# Patient Record
Sex: FEMALE | Race: WHITE | NOT HISPANIC OR LATINO
[De-identification: names, ages, dates, MRNs, and addresses within clinical notes are randomized per-mention and may not be internally consistent; named-entity substitution may affect disease eponyms.]

---

## 2020-04-02 ENCOUNTER — APPOINTMENT (OUTPATIENT)
Dept: GASTROENTEROLOGY | Facility: CLINIC | Age: 60
End: 2020-04-02

## 2020-08-14 ENCOUNTER — EMERGENCY (EMERGENCY)
Facility: HOSPITAL | Age: 60
LOS: 1 days | Discharge: HOME | End: 2020-08-14
Payer: COMMERCIAL

## 2020-08-14 VITALS
DIASTOLIC BLOOD PRESSURE: 80 MMHG | OXYGEN SATURATION: 98 % | TEMPERATURE: 98 F | HEART RATE: 66 BPM | SYSTOLIC BLOOD PRESSURE: 127 MMHG | RESPIRATION RATE: 18 BRPM

## 2020-08-14 DIAGNOSIS — B34.9 VIRAL INFECTION, UNSPECIFIED: ICD-10-CM

## 2020-08-14 DIAGNOSIS — M79.10 MYALGIA, UNSPECIFIED SITE: ICD-10-CM

## 2020-08-14 DIAGNOSIS — Z20.828 CONTACT WITH AND (SUSPECTED) EXPOSURE TO OTHER VIRAL COMMUNICABLE DISEASES: ICD-10-CM

## 2020-08-14 PROCEDURE — 99283 EMERGENCY DEPT VISIT LOW MDM: CPT

## 2020-08-14 PROCEDURE — U0003: CPT

## 2020-08-14 NOTE — ED STATDOCS - PATIENT PORTAL LINK FT
You can access the FollowMyHealth Patient Portal offered by [facility] by registering at the following website: [email or web address]. By joining [employer]’s FollowMyHealth portal, you will also be able to view your health information using other applications (apps) compatible with our system.

## 2020-08-14 NOTE — ED STATDOCS - OBJECTIVE STATEMENT
Pt with no PMH presents to ED with body aches x 2 days. Pt recently exposed to COVID-19. Pt here for testing.

## 2020-08-15 LAB — SARS-COV-2 RNA SPEC QL NAA+PROBE: SIGNIFICANT CHANGE UP

## 2021-04-13 PROBLEM — Z00.00 ENCOUNTER FOR PREVENTIVE HEALTH EXAMINATION: Status: ACTIVE | Noted: 2021-04-13

## 2021-04-22 ENCOUNTER — APPOINTMENT (OUTPATIENT)
Dept: MRI IMAGING | Facility: CLINIC | Age: 61
End: 2021-04-22
Payer: COMMERCIAL

## 2021-04-22 ENCOUNTER — OUTPATIENT (OUTPATIENT)
Dept: OUTPATIENT SERVICES | Facility: HOSPITAL | Age: 61
LOS: 1 days | End: 2021-04-22
Payer: COMMERCIAL

## 2021-04-22 DIAGNOSIS — D48.1 NEOPLASM OF UNCERTAIN BEHAVIOR OF CONNECTIVE AND OTHER SOFT TISSUE: ICD-10-CM

## 2021-04-22 PROCEDURE — 73718 MRI LOWER EXTREMITY W/O DYE: CPT

## 2021-04-22 PROCEDURE — 73718 MRI LOWER EXTREMITY W/O DYE: CPT | Mod: 26,LT

## 2021-06-10 ENCOUNTER — OUTPATIENT (OUTPATIENT)
Dept: OUTPATIENT SERVICES | Facility: HOSPITAL | Age: 61
LOS: 1 days | End: 2021-06-10
Payer: COMMERCIAL

## 2021-06-10 DIAGNOSIS — Z01.818 ENCOUNTER FOR OTHER PREPROCEDURAL EXAMINATION: ICD-10-CM

## 2021-06-10 DIAGNOSIS — D48.1 NEOPLASM OF UNCERTAIN BEHAVIOR OF CONNECTIVE AND OTHER SOFT TISSUE: ICD-10-CM

## 2021-06-10 DIAGNOSIS — Z96.641 PRESENCE OF RIGHT ARTIFICIAL HIP JOINT: Chronic | ICD-10-CM

## 2021-06-10 LAB
A1C WITH ESTIMATED AVERAGE GLUCOSE RESULT: 5.4 % — SIGNIFICANT CHANGE UP (ref 4–5.6)
ANION GAP SERPL CALC-SCNC: 4 MMOL/L — LOW (ref 5–17)
BASOPHILS # BLD AUTO: 0.05 K/UL — SIGNIFICANT CHANGE UP (ref 0–0.2)
BASOPHILS NFR BLD AUTO: 0.9 % — SIGNIFICANT CHANGE UP (ref 0–2)
BUN SERPL-MCNC: 11 MG/DL — SIGNIFICANT CHANGE UP (ref 7–23)
CALCIUM SERPL-MCNC: 8.9 MG/DL — SIGNIFICANT CHANGE UP (ref 8.5–10.1)
CHLORIDE SERPL-SCNC: 109 MMOL/L — HIGH (ref 96–108)
CO2 SERPL-SCNC: 28 MMOL/L — SIGNIFICANT CHANGE UP (ref 22–31)
CREAT SERPL-MCNC: 0.69 MG/DL — SIGNIFICANT CHANGE UP (ref 0.5–1.3)
EOSINOPHIL # BLD AUTO: 0.15 K/UL — SIGNIFICANT CHANGE UP (ref 0–0.5)
EOSINOPHIL NFR BLD AUTO: 2.8 % — SIGNIFICANT CHANGE UP (ref 0–6)
ESTIMATED AVERAGE GLUCOSE: 108 MG/DL — SIGNIFICANT CHANGE UP (ref 68–114)
GLUCOSE SERPL-MCNC: 83 MG/DL — SIGNIFICANT CHANGE UP (ref 70–99)
HCT VFR BLD CALC: 40.4 % — SIGNIFICANT CHANGE UP (ref 34.5–45)
HGB BLD-MCNC: 13.3 G/DL — SIGNIFICANT CHANGE UP (ref 11.5–15.5)
IMM GRANULOCYTES NFR BLD AUTO: 0.2 % — SIGNIFICANT CHANGE UP (ref 0–1.5)
LYMPHOCYTES # BLD AUTO: 1.82 K/UL — SIGNIFICANT CHANGE UP (ref 1–3.3)
LYMPHOCYTES # BLD AUTO: 33.8 % — SIGNIFICANT CHANGE UP (ref 13–44)
MCHC RBC-ENTMCNC: 28 PG — SIGNIFICANT CHANGE UP (ref 27–34)
MCHC RBC-ENTMCNC: 32.9 GM/DL — SIGNIFICANT CHANGE UP (ref 32–36)
MCV RBC AUTO: 85.1 FL — SIGNIFICANT CHANGE UP (ref 80–100)
MONOCYTES # BLD AUTO: 0.44 K/UL — SIGNIFICANT CHANGE UP (ref 0–0.9)
MONOCYTES NFR BLD AUTO: 8.2 % — SIGNIFICANT CHANGE UP (ref 2–14)
NEUTROPHILS # BLD AUTO: 2.91 K/UL — SIGNIFICANT CHANGE UP (ref 1.8–7.4)
NEUTROPHILS NFR BLD AUTO: 54.1 % — SIGNIFICANT CHANGE UP (ref 43–77)
PLATELET # BLD AUTO: 234 K/UL — SIGNIFICANT CHANGE UP (ref 150–400)
POTASSIUM SERPL-MCNC: 3.8 MMOL/L — SIGNIFICANT CHANGE UP (ref 3.5–5.3)
POTASSIUM SERPL-SCNC: 3.8 MMOL/L — SIGNIFICANT CHANGE UP (ref 3.5–5.3)
RBC # BLD: 4.75 M/UL — SIGNIFICANT CHANGE UP (ref 3.8–5.2)
RBC # FLD: 12.5 % — SIGNIFICANT CHANGE UP (ref 10.3–14.5)
SODIUM SERPL-SCNC: 141 MMOL/L — SIGNIFICANT CHANGE UP (ref 135–145)
WBC # BLD: 5.38 K/UL — SIGNIFICANT CHANGE UP (ref 3.8–10.5)
WBC # FLD AUTO: 5.38 K/UL — SIGNIFICANT CHANGE UP (ref 3.8–10.5)

## 2021-06-10 PROCEDURE — 83036 HEMOGLOBIN GLYCOSYLATED A1C: CPT

## 2021-06-10 PROCEDURE — 36415 COLL VENOUS BLD VENIPUNCTURE: CPT

## 2021-06-10 PROCEDURE — 80048 BASIC METABOLIC PNL TOTAL CA: CPT

## 2021-06-10 PROCEDURE — 93010 ELECTROCARDIOGRAM REPORT: CPT

## 2021-06-10 PROCEDURE — 93005 ELECTROCARDIOGRAM TRACING: CPT

## 2021-06-10 PROCEDURE — 85025 COMPLETE CBC W/AUTO DIFF WBC: CPT

## 2021-06-10 NOTE — ASU PATIENT PROFILE, ADULT - PMH
DM2 (diabetes mellitus, type 2)    Hypothyroidism    Osteoarthritis  s/p R THR 2011  White coat syndrome without diagnosis of hypertension

## 2021-06-10 NOTE — ASU PATIENT PROFILE, ADULT - PATIENT'S HEIGHT AND WEIGHT RECORDED IN THE VITAL SIGNS FLOWSHEET
Placed on diuresis and documented for negative fluid balance.  Troponin leak noted probably from demand ischemia.  Cardiology consulted.  Agreed for plan and for Dobutamine drip.  Breathing status improved.  Remained w/o chest pain and dyspnea resolved.  Remained hemodynamically stable.  Noted for supratherapeutic INR.  Coumadin held and CBC trended and remained at baseline w/o evidence of bleeding.  Cardiology reports she is returning to her baseline.  Deemed stable for discharge with outpatient follow up with home health aid.  Plan for her to follow with her cardiologist Dr Nieves. Patient speaking in full sentences, lucid and appropriate.  
yes

## 2021-06-10 NOTE — CHART NOTE - NSCHARTNOTEFT_GEN_A_CORE
61 year old female with PMH of Osteoarthritis( S/p right THR done in 2011),white coat syndrome with out diagnosis of HTN, hypothyroidism & DM2 reports having h/o of  probably a lipoma in left leg near lateral aspect of left knee for many years, noticed its getting enlarged. S/p surgical consult, scheduled for Excision of sofe tissue lesion on left lower extremity on 06/17/2021.   Vital signs : Tepp 36.4, /84, Pulse 68, RR 16, Saturation 98% on room air.  S/P Covid vaccine x 2 doses of  moderna.  Scheduled for JOHN PAUL-Covid 2 swab testing on 06/14/2021, Advised to quarantine after covid test.  Patient  denies any symptoms of covid infection , [place] travelled  [place] in [place] last 21 days , not visited any sick person/ anyone tested positive for covid test.   Denies fever, cough, shortness of breadth, diarrhea, throat pain, changes in taste/smell or any rash.     Plan:  1. PST instructions given ; NPO status instructions to be given by ASU , hydrate well & drink an extra quart of water day before surgery.  2. Pt instructed to take following meds with sip of water : Levothyroxine,   Advised last dose Metformin on 06/16 AM  3. Pt instructed to take routine evening medications unless indicated   4. Stop NSAIDS ( Aspirin Aleve Motrin Mobic Diclofenac), herbal supplements , MVI , Vitamin fish oil 7 days prior to surgery  unless  directed by surgeon or cardiologist;    5. EZ wash instructions given .  6. Labs, EKG as per surgeon request   7. Pt instructed to self quarantine after Covid test   8. Covid Testing in Prep, Pt notified and aware  10. Pt denies covid symptoms shortness of breath fever cough

## 2021-06-11 DIAGNOSIS — Z01.818 ENCOUNTER FOR OTHER PREPROCEDURAL EXAMINATION: ICD-10-CM

## 2021-06-11 DIAGNOSIS — D48.1 NEOPLASM OF UNCERTAIN BEHAVIOR OF CONNECTIVE AND OTHER SOFT TISSUE: ICD-10-CM

## 2021-06-13 DIAGNOSIS — Z01.818 ENCOUNTER FOR OTHER PREPROCEDURAL EXAMINATION: ICD-10-CM

## 2021-06-14 ENCOUNTER — APPOINTMENT (OUTPATIENT)
Dept: DISASTER EMERGENCY | Facility: CLINIC | Age: 61
End: 2021-06-14

## 2021-06-14 LAB — SARS-COV-2 N GENE NPH QL NAA+PROBE: NOT DETECTED

## 2021-06-17 ENCOUNTER — RESULT REVIEW (OUTPATIENT)
Age: 61
End: 2021-06-17

## 2021-06-17 ENCOUNTER — OUTPATIENT (OUTPATIENT)
Dept: INPATIENT UNIT | Facility: HOSPITAL | Age: 61
LOS: 1 days | Discharge: HOME | End: 2021-06-17
Payer: COMMERCIAL

## 2021-06-17 VITALS
DIASTOLIC BLOOD PRESSURE: 81 MMHG | HEART RATE: 76 BPM | WEIGHT: 134.92 LBS | RESPIRATION RATE: 16 BRPM | OXYGEN SATURATION: 99 % | TEMPERATURE: 97 F | HEIGHT: 55 IN | SYSTOLIC BLOOD PRESSURE: 128 MMHG

## 2021-06-17 VITALS
RESPIRATION RATE: 18 BRPM | SYSTOLIC BLOOD PRESSURE: 125 MMHG | HEART RATE: 60 BPM | OXYGEN SATURATION: 100 % | DIASTOLIC BLOOD PRESSURE: 83 MMHG | TEMPERATURE: 97 F

## 2021-06-17 DIAGNOSIS — D48.1 NEOPLASM OF UNCERTAIN BEHAVIOR OF CONNECTIVE AND OTHER SOFT TISSUE: ICD-10-CM

## 2021-06-17 DIAGNOSIS — Z96.641 PRESENCE OF RIGHT ARTIFICIAL HIP JOINT: Chronic | ICD-10-CM

## 2021-06-17 PROCEDURE — 88305 TISSUE EXAM BY PATHOLOGIST: CPT | Mod: 26

## 2021-06-17 PROCEDURE — 88305 TISSUE EXAM BY PATHOLOGIST: CPT

## 2021-06-17 NOTE — ASU DISCHARGE PLAN (ADULT/PEDIATRIC) - CARE PROVIDER_API CALL
Mando Stein)  Surgery; Surgical Critical Care  [address] [place] [address]  Phone: [phone]  Fax: [phone]  Follow Up Time: 1 week

## 2021-06-17 NOTE — ASU PREOP CHECKLIST - BLOOD AVAILABLE
Vaccine Information Statement(s) for was given today. This has been reviewed, questions answered, and verbal consent given by Patient for injection(s) and administration of Pneumococcal Conjugate (PCV13).     n/a

## 2021-06-17 NOTE — ASU PATIENT PROFILE, ADULT - PMH
DM2 (diabetes mellitus, type 2)    Hypothyroidism    Osteoarthritis  s/p R THR 2011  White coat syndrome without diagnosis of hypertension     unk

## 2021-06-17 NOTE — BRIEF OPERATIVE NOTE - NSICDXBRIEFPROCEDURE_GEN_ALL_CORE_FT
PROCEDURES:  Excision, soft tissue tumor, leg area, subfascial, less than 5 cm 17-Jun-2021 12:05:18  Rhea Weaver

## 2021-06-17 NOTE — ASU DISCHARGE PLAN (ADULT/PEDIATRIC) - ASU DC SPECIAL INSTRUCTIONSFT
PAIN: You may continue to take Acetaminophen (Tylenol) and Ibuprofen (Advil, Motrin) over the counter for pain. If the over counter medication is not strong enough then stronger medication will sent but please only take for uncontrolled pain  WOUND CARE:  You do not have any stitches that need to be removed. There are dressings in place. The top dressing can be removed in 2 days. Under the top dressing are strips of white tape that are to stay in place. These strips of white tape will either fall off on their own or can will be taken off in the office. You will be allowed to take a full shower in 2 days. You should allow warm soapy water to run down the wound in the shower. You do not need to scrub the area.   BATHING: Please do not soak or submerge the wound in water (bath, swimming) for 14 days after your surgery.  ACTIVITY: No heavy lifting, straining, or vigorous activity until your follow-up appointment in 1-2 weeks.   NOTIFY US IF: You have any bleeding that does not stop, any pus draining from your wound(s), any fever (over 100.4 F) or chills, persistent nausea/vomiting, persistent diarrhea, or if your pain is not controlled on your discharge pain medications.  FOLLOW-UP: Please call the office and make an appointment to follow up with Dr Stein in 1 weeks.

## 2021-06-23 DIAGNOSIS — Z87.891 PERSONAL HISTORY OF NICOTINE DEPENDENCE: ICD-10-CM

## 2021-06-23 DIAGNOSIS — E11.9 TYPE 2 DIABETES MELLITUS WITHOUT COMPLICATIONS: ICD-10-CM

## 2021-06-23 DIAGNOSIS — G89.29 OTHER CHRONIC PAIN: ICD-10-CM

## 2021-06-23 DIAGNOSIS — E03.9 HYPOTHYROIDISM, UNSPECIFIED: ICD-10-CM

## 2021-06-23 DIAGNOSIS — M79.9 SOFT TISSUE DISORDER, UNSPECIFIED: ICD-10-CM

## 2021-06-23 DIAGNOSIS — Z79.899 OTHER LONG TERM (CURRENT) DRUG THERAPY: ICD-10-CM

## 2021-06-23 DIAGNOSIS — I10 ESSENTIAL (PRIMARY) HYPERTENSION: ICD-10-CM

## 2021-06-23 DIAGNOSIS — Z79.84 LONG TERM (CURRENT) USE OF ORAL HYPOGLYCEMIC DRUGS: ICD-10-CM

## 2021-06-23 DIAGNOSIS — D17.24 BENIGN LIPOMATOUS NEOPLASM OF SKIN AND SUBCUTANEOUS TISSUE OF LEFT LEG: ICD-10-CM

## 2022-08-29 ENCOUNTER — EMERGENCY (EMERGENCY)
Facility: HOSPITAL | Age: 62
LOS: 1 days | Discharge: HOME | End: 2022-08-29
Attending: EMERGENCY MEDICINE
Payer: COMMERCIAL

## 2022-08-29 VITALS
RESPIRATION RATE: 16 BRPM | TEMPERATURE: 99 F | DIASTOLIC BLOOD PRESSURE: 83 MMHG | OXYGEN SATURATION: 97 % | HEIGHT: 66.5 IN | HEART RATE: 79 BPM | SYSTOLIC BLOOD PRESSURE: 179 MMHG

## 2022-08-29 DIAGNOSIS — Z96.641 PRESENCE OF RIGHT ARTIFICIAL HIP JOINT: Chronic | ICD-10-CM

## 2022-08-29 LAB
ALBUMIN SERPL ELPH-MCNC: 4.1 G/DL — SIGNIFICANT CHANGE UP (ref 3.3–5.2)
ALP SERPL-CCNC: 58 U/L — SIGNIFICANT CHANGE UP (ref 40–120)
ALT FLD-CCNC: 13 U/L — SIGNIFICANT CHANGE UP
ANION GAP SERPL CALC-SCNC: 13 MMOL/L — SIGNIFICANT CHANGE UP (ref 5–17)
APTT BLD: 30.8 SEC — SIGNIFICANT CHANGE UP (ref 27.5–35.5)
AST SERPL-CCNC: 21 U/L — SIGNIFICANT CHANGE UP
BASOPHILS # BLD AUTO: 0.05 K/UL — SIGNIFICANT CHANGE UP (ref 0–0.2)
BASOPHILS NFR BLD AUTO: 0.9 % — SIGNIFICANT CHANGE UP (ref 0–2)
BILIRUB SERPL-MCNC: 0.2 MG/DL — LOW (ref 0.4–2)
BLD GP AB SCN SERPL QL: SIGNIFICANT CHANGE UP
BUN SERPL-MCNC: 15.6 MG/DL — SIGNIFICANT CHANGE UP (ref 8–20)
CALCIUM SERPL-MCNC: 9 MG/DL — SIGNIFICANT CHANGE UP (ref 8.4–10.5)
CHLORIDE SERPL-SCNC: 88 MMOL/L — LOW (ref 98–107)
CO2 SERPL-SCNC: 25 MMOL/L — SIGNIFICANT CHANGE UP (ref 22–29)
CREAT SERPL-MCNC: 0.63 MG/DL — SIGNIFICANT CHANGE UP (ref 0.5–1.3)
EGFR: 100 ML/MIN/1.73M2 — SIGNIFICANT CHANGE UP
EOSINOPHIL # BLD AUTO: 0.08 K/UL — SIGNIFICANT CHANGE UP (ref 0–0.5)
EOSINOPHIL NFR BLD AUTO: 1.5 % — SIGNIFICANT CHANGE UP (ref 0–6)
GLUCOSE SERPL-MCNC: 93 MG/DL — SIGNIFICANT CHANGE UP (ref 70–99)
HCT VFR BLD CALC: 39.7 % — SIGNIFICANT CHANGE UP (ref 34.5–45)
HGB BLD-MCNC: 14 G/DL — SIGNIFICANT CHANGE UP (ref 11.5–15.5)
IMM GRANULOCYTES NFR BLD AUTO: 0.2 % — SIGNIFICANT CHANGE UP (ref 0–1.5)
INR BLD: 0.98 RATIO — SIGNIFICANT CHANGE UP (ref 0.88–1.16)
LYMPHOCYTES # BLD AUTO: 1.78 K/UL — SIGNIFICANT CHANGE UP (ref 1–3.3)
LYMPHOCYTES # BLD AUTO: 33.3 % — SIGNIFICANT CHANGE UP (ref 13–44)
MCHC RBC-ENTMCNC: 28.4 PG — SIGNIFICANT CHANGE UP (ref 27–34)
MCHC RBC-ENTMCNC: 35.3 GM/DL — SIGNIFICANT CHANGE UP (ref 32–36)
MCV RBC AUTO: 80.5 FL — SIGNIFICANT CHANGE UP (ref 80–100)
MONOCYTES # BLD AUTO: 0.41 K/UL — SIGNIFICANT CHANGE UP (ref 0–0.9)
MONOCYTES NFR BLD AUTO: 7.7 % — SIGNIFICANT CHANGE UP (ref 2–14)
NEUTROPHILS # BLD AUTO: 3.01 K/UL — SIGNIFICANT CHANGE UP (ref 1.8–7.4)
NEUTROPHILS NFR BLD AUTO: 56.4 % — SIGNIFICANT CHANGE UP (ref 43–77)
PLATELET # BLD AUTO: 265 K/UL — SIGNIFICANT CHANGE UP (ref 150–400)
POTASSIUM SERPL-MCNC: 3.9 MMOL/L — SIGNIFICANT CHANGE UP (ref 3.5–5.3)
POTASSIUM SERPL-SCNC: 3.9 MMOL/L — SIGNIFICANT CHANGE UP (ref 3.5–5.3)
PROT SERPL-MCNC: 6.4 G/DL — LOW (ref 6.6–8.7)
PROTHROM AB SERPL-ACNC: 11.4 SEC — SIGNIFICANT CHANGE UP (ref 10.5–13.4)
RBC # BLD: 4.93 M/UL — SIGNIFICANT CHANGE UP (ref 3.8–5.2)
RBC # FLD: 12 % — SIGNIFICANT CHANGE UP (ref 10.3–14.5)
SARS-COV-2 RNA SPEC QL NAA+PROBE: SIGNIFICANT CHANGE UP
SODIUM SERPL-SCNC: 126 MMOL/L — LOW (ref 135–145)
TROPONIN T SERPL-MCNC: <0.01 NG/ML — SIGNIFICANT CHANGE UP (ref 0–0.06)
WBC # BLD: 5.34 K/UL — SIGNIFICANT CHANGE UP (ref 3.8–10.5)
WBC # FLD AUTO: 5.34 K/UL — SIGNIFICANT CHANGE UP (ref 3.8–10.5)

## 2022-08-29 PROCEDURE — 99220: CPT

## 2022-08-29 PROCEDURE — 99204 OFFICE O/P NEW MOD 45 MIN: CPT

## 2022-08-29 PROCEDURE — 70551 MRI BRAIN STEM W/O DYE: CPT | Mod: 26,MA

## 2022-08-29 PROCEDURE — 93010 ELECTROCARDIOGRAM REPORT: CPT

## 2022-08-29 PROCEDURE — 70498 CT ANGIOGRAPHY NECK: CPT | Mod: 26,MA

## 2022-08-29 PROCEDURE — 70496 CT ANGIOGRAPHY HEAD: CPT | Mod: 26,MA

## 2022-08-29 PROCEDURE — 0042T: CPT | Mod: MA

## 2022-08-29 RX ORDER — LEVOTHYROXINE SODIUM 125 MCG
1 TABLET ORAL
Qty: 0 | Refills: 0 | DISCHARGE

## 2022-08-29 RX ORDER — LEVOTHYROXINE SODIUM 125 MCG
112 TABLET ORAL DAILY
Refills: 0 | Status: DISCONTINUED | OUTPATIENT
Start: 2022-08-29 | End: 2022-08-30

## 2022-08-29 RX ORDER — METFORMIN HYDROCHLORIDE 850 MG/1
500 TABLET ORAL DAILY
Refills: 0 | Status: DISCONTINUED | OUTPATIENT
Start: 2022-08-29 | End: 2022-09-03

## 2022-08-29 RX ORDER — METFORMIN HYDROCHLORIDE 850 MG/1
1 TABLET ORAL
Qty: 0 | Refills: 0 | DISCHARGE

## 2022-08-29 RX ORDER — ASPIRIN/CALCIUM CARB/MAGNESIUM 324 MG
81 TABLET ORAL DAILY
Refills: 0 | Status: DISCONTINUED | OUTPATIENT
Start: 2022-08-29 | End: 2022-09-03

## 2022-08-29 RX ORDER — SODIUM CHLORIDE 9 MG/ML
1000 INJECTION INTRAMUSCULAR; INTRAVENOUS; SUBCUTANEOUS ONCE
Refills: 0 | Status: COMPLETED | OUTPATIENT
Start: 2022-08-29 | End: 2022-08-29

## 2022-08-29 RX ORDER — ACETAMINOPHEN 500 MG
650 TABLET ORAL EVERY 6 HOURS
Refills: 0 | Status: DISCONTINUED | OUTPATIENT
Start: 2022-08-29 | End: 2022-09-03

## 2022-08-29 RX ADMIN — Medication 112 MICROGRAM(S): at 19:05

## 2022-08-29 RX ADMIN — Medication 1 MILLIGRAM(S): at 20:04

## 2022-08-29 RX ADMIN — SODIUM CHLORIDE 1000 MILLILITER(S): 9 INJECTION INTRAMUSCULAR; INTRAVENOUS; SUBCUTANEOUS at 18:33

## 2022-08-29 RX ADMIN — Medication 650 MILLIGRAM(S): at 18:56

## 2022-08-29 NOTE — ED PROVIDER NOTE - PHYSICAL EXAMINATION
Const: Awake, alert and oriented. In no acute distress. Well appearing.  HEENT: NC/AT. Moist mucous membranes.  Eyes: No scleral icterus. EOMI.  Neck:. Soft and supple. Full ROM without pain.  Cardiac: Regular rate and regular rhythm. +S1/S2. Peripheral pulses 2+ and symmetric. No LE edema.  Resp: Speaking in full sentences. No evidence of respiratory distress. No wheezes, rales or rhonchi.  Abd: Soft, non-tender, non-distended. Normal bowel sounds in all 4 quadrants. No guarding or rebound.  Back: Spine midline and non-tender. No CVAT.  Skin: No rashes, abrasions or lacerations.  Lymph: No cervical lymphadenopathy.  Neuro: A&Ox3, moving all extremities symmetrically, follows commands, motor ian upper and lower ext 5/5, sensory symm and intact CN 2-12 grossly intact, no ataxia, no nystagmus, no dysmetria, no ddk, symm ian, no pronator drift

## 2022-08-29 NOTE — ED PROVIDER NOTE - OBJECTIVE STATEMENT
61yo female with pmh of hypothyroidism and DM on metformin presents with HA and vision changes. Pt states that she was at work and then started to see floaters in her eyes and then lost vision temporarily which self resolved. Pt also reports HA during this occuring. PT on arrival here with vision at baseline but tremulous states she can't control the shaking and has generalized weakness. Pt denies fevers/chills, loc, cp/sob/palp, cough, abd pain/n/v/d, urinary symptoms, recent travel and sick contacts.

## 2022-08-29 NOTE — ED ADULT TRIAGE NOTE - DOMESTIC TRAVEL HIGH RISK QUESTION
Narcotic pain medicine is constipating , Buy over the counter stool softener and take as instructed on the bottle. No tylenol 650mg every 3 hours as needed

## 2022-08-29 NOTE — ED CDU PROVIDER INITIAL DAY NOTE - NSICDXFAMILYHX_GEN_ALL_CORE_FT
FAMILY HISTORY:  Father  Still living? Unknown  FH: pancreatic cancer, Age at diagnosis: Age Unknown    Mother  Still living? Unknown  FH: COPD (chronic obstructive pulmonary disease), Age at diagnosis: Age Unknown

## 2022-08-29 NOTE — ED CDU PROVIDER INITIAL DAY NOTE - NSICDXPASTSURGICALHX_GEN_ALL_CORE_FT
PAST SURGICAL HISTORY:  History of total hip replacement, right 2011, [facility]

## 2022-08-29 NOTE — ED CDU PROVIDER INITIAL DAY NOTE - NS ED ROS FT
Gen: denies fever, chills, fatigue, weight loss  Skin: denies rashes, laceration, bruising  HEENT: +Visual floaters (resolved). denies eye pain, ear pain, nasal congestion, throat pain  Respiratory: denies BRADLEY, SOB, cough, wheezing  Cardiovascular: denies chest pain, palpitations, diaphoresis, LE edema  GI: denies abdominal pain, n/v/d  : denies dysuria, frequency, urgency, bowel/bladder incontinence  MSK: denies joint swelling/pain, back pain, neck pain  Neuro: +HA. denies dizziness, weakness, numbness  Psych: denies anxiety, depression, SI/HI, visual/auditory hallucinations

## 2022-08-29 NOTE — ED CDU PROVIDER INITIAL DAY NOTE - NSICDXPASTMEDICALHX_GEN_ALL_CORE_FT
PAST MEDICAL HISTORY:  DM2 (diabetes mellitus, type 2)     Hypothyroidism     Osteoarthritis s/p R THR 2011    White coat syndrome without diagnosis of hypertension

## 2022-08-29 NOTE — ED PROVIDER NOTE - PROGRESS NOTE DETAILS
Henry: Pt's condition necessitates IV contrast prior to result of labs. Benefits outweigh the risks.

## 2022-08-29 NOTE — ED ADULT TRIAGE NOTE - CHIEF COMPLAINT QUOTE
sudden onset headache 2 hours ago with "floaters in both my eyes." pt reports she then lost her vision briefly before return. a and o x3. garcia. perrl. no facial droop noted. speech clear. code stroke called.

## 2022-08-29 NOTE — ED PROVIDER NOTE - CLINICAL SUMMARY MEDICAL DECISION MAKING FREE TEXT BOX
61yo female with pmh of hypothyroidism and DM on metformin presents with HA and vision changes. COde stroke called arrival, pt NIHSS 0 here not a TPA candidate, Dr. Gutierrez agrees, CTA with mild stenosis for right and left ICA, no other acute findings, pt with mild hypoNa, IVF given, pt at baseline in obs for MRI

## 2022-08-29 NOTE — ED CDU PROVIDER INITIAL DAY NOTE - PHYSICAL EXAMINATION
Gen: No acute distress, anxious appearing  HENT: NCAT, Mucous membranes moist, Oropharynx without exudates, uvula midline  Eyes: pink conjunctivae, EOMI, PERRL  CV: RRR, nl s1/s2.  Resp: CTAB, normal rate and effort  GI: Abdomen soft, NT, ND. No rebound, no guarding  : No CVAT  Neuro: A&O x 3, CN II-XII intact, sensorimotor intact without deficits, no pronator drift, finger to nose without dysmetria  MSK: No spine or joint tenderness to palpation, Full ROM ext x 4  Skin: No rashes. intact and perfused.

## 2022-08-29 NOTE — CONSULT NOTE ADULT - ASSESSMENT
IMPRESSION:  - Generalized Myoclonic jerks likely form a metabolic/ toxic etiology  - Hyponatremia.  - Much less likely to be an acute infarct.    ASSESSMENT/ PLAN:     - Admit to inpatient hospitalist service.  - Neuro checks and vital signs Q 2 hours.  - SBP goal permissive up to 160 for 24 hours and then normotensive.  - ASA 81 mg PO or 300 NE QD pending MRI Brain.    - Lipitor for LDL goal of < 70 .  - Telemetry monitoring.  - CT/CTA/CTP -images and reports were reviewed.   - MRI Brain stroke protocol.  - Check fasting Lipid panel and HbA1c  - TTE with bubble study.  - Work up and management of hyponatremia per primary medical team  - PT OT SLP evaluation.  - SCD/ SQ Lovenox for DVT prophylaxis.       IMPRESSION:  - Generalized Myoclonic jerks likely form a metabolic/ toxic etiology  - Hyponatremia.  - Much less likely to be an acute infarct.    ASSESSMENT/ PLAN:   - IV tPA was not recommended as NIHSS was 0 and clinical syndrome unlikely to be from an acute infarct.  - Admit to inpatient hospitalist service.  - Neuro checks and vital signs Q 2 hours.  - SBP goal permissive up to 160 for 24 hours and then normotensive.  - ASA 81 mg PO or 300 NV QD pending MRI Brain.    - Lipitor for LDL goal of < 70 .  - Telemetry monitoring.  - CT/CTA/CTP -images and reports were reviewed.   - MRI Brain stroke protocol.  - Check fasting Lipid panel and HbA1c  - TTE with bubble study.  - Work up and management of hyponatremia per primary medical team  - PT OT SLP evaluation.  - SCD/ SQ Lovenox for DVT prophylaxis.       IMPRESSION:  - Generalized Myoclonic jerks likely form a metabolic/ toxic etiology  - Hyponatremia.  - Much less likely to be an acute infarct.    ASSESSMENT/ PLAN:   - IV tPA was not recommended as NIHSS was 0 and clinical syndrome unlikely to be from an acute infarct.  - Admit to  ED observation unit or inpatient hospitalist service.  - Neuro checks and vital signs Q 4 hours.  - SBP goal permissive up to 160 for 24 hours and then normotensive.  - ASA 81 mg PO or 300 LA QD pending MRI Brain.    - Lipitor for LDL goal of < 70 .  - Telemetry monitoring.  - CT/CTA/CTP -images and reports were reviewed.   - MRI Brain stroke protocol.  - Check fasting Lipid panel and HbA1c  - TTE with bubble study.  - Work up and management of hyponatremia per primary medical team  - PT OT SLP evaluation.  - SCD/ SQ Lovenox for DVT prophylaxis.

## 2022-08-29 NOTE — CONSULT NOTE ADULT - SUBJECTIVE AND OBJECTIVE BOX
LSW - she is currently back to baseline   Neurology consult    STEFFANY JACINTO 62y Female       HPI:  63yo female with pmh of hypothyroidism and DM on metformin presents with HA and vision changes. Pt states that she was at work and then started to see floaters in her eyes and then lost vision temporarily which self resolved. Pt also reports HA during this occuring. PT on arrival here with vision at baseline but tremulous states she can't control the shaking and has generalized weakness. Pt denies fevers/chills, loc, cp/sob/palp, cough, abd pain/n/v/d, urinary symptoms, recent travel and sick contacts  PMH: Osteoarthritis    Hypothyroidism    DM2 (diabetes mellitus, type 2)    White coat syndrome without diagnosis of hypertension         PSH: History of total hip replacement, right          FAMILY HISTORY:  FH: pancreatic cancer (Father)    FH: COPD (chronic obstructive pulmonary disease) (Mother)        SOCIAL HISTORY:  No history of tobacco or alcohol use     Allergies    No Known Allergies    Intolerances        Height (cm): 168.9 (08-29 @ 16:07)    Vital Signs Last 24 Hrs  T(C): 37.1 (29 Aug 2022 16:07), Max: 37.1 (29 Aug 2022 16:07)  T(F): 98.7 (29 Aug 2022 16:07), Max: 98.7 (29 Aug 2022 16:07)  HR: 64 (29 Aug 2022 18:00) (62 - 79)  BP: 138/80 (29 Aug 2022 18:00) (138/80 - 179/83)  BP(mean): --  RR: 18 (29 Aug 2022 18:00) (16 - 18)  SpO2: 99% (29 Aug 2022 18:00) (97% - 100%)    Parameters below as of 29 Aug 2022 18:00  Patient On (Oxygen Delivery Method): room air      MEDICATIONS    acetaminophen     Tablet .. 650 milliGRAM(s) Oral every 6 hours PRN  aspirin  chewable 81 milliGRAM(s) Oral daily  levothyroxine 112 MICROGram(s) Oral daily  LORazepam   Injectable 1 milliGRAM(s) IV Push once  metFORMIN 500 milliGRAM(s) Oral daily        LABS:  CBC Full  -  ( 29 Aug 2022 16:25 )  WBC Count : 5.34 K/uL  RBC Count : 4.93 M/uL  Hemoglobin : 14.0 g/dL  Hematocrit : 39.7 %  Platelet Count - Automated : 265 K/uL  Mean Cell Volume : 80.5 fl  Mean Cell Hemoglobin : 28.4 pg  Mean Cell Hemoglobin Concentration : 35.3 gm/dL  Auto Neutrophil # : 3.01 K/uL  Auto Lymphocyte # : 1.78 K/uL  Auto Monocyte # : 0.41 K/uL  Auto Eosinophil # : 0.08 K/uL  Auto Basophil # : 0.05 K/uL  Auto Neutrophil % : 56.4 %  Auto Lymphocyte % : 33.3 %  Auto Monocyte % : 7.7 %  Auto Eosinophil % : 1.5 %  Auto Basophil % : 0.9 %      08-29    126<L>  |  88<L>  |  15.6  ----------------------------<  93  3.9   |  25.0  |  0.63    Ca    9.0      29 Aug 2022 16:25    TPro  6.4<L>  /  Alb  4.1  /  TBili  0.2<L>  /  DBili  x   /  AST  21  /  ALT  13  /  AlkPhos  58  08-29    LIVER FUNCTIONS - ( 29 Aug 2022 16:25 )  Alb: 4.1 g/dL / Pro: 6.4 g/dL / ALK PHOS: 58 U/L / ALT: 13 U/L / AST: 21 U/L / GGT: x           Hemoglobin A1C:       PT/INR - ( 29 Aug 2022 16:25 )   PT: 11.4 sec;   INR: 0.98 ratio         PTT - ( 29 Aug 2022 16:25 )  PTT:30.8 sec      On Neurological Examination:    Mental Status - Patient is  awake, alert and oriented X3.  Speech is fluent. Patient can name, repeat and follow commands correctly  There is no dysarthria.    Cranial Nerves - PERRL, EOMI,  Visual fields are full to finger counting, no gross facial asymmetry, tongue/uvula midline    Motor Exam -   Right upper ---5/5 No drift  Left upper ---5/5 No drift  Right lower ---5/5 No drift  Left lower  ---5/5 No drift     nml bulk/tone. She is tremulous and has myoclonic jerks involving both ypper and lower extremities.    Sensory    Intact to light touch and pinprick bilaterally    Coord: FTN intact bilaterally     Gait -  Not assessed.     [facility] SS:   08/29/22  Time: 1630  1a) Level of consciousness (0-3):   1b) Questions (0-2):   1c) Commands (0-2):   2  ) Gaze (0-2):   3  ) Visual field (0-3):   4  ) Facial palsy (0-3):   Motor  5a) Left arm (0-4):   5b) Right arm (0-4):   6a) Left leg (0-4):   6b) Right leg (0-4):   7  ) Ataxia (0-2):   Sensory  8  ) Sensory (0-2):   Speech  9  ) Language (0-3):   10) Dysarthria (0-2):   Extinction  11) Extinction/inattention (0-2):     Total score: =    Patient was last seen well at 1400  Prestroke Modified [place]:  0         RADIOLOGY ( All neurological imaging studies were independently reviewed and interpreted by me)  [facility]   CTA  CTP  CT Angio Neck Stroke Protocol w/ IV Cont (08.29.22 @ 16:44) >    IMPRESSION:    CT PERFUSION: No regional perfusion abnormality.    CTA BRAIN: Patent intracranial circulation. No flow-limiting stenosis or   occlusion.    CTA NECK: Mild left greater than right ICA origin stenosis due to   ulcerated noncalcified and calcified atheromatous plaque. Otherwise, no   flow limiting stenosis or occlusion.    Biapical centrilobular emphysema.    SUE STARKEY MD; Attending Radiologist    MRI:  TTE               Neurology consult    STEFFANY JACINTO 62y Female       HPI:  63yo female with pmh of hypothyroidism and DM on metformin presents with HA and vision changes. Pt states that she was at work and then started to see floaters in her eyes and then lost vision temporarily which self resolved. Pt also reports HA during this occuring. PT on arrival here with vision at baseline but tremulous states she can't control the shaking and has generalized weakness. Pt denies fevers/chills, loc, cp/sob/palp, cough, abd pain/n/v/d, urinary symptoms, recent travel and sick contacts  PMH: Osteoarthritis    Hypothyroidism    DM2 (diabetes mellitus, type 2)    White coat syndrome without diagnosis of hypertension         PSH: History of total hip replacement, right          FAMILY HISTORY:  FH: pancreatic cancer (Father)    FH: COPD (chronic obstructive pulmonary disease) (Mother)        SOCIAL HISTORY:  No history of tobacco or alcohol use     Allergies    No Known Allergies    Intolerances        Height (cm): 168.9 (08-29 @ 16:07)    Vital Signs Last 24 Hrs  T(C): 37.1 (29 Aug 2022 16:07), Max: 37.1 (29 Aug 2022 16:07)  T(F): 98.7 (29 Aug 2022 16:07), Max: 98.7 (29 Aug 2022 16:07)  HR: 64 (29 Aug 2022 18:00) (62 - 79)  BP: 138/80 (29 Aug 2022 18:00) (138/80 - 179/83)  BP(mean): --  RR: 18 (29 Aug 2022 18:00) (16 - 18)  SpO2: 99% (29 Aug 2022 18:00) (97% - 100%)    Parameters below as of 29 Aug 2022 18:00  Patient On (Oxygen Delivery Method): room air      MEDICATIONS    acetaminophen     Tablet .. 650 milliGRAM(s) Oral every 6 hours PRN  aspirin  chewable 81 milliGRAM(s) Oral daily  levothyroxine 112 MICROGram(s) Oral daily  LORazepam   Injectable 1 milliGRAM(s) IV Push once  metFORMIN 500 milliGRAM(s) Oral daily        LABS:  CBC Full  -  ( 29 Aug 2022 16:25 )  WBC Count : 5.34 K/uL  RBC Count : 4.93 M/uL  Hemoglobin : 14.0 g/dL  Hematocrit : 39.7 %  Platelet Count - Automated : 265 K/uL  Mean Cell Volume : 80.5 fl  Mean Cell Hemoglobin : 28.4 pg  Mean Cell Hemoglobin Concentration : 35.3 gm/dL  Auto Neutrophil # : 3.01 K/uL  Auto Lymphocyte # : 1.78 K/uL  Auto Monocyte # : 0.41 K/uL  Auto Eosinophil # : 0.08 K/uL  Auto Basophil # : 0.05 K/uL  Auto Neutrophil % : 56.4 %  Auto Lymphocyte % : 33.3 %  Auto Monocyte % : 7.7 %  Auto Eosinophil % : 1.5 %  Auto Basophil % : 0.9 %      08-29    126<L>  |  88<L>  |  15.6  ----------------------------<  93  3.9   |  25.0  |  0.63    Ca    9.0      29 Aug 2022 16:25    TPro  6.4<L>  /  Alb  4.1  /  TBili  0.2<L>  /  DBili  x   /  AST  21  /  ALT  13  /  AlkPhos  58  08-29    LIVER FUNCTIONS - ( 29 Aug 2022 16:25 )  Alb: 4.1 g/dL / Pro: 6.4 g/dL / ALK PHOS: 58 U/L / ALT: 13 U/L / AST: 21 U/L / GGT: x           Hemoglobin A1C:       PT/INR - ( 29 Aug 2022 16:25 )   PT: 11.4 sec;   INR: 0.98 ratio         PTT - ( 29 Aug 2022 16:25 )  PTT:30.8 sec      On Neurological Examination:    Mental Status - Patient is  awake, alert and oriented X3.  Speech is fluent. Patient can name, repeat and follow commands correctly  There is no dysarthria.    Cranial Nerves - PERRL, EOMI,  Visual fields are full to finger counting, no gross facial asymmetry, tongue/uvula midline    Motor Exam -   Right upper ---5/5 No drift  Left upper ---5/5 No drift  Right lower ---5/5 No drift  Left lower  ---5/5 No drift     nml bulk/tone. She is tremulous and has myoclonic jerks involving both ypper and lower extremities.    Sensory    Intact to light touch and pinprick bilaterally    Coord: FTN intact bilaterally     Gait -  Not assessed.     [facility] SS:   08/29/22  Time: 1630  1a) Level of consciousness (0-3):   1b) Questions (0-2):   1c) Commands (0-2):   2  ) Gaze (0-2):   3  ) Visual field (0-3):   4  ) Facial palsy (0-3):   Motor  5a) Left arm (0-4):   5b) Right arm (0-4):   6a) Left leg (0-4):   6b) Right leg (0-4):   7  ) Ataxia (0-2):   Sensory  8  ) Sensory (0-2):   Speech  9  ) Language (0-3):   10) Dysarthria (0-2):   Extinction  11) Extinction/inattention (0-2):     Total score: = 0    Patient was last seen well at 1400  Prestroke Modified [place]:  0         RADIOLOGY ( All neurological imaging studies were independently reviewed and interpreted by me)  [facility]   CTA  CTP  CT Angio Neck Stroke Protocol w/ IV Cont (08.29.22 @ 16:44) >    IMPRESSION:    CT PERFUSION: No regional perfusion abnormality.    CTA BRAIN: Patent intracranial circulation. No flow-limiting stenosis or   occlusion.    CTA NECK: Mild left greater than right ICA origin stenosis due to   ulcerated noncalcified and calcified atheromatous plaque. Otherwise, no   flow limiting stenosis or occlusion.    Biapical centrilobular emphysema.    SUE STARKEY MD; Attending Radiologist    MRI:  TTE

## 2022-08-29 NOTE — ED CDU PROVIDER INITIAL DAY NOTE - ATTENDING APP SHARED VISIT CONTRIBUTION OF CARE
I agree with the PA's note and was available for any issues/concerns. I was directly involved in patient care. My brief overall assessment is as follows:    62 year old female PMHx thyroid disorder, DM c/o visual changes; initial work up and neurology assessment noted; admit to obs for hydration and MRI

## 2022-08-29 NOTE — ED CDU PROVIDER INITIAL DAY NOTE - NS ED ATTENDING STATEMENT MOD
This was a shared visit with the MAR. I reviewed and verified the documentation and independently performed the documented:

## 2022-08-29 NOTE — ED ADULT NURSE NOTE - CAS ELECT INFOMATION PROVIDED
dc instructions provided and reviewed with patient. patient understanding on dc instructions. no further questions for RN./DC instructions

## 2022-08-29 NOTE — ED CDU PROVIDER INITIAL DAY NOTE - OBJECTIVE STATEMENT
63yo F pmhx hypothyroidism and DM on metformin presented to ED c/o frontal HA and visual floaters. Pt states that she was at work and then around 2pm began feeling frontal headache followed by visual floaters to b/l eyes. Symptoms lasted about 1 hour. Contrary to ER note patient states she never lost vision. Code stroke on arrival here with vision at baseline but tremulous states she couldn't control the shaking. Similar episode of visual floaters 3 weeks ago that also self resolved. Did not seek medical attention at that time. Does not typically gett headaches. CT head, CTA head/neck and perfusion negative for stroke, found to have  Mild left greater than right ICA origin stenosis on CTA. Agreeable to admission to obs for MRI. Also hyponatremic, given 1L NS in ED. Pt denies fevers/chills, loc, cp/sob/palp, cough, abd pain/n/v/d, urinary symptoms, recent travel and sick contacts.

## 2022-08-29 NOTE — ED CDU PROVIDER INITIAL DAY NOTE - MEDICAL DECISION MAKING DETAILS
61yo F presenting with visual floaters, HA x 1 hour.    #Visual changes  - CT imaging negative  - MRI  - neuro checks q4h  - ASA 81mg    #Hyponatremia  - NS bolus  - rpt BMP in AM

## 2022-08-30 VITALS
TEMPERATURE: 98 F | OXYGEN SATURATION: 100 % | HEART RATE: 60 BPM | RESPIRATION RATE: 20 BRPM | DIASTOLIC BLOOD PRESSURE: 86 MMHG | SYSTOLIC BLOOD PRESSURE: 114 MMHG

## 2022-08-30 PROBLEM — R03.0 ELEVATED BLOOD-PRESSURE READING, WITHOUT DIAGNOSIS OF HYPERTENSION: Chronic | Status: ACTIVE | Noted: 2021-06-10

## 2022-08-30 PROBLEM — E03.9 HYPOTHYROIDISM, UNSPECIFIED: Chronic | Status: ACTIVE | Noted: 2021-06-10

## 2022-08-30 PROBLEM — M19.90 UNSPECIFIED OSTEOARTHRITIS, UNSPECIFIED SITE: Chronic | Status: ACTIVE | Noted: 2021-06-10

## 2022-08-30 PROBLEM — E11.9 TYPE 2 DIABETES MELLITUS WITHOUT COMPLICATIONS: Chronic | Status: ACTIVE | Noted: 2021-06-10

## 2022-08-30 LAB
ANION GAP SERPL CALC-SCNC: 9 MMOL/L — SIGNIFICANT CHANGE UP (ref 5–17)
BUN SERPL-MCNC: 10.7 MG/DL — SIGNIFICANT CHANGE UP (ref 8–20)
CALCIUM SERPL-MCNC: 8.9 MG/DL — SIGNIFICANT CHANGE UP (ref 8.4–10.5)
CHLORIDE SERPL-SCNC: 101 MMOL/L — SIGNIFICANT CHANGE UP (ref 98–107)
CO2 SERPL-SCNC: 27 MMOL/L — SIGNIFICANT CHANGE UP (ref 22–29)
CREAT SERPL-MCNC: 0.68 MG/DL — SIGNIFICANT CHANGE UP (ref 0.5–1.3)
EGFR: 98 ML/MIN/1.73M2 — SIGNIFICANT CHANGE UP
GLUCOSE SERPL-MCNC: 89 MG/DL — SIGNIFICANT CHANGE UP (ref 70–99)
POTASSIUM SERPL-MCNC: 4.4 MMOL/L — SIGNIFICANT CHANGE UP (ref 3.5–5.3)
POTASSIUM SERPL-SCNC: 4.4 MMOL/L — SIGNIFICANT CHANGE UP (ref 3.5–5.3)
SODIUM SERPL-SCNC: 137 MMOL/L — SIGNIFICANT CHANGE UP (ref 135–145)

## 2022-08-30 PROCEDURE — 70551 MRI BRAIN STEM W/O DYE: CPT | Mod: MA

## 2022-08-30 PROCEDURE — 84484 ASSAY OF TROPONIN QUANT: CPT

## 2022-08-30 PROCEDURE — 96374 THER/PROPH/DIAG INJ IV PUSH: CPT | Mod: XU

## 2022-08-30 PROCEDURE — 86900 BLOOD TYPING SEROLOGIC ABO: CPT

## 2022-08-30 PROCEDURE — 36415 COLL VENOUS BLD VENIPUNCTURE: CPT

## 2022-08-30 PROCEDURE — 70498 CT ANGIOGRAPHY NECK: CPT | Mod: MA

## 2022-08-30 PROCEDURE — 99214 OFFICE O/P EST MOD 30 MIN: CPT

## 2022-08-30 PROCEDURE — 80053 COMPREHEN METABOLIC PANEL: CPT

## 2022-08-30 PROCEDURE — 70450 CT HEAD/BRAIN W/O DYE: CPT | Mod: MA

## 2022-08-30 PROCEDURE — 80048 BASIC METABOLIC PNL TOTAL CA: CPT

## 2022-08-30 PROCEDURE — U0005: CPT

## 2022-08-30 PROCEDURE — 85025 COMPLETE CBC W/AUTO DIFF WBC: CPT

## 2022-08-30 PROCEDURE — 85610 PROTHROMBIN TIME: CPT

## 2022-08-30 PROCEDURE — 86850 RBC ANTIBODY SCREEN: CPT

## 2022-08-30 PROCEDURE — 86901 BLOOD TYPING SEROLOGIC RH(D): CPT

## 2022-08-30 PROCEDURE — 99217: CPT

## 2022-08-30 PROCEDURE — 85730 THROMBOPLASTIN TIME PARTIAL: CPT

## 2022-08-30 PROCEDURE — 99285 EMERGENCY DEPT VISIT HI MDM: CPT | Mod: 25

## 2022-08-30 PROCEDURE — G0378: CPT

## 2022-08-30 PROCEDURE — 0042T: CPT | Mod: MA

## 2022-08-30 PROCEDURE — 70496 CT ANGIOGRAPHY HEAD: CPT | Mod: MA

## 2022-08-30 PROCEDURE — 82962 GLUCOSE BLOOD TEST: CPT

## 2022-08-30 PROCEDURE — 93005 ELECTROCARDIOGRAM TRACING: CPT

## 2022-08-30 PROCEDURE — U0003: CPT

## 2022-08-30 RX ORDER — LEVOTHYROXINE SODIUM 125 MCG
112 TABLET ORAL DAILY
Refills: 0 | Status: DISCONTINUED | OUTPATIENT
Start: 2022-08-30 | End: 2022-09-03

## 2022-08-30 RX ADMIN — Medication 650 MILLIGRAM(S): at 07:54

## 2022-08-30 NOTE — ED CDU PROVIDER SUBSEQUENT DAY NOTE - NSICDXPASTSURGICALHX_GEN_ALL_CORE_FT
PAST SURGICAL HISTORY:  History of total hip replacement, right 2011, [facility]

## 2022-08-30 NOTE — ED ADULT NURSE REASSESSMENT NOTE - NS ED NURSE REASSESS COMMENT FT1
Assumed care 1930 received report from Anup, pt a&ox4, respirations even and unlabored, states she is feeling okay no dizziness, SOB, or chest pain but still has headache. Patient pending MRI and staying for observation.
Gave report to Teena RUFF in obs, pt a&ox4, VSS, respirations even and unlabored, cardiac  monitor in place, pt awaiting MRI results, no distress noted.
Report received, care assumed.  Received patient from ED into A6L for observation status.  62 year old female presented to ED with complaint of H/A and (+) vision changes.  A&Ox4  Patient placed on cardiac monitor, SB on monitor.  NIHSS score 0  Patient in no acute distress at this time.  Awaiting MD/PA to discuss MRI results.  Offers no complaints at this time.
assumed care of patient from SPEEDY Bergman at 0730. patient aox4, no room air. no s/s of acute distress. patient connected to cardiac monitor. patient in nsr at a rate of 63 and oxygen saturation 98%. no neuro deficits noted by RN. NIH zero. patient c/o of headache, medicated as per orders. patient resting on stretcher. hourly rounding initiated. call bell in reach and encouraged to use when assistance is needed. patient able to voice concerns.

## 2022-08-30 NOTE — ED ADULT NURSE REASSESSMENT NOTE - GLASGOW COMA SCALE: BEST MOTOR RESPONSE
[facility] (Boys, 2-20 Years) weight-for-age data using vitals from 10/31/2019.  27 %ile (Z= -0.61) based on [facility] (Boys, 2-20 Years) Stature-for-age M6) obeys commands

## 2022-08-30 NOTE — ED CDU PROVIDER SUBSEQUENT DAY NOTE - ATTENDING APP SHARED VISIT CONTRIBUTION OF CARE
Seen on morning rounds.  Offers no complaints at present.  Denies floaters or headache at present.  Reports episode of floaters approximately 3 weeks ago which patient self treated with glucose tablets.  Yesterday patient reports sudden onset of floaters and double vision followed by headache, shaking and spasms.  Lasted several hours and resolved on own.   of note, patient reports that she had drinking 64 ounces of water prior to noon yesterday.   Imaging and labs reviewed.  Found to have 50% stenosis of left ICA on CT angio.  MRI largely unremarkable.  Initial serum sodium was 126, corrected to 137 today.    Plan to discharge with outpatient follow-up with ophthalmology, neurology, and vasc surgery

## 2022-08-30 NOTE — ED CDU PROVIDER DISPOSITION NOTE - NSFOLLOWUPINSTRUCTIONS_ED_ALL_ED_FT
Keep well-hydrated but avoid large volumes of free water.  Follow-up with ophthalmology, neurology and vascular surgery as recommended.  Return to the emergency department if worsening symptoms.  Otherwise follow-up with your doctor later this week for reevaluation

## 2022-08-30 NOTE — ED CDU PROVIDER DISPOSITION NOTE - CARE PROVIDERS DIRECT ADDRESSES
,DirectAddress_Unknown,[email or web address],DirectAddress_Unknown

## 2022-08-30 NOTE — ED CDU PROVIDER SUBSEQUENT DAY NOTE - HISTORY
MRI - No evidence of acute infarct, intracranial hemorrhage or vasogenic edema.   Chronic microvascular changes.

## 2022-08-30 NOTE — ED CDU PROVIDER DISPOSITION NOTE - PROVIDER TOKENS
PROVIDER:[TOKEN:[[id number]],FOLLOWUP:[7-10 Days]],PROVIDER:[TOKEN:[[id number]],FOLLOWUP:[7-10 Days]],PROVIDER:[TOKEN:[[id number]],FOLLOWUP:[7-10 Days]]

## 2022-08-30 NOTE — ED CDU PROVIDER DISPOSITION NOTE - CLINICAL COURSE
Placed on observation for extended evaluation of neurologic symptoms.   CT angio demonstrating 50% stenosis left ICA. MRI unremarkable.  Initial sodium resulted 126, corrected with normal saline bolus.  Repeat serum sodium this morning 137.  Patient advised to follow-up with neurology, ophthalmology and vascular surgery.  Patient to continue medications as previously prescribed.

## 2022-08-30 NOTE — PROGRESS NOTE ADULT - ASSESSMENT
IMPRESSION:  - Generalized Myoclonic jerks likely form a metabolic/ toxic etiology- resolved  - Hyponatremia -corrected.  - No acute infarct.    ASSESSMENT/ PLAN:     - Patient is neurologically cleared for discharge.  -- Out patient neurology follow up with Hannah Raymond [phone].

## 2022-08-30 NOTE — ED CDU PROVIDER DISPOSITION NOTE - CARE PROVIDER_API CALL
Irma Wells)  Surgery Vascular  [address] [place] [address]  Phone: [phone]  Fax: [phone]  Follow Up Time: 7-10 Days    Joselo Humphrey)  Neurology  [address] [facility], [address]  [place] [address]  Phone: [phone]  Fax: [phone]  Follow Up Time: 7-10 Days    Star Taylor; PhD)  Ophthalmology  [address] [facility]  [address] [place] [address]  Phone: [phone]  Fax: [phone]  Follow Up Time: 7-10 Days

## 2022-08-30 NOTE — ED CDU PROVIDER DISPOSITION NOTE - PATIENT PORTAL LINK FT
You can access the FollowMyHealth Patient Portal offered by [facility] by registering at the following website: [email or web address]. By joining [employer]’s FollowMyHealth portal, you will also be able to view your health information using other applications (apps) compatible with our system.

## 2022-08-30 NOTE — ED CDU PROVIDER SUBSEQUENT DAY NOTE - MEDICAL DECISION MAKING DETAILS
63yo F presenting with visual floaters, HA x 1 hour.    #Visual changes  - MRI  - neuro checks q4h  - ASA 81mg    #Hyponatremia  - NS bolus  - rpt BMP in AM

## 2022-09-01 ENCOUNTER — APPOINTMENT (OUTPATIENT)
Dept: VASCULAR SURGERY | Facility: CLINIC | Age: 62
End: 2022-09-01

## 2022-09-01 VITALS
BODY MASS INDEX: 22.22 KG/M2 | SYSTOLIC BLOOD PRESSURE: 148 MMHG | WEIGHT: 135 LBS | HEART RATE: 60 BPM | OXYGEN SATURATION: 97 % | DIASTOLIC BLOOD PRESSURE: 93 MMHG | TEMPERATURE: 97.3 F | HEIGHT: 65.5 IN | RESPIRATION RATE: 16 BRPM

## 2022-09-01 DIAGNOSIS — I65.21 OCCLUSION AND STENOSIS OF RIGHT CAROTID ARTERY: ICD-10-CM

## 2022-09-01 PROCEDURE — 93880 EXTRACRANIAL BILAT STUDY: CPT

## 2022-09-01 PROCEDURE — 99203 OFFICE O/P NEW LOW 30 MIN: CPT

## 2022-09-01 NOTE — ASSESSMENT
[FreeTextEntry1] : 63yo female incidentally seen small calcium plaque right ICA without hemodynamically significant stenosis\par -"floaters" and headache are unrelated to this finding and recommend follow up with ophthalmologist (patient states she has anappointment tomorrow)\par -no need for further surveillance or intervention\par -return to office PRN

## 2022-09-01 NOTE — HISTORY OF PRESENT ILLNESS
[FreeTextEntry1] : 63yo female w/ DM & hypothyroid who presents after an episode of headache and "floaters" in her eyes. States had a recent episode of b/l eye floates and started getting a headache went to the ED. There had work up for neurologic cause; she was incidentally found to have mild right carotid stenosis on a CTA of the neck. Patient denies any previous CVA, TIA, TMB. She follows regularly with PCP (Dr. Valdivia). No previous cardiac or PAD history. \par \par PMH: DM, hypothyroid\par PSH: hip replacement\par Meds: metformin, levothyroxine\par SocH: never smoker no EtOH\par

## 2022-09-01 NOTE — PHYSICAL EXAM
[JVD] : no jugular venous distention  [Normal Breath Sounds] : Normal breath sounds [Normal Heart Sounds] : normal heart sounds [Normal Rate and Rhythm] : normal rate and rhythm [2+] : left 2+ [Ankle Swelling (On Exam)] : not present [Varicose Veins Of Lower Extremities] : not present [] : not present [Abdomen Masses] : No abdominal masses [Abdomen Tenderness] : ~T ~M No abdominal tenderness [Abdominal Bruit] : no abdominal bruit  [Alert] : alert [Oriented to Person] : oriented to person [Oriented to Place] : oriented to place [Oriented to Time] : oriented to time [Calm] : calm [de-identified] : well appearing  [de-identified] : wnl [de-identified] : 5/5 strength in all muscle groups of upper & lower ext b/l\par intact sensation to light touch b/l

## 2022-09-01 NOTE — PROCEDURE
[FreeTextEntry1] : Carotid Artery Duplex\par Right: no significant stenosis\par Left: no significant stenosis

## 2022-10-25 ENCOUNTER — APPOINTMENT (OUTPATIENT)
Dept: NEUROLOGY | Facility: CLINIC | Age: 62
End: 2022-10-25

## 2022-12-14 ENCOUNTER — APPOINTMENT (OUTPATIENT)
Dept: ORTHOPEDIC SURGERY | Facility: CLINIC | Age: 62
End: 2022-12-14
Payer: COMMERCIAL

## 2022-12-14 VITALS — HEIGHT: 65 IN | BODY MASS INDEX: 22.49 KG/M2 | WEIGHT: 135 LBS

## 2022-12-14 DIAGNOSIS — E11.9 TYPE 2 DIABETES MELLITUS W/OUT COMPLICATIONS: ICD-10-CM

## 2022-12-14 PROCEDURE — 99204 OFFICE O/P NEW MOD 45 MIN: CPT

## 2022-12-14 PROCEDURE — 20610 DRAIN/INJ JOINT/BURSA W/O US: CPT

## 2022-12-14 NOTE — DISCUSSION/SUMMARY
[de-identified] : LUE: pain radiating into hands and fingers with abduction.\par \par 62-year-old female with left worse than right pain radiating down the shoulder into the elbow and forearm.\[facility] glenoh[facility] as well as significant cervical degenerative changes especially at C5-6.\par MRI of the cervical spine was recommended to determine and rule out cervical nerve root compression at the plan for possible procedure.\par \par Subacromial injection was given today for diagnostic and therapeutic purposes.\par Medrol Dosepak was prescribed.\par Patient will follow-up with Dr. Szymanski or Dr. Alatorre after MRI.\par \par (1) We discussed a comprehensive treatment plans that included a prescription management plan involving the\par use of prescription strength medications to include Ibuprofen 600-800 mg TID, versus 500-650 mg Tylenol. We\par also discussed prescribing topical diclofenac (Voltaren gel) as well as once daily Meloxicam 15 mg.\par (2) The patient has More Than One chronic injuries/illnesses as outlined, discussed, and documented by ICD 10\par codes listed, as well as the HPI and Plan section.\par There is a moderate risk of morbidity with further treatment, especially from use of prescription strength\par medications and possible side effects of these medications which include upset stomach and cardiac/renal issues\par with long term use were discussed.\par (3) I recommended that the patient follow-up with their medical physician to discuss any significant specific\par potential issues with long term use such as interactions with current medications or with exacerbation of\par underlying medical morbidities.

## 2022-12-14 NOTE — DATA REVIEWED
[FreeTextEntry1] : Rt Sh xray: ZWP. large inferior humeral osteophyte, no calcium, no fracture. \par Left Sh xray: ZWP: no evidence of GHOA. nO fracture. \par \par C-spine xray: moderate degeneration at C5-C6

## 2022-12-14 NOTE — PROCEDURE
[Large Joint Injection] : Large joint injection [Subacromial Space] : subacromial space [Pain] : pain [Inflammation] : inflammation [Alcohol] : alcohol [___ cc    40mg] : Triamcinolone (Kenalog) ~Vcc of 40 mg  [FreeTextEntry3] : Large Joint Injection was performed because of pain and inflammation. \par Anesthesia: ethyl chloride sprayed topically.. \par Depomedrol: An injection of Depomedrol 40 mg , 1 cc. \par Lidocaine: 7 cc. \par \par Medication was injected in the left subacromial space. Patient has tried OTC's including aspirin, Ibuprofen, Aleve etc or prescription NSAIDS, and/or exercises at home and/ or physical therapy without satisfactory response and Patient has decreased mobility in the joint. After verbal consent using sterile preparation and technique. The risks, benefits, and alternatives to cortisone injection were explained in full to the patient. Risks outlined include but are not limited to infection, sepsis, bleeding, scarring, skin discoloration, temporary increase in pain, syncopal episode, failure to resolve symptoms, allergic reaction, symptom recurrence, and elevation of blood sugar in diabetics. Patient understood the risks. All questions were answered. After discussion of options, patient requested an injection. Oral informed consent was obtained and sterile prep was done of the injection site. Sterile technique was utilized for the procedure including the preparation of the solutions used for the injection. Patient tolerated the procedure well. Advised to ice the injection site this evening. Prep with alcohol locally to site. Sterile technique used. Patient tolerated procedure well. Post Procedure Instructions: Patient was advised to call if redness, pain, or fever occur and apply ice for 15 min. out of every hour for the next 12-24 hours as tolerated. patient was advised to rest the joint(s) for 7 days. \par Ultrasound Guidance was used for the following reasons: prior failure or difficult injection. \par \par

## 2022-12-14 NOTE — PHYSICAL EXAM

## 2022-12-14 NOTE — HISTORY OF PRESENT ILLNESS
[de-identified] : 61yo female presenting to the office with b/l shoulder pain x 3 months. She reports no injury or trauma. Patient describes pain as burning and constant. She does report radiating pain down her left UE. Currently Left shoulder is greater than right. She presents today with xrays from [facility] for review. Also reports recent c-spine xrays. \par Patient is RHD, no PMHx, works as .

## 2022-12-15 ENCOUNTER — FORM ENCOUNTER (OUTPATIENT)
Age: 62
End: 2022-12-15

## 2022-12-16 ENCOUNTER — APPOINTMENT (OUTPATIENT)
Dept: MRI IMAGING | Facility: CLINIC | Age: 62
End: 2022-12-16

## 2022-12-16 PROCEDURE — 72141 MRI NECK SPINE W/O DYE: CPT

## 2022-12-28 ENCOUNTER — APPOINTMENT (OUTPATIENT)
Dept: ORTHOPEDIC SURGERY | Facility: CLINIC | Age: 62
End: 2022-12-28
Payer: COMMERCIAL

## 2022-12-28 VITALS — BODY MASS INDEX: 22.49 KG/M2 | WEIGHT: 135 LBS | HEIGHT: 65 IN

## 2022-12-28 PROCEDURE — 99214 OFFICE O/P EST MOD 30 MIN: CPT

## 2023-01-13 ENCOUNTER — APPOINTMENT (OUTPATIENT)
Dept: PAIN MANAGEMENT | Facility: CLINIC | Age: 63
End: 2023-01-13
Payer: COMMERCIAL

## 2023-01-13 VITALS — WEIGHT: 135 LBS | BODY MASS INDEX: 22.49 KG/M2 | HEIGHT: 65 IN

## 2023-01-13 PROCEDURE — 99204 OFFICE O/P NEW MOD 45 MIN: CPT

## 2023-01-13 RX ORDER — METHYLPREDNISOLONE 4 MG/1
4 TABLET ORAL
Qty: 1 | Refills: 0 | Status: DISCONTINUED | COMMUNITY
Start: 2022-12-14 | End: 2023-01-13

## 2023-01-13 RX ORDER — GABAPENTIN 300 MG/1
300 CAPSULE ORAL
Qty: 90 | Refills: 1 | Status: ACTIVE | COMMUNITY
Start: 2022-12-28

## 2023-01-13 NOTE — HISTORY OF PRESENT ILLNESS
[Neck] : neck [Left Arm] : left arm [10] : 10 [Burning] : burning [Constant] : constant [Household chores] : household chores [Nothing helps with pain getting better] : Nothing helps with pain getting better [Sitting] : sitting [Standing] : standing [Walking] : walking [Sleep] : sleep [FreeTextEntry1] : Initial HPI: \par \par 01/13/2023: This is STEFFANY JACINTO, a pleasant 62 year-old female referred to my office by Dr. Devnie for initial evaluation of neck/arm pain\par \par Location of maximal pain: left arm\par Onset: 12/2022 while reaching for box\par Radiation pattern: down left arm into hand \par Associated symptoms:  +numbness, tingling in digits 2-4, +left arm weakness; starting to have sxs on right as well\par Patient denies bowel/bladder incontinence, saddle anesthesia, fevers, chills, weight loss, or recent falls.  \par \par Current Pain Medications:\par None\par \par Past Pain Medications:\par Trialed gabapentin x1wk - unsure of benefit\par MDP - minimal relief\par \par Prior Procedures/Treatments: \par None\par \par History of back surgery: \par None\par \par Blood thinners:\par None\par \par Physical Therapy:\par Currently participating \par \par --------------------------------------------------------\par Imaging Review:\par \par 12/16/22: MRI of the Cervical Spine (OCOA)\par \par Findings: There is straightening of the cervical lordosis and multilevel disc desiccation with loss of disc height \par and degenerative endplate changes at C5-C6 and mild multilevel uncovertebral hypertrophy asymmetric \par towards the right in the mid-to-lower cervical spine without acute vertebral body fracture. The visualized \par posterior fossa structures appear unremarkable. The cervical spinal cord appears intact. There are no gross \par paravertebral soft tissue masses.\par C2-C3: There is left paracentral disc bulging, bony ridging, uncovertebral hypertrophy, and mild left foraminal \par narrowing.\par C3-C4: There is mild disc bulging, bony ridging, uncovertebral hypertrophy, and mild bilateral foraminal \par narrowing.\par C4-C5: There is disc bulging, bony ridging, uncovertebral hypertrophy, and moderate bilateral foraminal \par narrowing left greater than right.\par C5-C6: There is disc bulging, bony ridging, uncovertebral hypertrophy, and encroachment upon bilateral exiting \par C6 nerve roots right greater than left.\par C6-C7: There is disc bulging, bony ridging, uncovertebral hypertrophy, and mild-to-moderate bilateral \par foraminal narrowing.\par \par Impression:\par 1. Straightening of the cervical lordosis and multilevel degenerative disc disease most severe at C5-C6 where \par there is encroachment upon right greater than left exiting C6 nerve roots.\par 2. No acute fracture or cord compression.\par \par \par All pertinent imaging independently reviewed and interpreted by me.  \par \par  [] : no [FreeTextEntry7] : b/l arms and fingers  [de-identified] : c mri

## 2023-01-13 NOTE — ASSESSMENT
[FreeTextEntry1] : This is STEFFANY JACINTO,  a 62 year-old female here for left arm pain with impairment in ADLs and functionality.  The pain has not responded to conservative care including NSAID therapy and/or physical therapy.  There is no bleeding tendency, unstable medical condition, or systemic infection.\par \par Based on history and physical, I suspect there are likely multiple pain generators, including diffuse cervical stenosis. \par \par I discussed the above at length with the patient, including prognosis, complications, and red flag symptoms.  We discussed a graded and multidisciplinary treatment plan, including physical therapy/HEP, medications, and/or interventional procedures.  The risks and benefits of each of these were addressed and all questions answered to the patient's apparent satisfaction.  After this discussion, the following plan was developed with the patient: \par \par 1. PT: Emphasized importance of PT/HEP as a mainstay of treatment. Pt to continue\par 2.  Medication(s): encouraged gabapentin use; discussed dose titration and expected side effects. \par 3.  Imaging/Labs: reviewed as above\par 4.  Procedure(s): Recommend C7-T1 MIESHA with fluoroscopic guidance.\par 5.  Follow-Up: for procedure, then 2 weeks after. \par \par The risks, benefits and alternatives of the proposed procedure were explained in detail with the patient. The risks outlined include but are not limited to infection, bleeding, post- dural puncture headache (for CAMACHO), nerve injury, a temporary increase in pain, failure to resolve symptoms, allergic reaction, and possible elevation of blood sugar in diabetics if using corticosteroid.  All questions were answered to patient's apparent satisfaction and he/she verbalized an understanding.\par \par Medications have been discussed and reconciled, adverse reactions and side effects have been reviewed with patient.  When appropriate, iSTOP/ has been checked and any aberrations discussed with patient.  \par \par

## 2023-01-13 NOTE — PHYSICAL EXAM
[de-identified] : General:  Awake and alert in no acute distress, \par Psych: normal mood and affect. \par HEENT: NC/AT, normal visual tracking\par Pulmonary: no resp distress, chest expansion appears symmetrical\par CV: extremities are warm and perfused\par Abd: non-distended\par Ext: no c/c/e\par \par Gait: WNL\par \par \par CERVICAL SPINE REGION:\par Inspection, cervical: normal, no listing of the head, no gross asymmetries.\par \par Active ROM of the cervical spine:\par Flexion:  full, painless\par Extension:   full, painless                            \par Right- Side-bending: full, painless\par Left-Side-bending: .full, painless		\par Rotation - Right:  full, painless    \par Rotation - Left:full, painless 		\par Oblique extension: Right-  full, painless\par Oblique extension: Left-  full, painless\par -ve Lhermitte's sign\par 		\par Palpation:  Tender at Cervical paraspinals\par 		\par Reflexes: Upper limbs:		RIGHT	   LEFT	\par Biceps	C5-6		                2+	    2+\par Brachioradialis	C5-6		2+                2+\par Triceps	C(6)7-8(1)		2+	    2+\par 		\par Strength, upper limbs: 	\par 5/5 in SA, EF, EE, WE, ADM, APB bilaterally except left SA 4/5\par 		\par Sensation: Upper limbs:\par Intact to light touch over C3-T1 bilateral UE dermatomes except diminished on dorsum of left hand\par 		\par Tests for cervical radiculopathy/myelopathy: 	\par Spurling’s sign: negative b/l\par \par Long tract signs for myelopathy/UMN process:                          \par Mendiola sign:                                          negative bilaterally			\par \par \par \par

## 2023-02-01 ENCOUNTER — APPOINTMENT (OUTPATIENT)
Dept: ORTHOPEDIC SURGERY | Facility: CLINIC | Age: 63
End: 2023-02-01

## 2023-02-02 ENCOUNTER — APPOINTMENT (OUTPATIENT)
Dept: PAIN MANAGEMENT | Facility: CLINIC | Age: 63
End: 2023-02-02
Payer: COMMERCIAL

## 2023-02-02 PROCEDURE — 62321 NJX INTERLAMINAR CRV/THRC: CPT

## 2023-02-02 NOTE — PROCEDURE
[FreeTextEntry3] : Date of Service: 02/02/2023   \par Account: [id number]  \par Patient: STEFFANY JACINTO   \par YOB: 1960  \par Age: 62 year  \par Surgeon:      Cecilia Sylvester D.O.  \par Assistant:    None  \par Anesthesia:  Local \par Pre-Operative Diagnosis:         Cervical Radiculopathy (M54.12)  \par Post Operative Diagnosis:       Cervical Radiculopathy (M54.12) \par \par Procedure:   Cervical (C7-T1) interlaminar epidural steroid injection under fluoroscopic guidance \par \par This procedure was carried out using fluoroscopic guidance.  The risks and benefits of the procedure were discussed extensively with the patient.  The consent of the patient was obtained and the following procedure was performed. The patient was placed in the prone position on the fluoroscopy table and the area was prepped and draped in a sterile fashion.  A timeout was performed with all essential staff present and the site and side were verified.  The cervical area was prepped and draped in a sterile fashion.  The fluoroscope visualized the C7-T1 interspace using slight cephalad-caudad angulation and this area was marked.  Using sterile technique the superficial skin was anesthetized with 1% Lidocaine using a 25-gauge 1.5 inch needle.  A 20 gauge 3.5 inch Tuohy needle was then advanced under fluoroscopy until ligament was engaged.  Using a contralateral oblique view, a "loss of resistance" to air technique was utilized in order to gain access to the epidural space.  After negative aspiration for heme and CSF, 1cc of Omnipaque contrast was administered and the appropriate cervical epidurogram was obtained in the AKUA and A/P view as well as digital subtraction angiography.  Subsequently, 4mL of a mixture containing of 2mL of preservative free normal saline and 2ml of 40mg/mL of Depomedrol was injected into the epidural space while maintaining meaningful verbal contact with the patient.    The needle was subsequently removed.  Vital signs remained normal.  Pulse oximeter was used throughout the procedure and the patient's pulse and oxygen saturation remained within normal limits.  The patient tolerated the procedure well.  There were no complications.  The patient was instructed to apply ice over the injection sites for twenty minutes every two hours for the next 24 to 48 hours.  \par \par Disposition:       \par 1. The patient was advised to F/U in 1-2 weeks to assess the response to the injection.      \par 2. The patient was also instructed to contact me immediately if there were any concerns related to the procedure performed.\par

## 2023-02-13 NOTE — HISTORY OF PRESENT ILLNESS
[Gradual] : gradual [8] : 8 [Burning] : burning [Radiating] : radiating [Sharp] : sharp [Stabbing] : stabbing [Constant] : constant [Nothing helps with pain getting better] : Nothing helps with pain getting better [Full time] : Work status: full time [de-identified] : Patient presents to the office for evaluation of continuous/intermittent neck pain with radiation down BUE which started about 1 month  which is not similar to past problems (50% neck 50% arms L>R).  Patient reports that this issue  does not interfere with daily activities and can be described as burning in nature.  There are no associated symptoms.  Patient reports putting on her clothes exacerbate symptoms and rest have improved symptoms.  She states that her symptoms have not improved since onset of pain.  Patient is taking OTC NSAIDs/naproxen/MDP with little to no relief and is not started pt for cervical spine. Patient denies fevers, acute weakness, unexpected weight loss, urinary incontinence, and bowel incontinence.\par  [] : no [FreeTextEntry5] : pain started about 1 month ago with no cause of injury [FreeTextEntry7] : b/l shoulders/arms/hands(lt side is worse) [de-identified] : movement of the lt shoulder/arm [de-identified] : pcp, orthopedic [de-identified] : mri done at [facility], xr c-spine done at  [de-identified] :

## 2023-02-13 NOTE — ASSESSMENT
[FreeTextEntry1] : Patient is a 62 year old in subacute cervical radiculopathy with BUE radicular pain.  Patient has trialed MDP, multiple types of nsaids that is not helped with her symptoms.  I recommend a visit with the pain management division to assess the need for a possible MIESHA.  She has a recent MRI showing multilevel disc disease with foraminal stenosis worst at C5-C6 on the right side.  I will also initiate cervical spine PT.  \par \par \par \par Prior to appointment and during encounter with patient extensive medical records were reviewed including but not limited to, hospital records, outpatient records, imaging results, and lab data. During this appointment the patient was examined, diagnoses were discussed and explained in a face to face manner. In addition extensive time was spent reviewing aforementioned diagnostic studies. Counseling including abnormal image results, differential diagnoses, treatment options, risk and benefits, lifestyle changes, current condition, and current medications was performed. Patient's comments, questions, and concerns were addressed and patient verbalized understanding. Based on this patient's presentation at our office, which is an orthopedic spine surgeon's office, this patient inherently / intrinsically has a risk, however minute, of developing issues such as Cauda equina syndrome, bowel and bladder changes, or progression of motor or neurological deficits such as paralysis which may be permanent.\par

## 2023-02-13 NOTE — PHYSICAL EXAM
[de-identified] : Constitutional:  \par - No acute distress  \par - Well developed; well nourished  \par   \par Neurological:  \par - normal mood and affect  \par - alert and oriented x 3   \par   \par Cardiovascular:  \par - grossly normal\par Cervical Spine Exam: \par   \par Inspection: \par   \par erythema (-)  \par ecchymosis (-)  \par rashes (-)  \par   \par Palpation:    \par                                                 \par Cervical paraspinal tenderness:     	R (-); L(-) \par Upper trapezius tenderness:          	R (-); L (-) \par Rhomboids tenderness:                  	R (-); L (-) \par Occipital Ridge:                                	R (-); L (-) \par Supraspinatus tenderness:             	R (-); L (-) \par   \par ROM:   \par ROM - full range of motion with mild stiffness \par Pain with extremes of extension \par   \par Strength Testing:        	Right	Left \par Deltoid                             (5/5)    (5/5) \par Biceps:                            (5/5)    (5/5) \par Triceps:                           (5/5)    (5/5) \par Finger Abductors:           (5/5)    (5/5) \par Grasp:                             (5/5)    (5/5) \par   \par Special Testing: \par Spurling Test:              	R (-); L (+) \par Facet load test:           	R (-); L (-) \par Hoffmans:                               R (-); L(-)\par   \par Neuro: \par SILT throughout right upper extremity \par SILT throughout left upper extremity \par   \par Reflexes: \par Biceps   -       	R (2+); L (2+) \par Triceps  -       	R (2+); L (2+) \par Brachioradialis- R (2+); L (2+) \par   \par No ankle clonus\par

## 2023-02-17 ENCOUNTER — APPOINTMENT (OUTPATIENT)
Dept: PAIN MANAGEMENT | Facility: CLINIC | Age: 63
End: 2023-02-17
Payer: COMMERCIAL

## 2023-02-17 VITALS — HEIGHT: 65 IN | BODY MASS INDEX: 22.49 KG/M2 | WEIGHT: 135 LBS

## 2023-02-17 DIAGNOSIS — M54.2 CERVICALGIA: ICD-10-CM

## 2023-02-17 PROCEDURE — 99213 OFFICE O/P EST LOW 20 MIN: CPT

## 2023-02-17 NOTE — HISTORY OF PRESENT ILLNESS
[Neck] : neck [Left Arm] : left arm [Burning] : burning [Household chores] : household chores [Sleep] : sleep [Nothing helps with pain getting better] : Nothing helps with pain getting better [Sitting] : sitting [Standing] : standing [Walking] : walking [7] : 7 [5] : 5 [Intermittent] : intermittent [FreeTextEntry1] : 02/17/2023: S/P C7-T1 MIESHA on 2/2/23 with 50% relief of pain and ADLs overall, but 100% relief of paresthesias in both arms.  She continues to have left shoulder pain with ROM which radiates to mid-bicep.  Symptoms in b/l hands and forearms have resolved. Feels shoulders "clicking" more than prior.   Graduated from neck PT; continues with HEP.  Continues with gabapentin 300mg TID and motrin 600mg qhs.  \par \par Initial HPI: \par 01/13/2023: This is STEFFANY JACINTO, a pleasant 62 year-old female referred to my office by Dr. Devine for initial evaluation of neck/arm pain\par \par Location of maximal pain: left arm\par Onset: 12/2022 while reaching for box\par Radiation pattern: down left arm into hand \par Associated symptoms:  +numbness, tingling in digits 2-4, +left arm weakness; starting to have sxs on right as well\par Patient denies bowel/bladder incontinence, saddle anesthesia, fevers, chills, weight loss, or recent falls.  \par \par Current Pain Medications:\par None\par \par Past Pain Medications:\par Trialed gabapentin x1wk - unsure of benefit\par MDP - minimal relief\par \par Prior Procedures/Treatments: \par None\par \par History of back surgery: \par None\par \par Blood thinners:\par None\par \par Physical Therapy:\par Currently participating \par \par --------------------------------------------------------\par Imaging Review:\par \par 12/16/22: MRI of the Cervical Spine ([facility])\par \par Findings: There is straightening of the cervical lordosis and multilevel disc desiccation with loss of disc height \par and degenerative endplate changes at C5-C6 and mild multilevel uncovertebral hypertrophy asymmetric \par towards the right in the mid-to-lower cervical spine without acute vertebral body fracture. The visualized \par posterior fossa structures appear unremarkable. The cervical spinal cord appears intact. There are no gross \par paravertebral soft tissue masses.\par C2-C3: There is left paracentral disc bulging, bony ridging, uncovertebral hypertrophy, and mild left foraminal \par narrowing.\par C3-C4: There is mild disc bulging, bony ridging, uncovertebral hypertrophy, and mild bilateral foraminal \par narrowing.\par C4-C5: There is disc bulging, bony ridging, uncovertebral hypertrophy, and moderate bilateral foraminal \par narrowing left greater than right.\par C5-C6: There is disc bulging, bony ridging, uncovertebral hypertrophy, and encroachment upon bilateral exiting \par C6 nerve roots right greater than left.\par C6-C7: There is disc bulging, bony ridging, uncovertebral hypertrophy, and mild-to-moderate bilateral \par foraminal narrowing.\par \par Impression:\par 1. Straightening of the cervical lordosis and multilevel degenerative disc disease most severe at C5-C6 where \par there is encroachment upon right greater than left exiting C6 nerve roots.\par 2. No acute fracture or cord compression.\par \par \par All pertinent imaging independently reviewed and interpreted by me.  \par \par  [] : no [FreeTextEntry7] : b/l shoulders  [de-identified] : c mri  [TWNoteComboBox1] : 50%

## 2023-02-17 NOTE — PHYSICAL EXAM
[de-identified] : General:  Awake and alert in no acute distress, \par Psych: normal mood and affect. \par HEENT: NC/AT, normal visual tracking\par Pulmonary: no resp distress, chest expansion appears symmetrical\par CV: extremities are warm and perfused\par Abd: non-distended\par Ext: no c/c/e\par \par Gait: WNL\par \par \par CERVICAL SPINE REGION:\par Inspection, cervical: normal, no listing of the head, no gross asymmetries.\par \par Active ROM of the cervical spine:\par Flexion:  full, painless\par Extension:   full, painless                            \par Right- Side-bending: full, painless\par Left-Side-bending: .full, painless		\par Rotation - Right:  full, painless    \par Rotation - Left:full, painless 		\par Oblique extension: Right-  full, painless\par Oblique extension: Left-  full, painless\par -ve Lhermitte's sign\par 		\par Palpation:  Tender at Cervical paraspinals\par 		\par Reflexes: Upper limbs:		RIGHT	   LEFT	\par Biceps	C5-6		                2+	    2+\par Brachioradialis	C5-6		2+                2+\par Triceps	C(6)7-8(1)		2+	    2+\par 		\par Strength, upper limbs: 	\par 5/5 in SA, EF, EE, WE, ADM, APB bilaterally except left SA 4/5\par 		\par Sensation: Upper limbs:\par Intact to light touch over C3-T1 bilateral UE dermatomes except diminished on dorsum of left hand\par 		\par Tests for cervical radiculopathy/myelopathy: 	\par Spurling’s sign: negative b/l\par \par Long tract signs for myelopathy/UMN process:                          \par Mendiola sign:     negative bilaterally			\par \par RIGHT SHOULDER: \par Flexion and abdcution limited by pain (approx 110deg)\par +barksdale, +neer's, +empty can\par

## 2023-02-17 NOTE — ASSESSMENT
[FreeTextEntry1] : This is STEFFANY JACINTO, a 62 year-old female here for left arm pain with impairment in ADLs and functionality. The pain has not responded to conservative care including NSAID therapy and/or physical therapy. There is no bleeding tendency, unstable medical condition, or systemic infection.\par \par Based on history and physical, I suspect there are likely multiple pain generators, including diffuse cervical stenosis and left RTC pathology. \par \par Interventional History: \par 2/2/23: C7-T1 MIESHA, 50% relief of shoulder pain, 100% of b/l UE paresthesias \par \par I discussed the above at length with the patient, including prognosis, complications, and red flag symptoms. We discussed a graded and multidisciplinary treatment plan, including physical therapy/HEP, medications, and/or interventional procedures. The risks and benefits of each of these were addressed and all questions answered to the patient's apparent satisfaction. After this discussion, the following plan was developed with the patient: \par \par 1. PT: Emphasized importance of PT/HEP as a mainstay of treatment. Pt to continue HEP.\par 2. Medication(s): Continue gabapentin. Advised limiting motrin and alternating with tylenol.  \par 3. Imaging/Labs: reviewed as above\par 4. Procedure(s): We discussed repeat C7-T1 MIESHA with fluoroscopic guidance, though I suspect RTC pathology is contributing more to her current pain than cervical pathology is.  Pt to defer MIESHA for now.  She will start acupuncture next week. \par 5. Follow-Up: 6-8 weeks.  Follow up with ortho re: shoulder (has seen in the past)\par \par The risks, benefits and alternatives of the proposed procedure were explained in detail with the patient. The risks outlined include but are not limited to infection, bleeding, post- dural puncture headache (for CAMACHO), nerve injury, a temporary increase in pain, failure to resolve symptoms, allergic reaction, and possible elevation of blood sugar in diabetics if using corticosteroid. All questions were answered to patient's apparent satisfaction and he/she verbalized an understanding.\par \par Medications have been discussed and reconciled, adverse reactions and side effects have been reviewed with patient. When appropriate, iSTOP/ has been checked and any aberrations discussed with patient. \par

## 2023-03-01 ENCOUNTER — APPOINTMENT (OUTPATIENT)
Dept: ORTHOPEDIC SURGERY | Facility: CLINIC | Age: 63
End: 2023-03-01

## 2023-03-07 ENCOUNTER — APPOINTMENT (OUTPATIENT)
Dept: PAIN MANAGEMENT | Facility: CLINIC | Age: 63
End: 2023-03-07
Payer: COMMERCIAL

## 2023-03-07 VITALS — BODY MASS INDEX: 22.49 KG/M2 | HEIGHT: 65 IN | WEIGHT: 135 LBS

## 2023-03-07 DIAGNOSIS — M48.02 SPINAL STENOSIS, CERVICAL REGION: ICD-10-CM

## 2023-03-07 DIAGNOSIS — M54.12 RADICULOPATHY, CERVICAL REGION: ICD-10-CM

## 2023-03-07 PROCEDURE — 99213 OFFICE O/P EST LOW 20 MIN: CPT

## 2023-03-09 NOTE — HISTORY OF PRESENT ILLNESS
[Neck] : neck [Sudden] : sudden [5] : 5 [Burning] : burning [Intermittent] : intermittent [Leisure] : leisure [Meds] : meds [FreeTextEntry1] : 3/7/2023 - Patient presents for reevaluation regarding her neck/left upper extremity pain.  Patient was previously a Dr. Land.  Patient with significant relief of left upper extremity pain particularly distal to the elbow.  She still complains of pain in the left shoulder and upper arm particular with overhead activity. She completed 6 weeks of PT with no reported benefit. Patient reports having received a left shoulder "cortisone shot" with Dr. Jean with significant relief from left shoulder pain.  This lasted about 2 weeks.  Patient has failed gabapentin 900 mg TID, she takes 600 mg ibuprofen for pain management.\par \par Injections:\par 1) Left SA shoulder CSI - 12/14/22 (T[facility])\par 2) C7-T1 MIESHA (2/2/2023)- Dr. Land\par \par Pertinent Surgical History: N/A \par \par Imaging: \par \par MRI Cervical Spine (12/16/2022) - [facility]\par \par Findings: There is straightening of the cervical lordosis and multilevel disc desiccation with loss of disc height \par and degenerative endplate changes at C5-C6 and mild multilevel uncovertebral hypertrophy asymmetric \par towards the right in the mid-to-lower cervical spine without acute vertebral body fracture. The visualized \par posterior fossa structures appear unremarkable. The cervical spinal cord appears intact. There are no gross \par paravertebral soft tissue masses.\par C2-C3: There is left paracentral disc bulging, bony ridging, uncovertebral hypertrophy, and mild left foraminal \par narrowing.\par C3-C4: There is mild disc bulging, bony ridging, uncovertebral hypertrophy, and mild bilateral foraminal \par narrowing.\par C4-C5: There is disc bulging, bony ridging, uncovertebral hypertrophy, and moderate bilateral foraminal \par narrowing left greater than right.\par C5-C6: There is disc bulging, bony ridging, uncovertebral hypertrophy, and encroachment upon bilateral exiting \par C6 nerve roots right greater than left.\par C6-C7: There is disc bulging, bony ridging, uncovertebral hypertrophy, and mild-to-moderate bilateral \par foraminal narrowing.\par \par Physician Disclaimer: I have personally reviewed and confirmed all HPI data with the patient.\par \par -----------------------------------------------------------------------------------------------------------------------------------------\par                                                            *** PREVIOUS DR. LAND NOTES***\par 02/17/2023: S/P C7-T1 MIESHA on 2/2/23 with 50% relief of pain and ADLs overall, but 100% relief of paresthesias in both arms.  She continues to have left shoulder pain with ROM which radiates to mid-bicep.  Symptoms in b/l hands and forearms have resolved. Feels shoulders "clicking" more than prior.   Graduated from neck PT; continues with HEP.  Continues with gabapentin 300mg TID and motrin 600mg qhs.  \par \par Initial HPI: \par 01/13/2023: This is STEFFANY JACINTO, a pleasant 62 year-old female referred to my office by Dr. Devine for initial evaluation of neck/arm pain\par \par Location of maximal pain: left arm\par Onset: 12/2022 while reaching for box\par Radiation pattern: down left arm into hand \par Associated symptoms:  +numbness, tingling in digits 2-4, +left arm weakness; starting to have sxs on right as well\par Patient denies bowel/bladder incontinence, saddle anesthesia, fevers, chills, weight loss, or recent falls.   [] : no [FreeTextEntry7] : shoulder and arms [de-identified] : [facility]

## 2023-03-09 NOTE — ASSESSMENT
[FreeTextEntry1] : A discussion regarding available pain management treatment options occurred with the patient.  These included interventional, rehabilitative, pharmacological, and alternative modalities. We will proceed with the following:\par \par Patient presenting with left upper extremity pain which is seemingly multifactorial in origin.  Appears to be some radicular component which was largely resolved with MIESHA.  Current predominant pain is focused in the shoulder with associated reduced range of motion and weakness.  Concern for RTC tear.\par \par Interventional treatment options:\par - Can consider repeat left PM C7-T1 MIESHA with return of severe left radicular pain\par - Defer interventional treatment for the left shoulder to orthopedics  \par - see additional instructions below  \par \par Rehabilitative options:\par - Failed physical therapy trial x 6 weeks\par - participation in active HEP was discussed as tolerated\par \par Medication based treatment options:\par - continue gabapentin 300 mg TID; further titration based upon clinical response\par - Patient failed MDP; continue OTC NSAIDs as needed\par - see additional instructions below  \par \par Complementary treatment options:\par - Weight management and lifestyle modifications discussed\par \par Additional treatment recommendations as follows:\par - Obtain MRI left shoulder; evaluate for RTC tear\par - Follow-up for MRI review\par - May require orthopedic left shoulder reevaluation with Dr. Jean\par \par Medications have been discussed and reconciled, adverse reactions and side effects have been reviewed with patient. When appropriate, iSTOP/ has been checked and any aberrations discussed with patient. \par \par The documentation recorded by the scribe, in my presence, accurately reflects the service I personally performed and the decisions made by me with my edits as appropriate. \par \par I, Amilcar Chambers acting as scribe, attest that this documentation has been prepared under the direction and in the presence of Provider Miguel Brooke DO.

## 2023-03-09 NOTE — PHYSICAL EXAM
[Left] : left shoulder [de-identified] : Constitutional:  \par - No acute distress  \par - Well developed; well nourished  \par \par Neurological:  \par - normal mood and affect  \par - alert and oriented x 3   \par \par Cardiovascular:  \par - grossly normal \par \par Cervical Spine Exam: \par \par Inspection:  \par erythema (-)  \par ecchymosis (-)  \par rashes (-)  \par \par Palpation:                                                   \par Cervical paraspinal tenderness:         R (-); L(-) \par Upper trapezius tenderness:              R (-); L (-) \par Rhomboids tenderness:                      R (-); L (-) \par Occipital Ridge:                                    R (-); L (-) \par Supraspinatus tenderness:                 R (-); L (-) \par \par ROM: Full ROM no pain\par \par Strength Testing:             \par Deltoid                           R (5/5); L (5/5) \par Biceps:                          R (5/5); L (5/5) \par Triceps:                         R (5/5); L (5/5) \par Finger Abductors:         R (5/5); L (5/5) \par Grasp:                           R (5/5); L (5/5) \par \par Special Testing: \par Spurling Test:                  R (-); L (-) \par Facet load test:               R (-); L (-) \par \par Neuro: \par SILT throughout right upper extremity \par SILT throughout left upper extremity \par \par Reflexes: \par Biceps   -           R (2+); L (2+) \par Triceps  -           R (2+); L (2+) \par Brachioradialis- R (2+); L (2+)   \par \par No ankle clonus  [Sitting] : sitting [Moderate] : moderate [5 ___] : forward flexion 5[unfilled]/5 [5___] : abduction 5[unfilled]/5 [4___] : internal rotation 4[unfilled]/5 [] : positive impingement testing

## 2023-03-14 ENCOUNTER — RESULT REVIEW (OUTPATIENT)
Age: 63
End: 2023-03-14

## 2023-03-22 ENCOUNTER — APPOINTMENT (OUTPATIENT)
Dept: ORTHOPEDIC SURGERY | Facility: CLINIC | Age: 63
End: 2023-03-22
Payer: COMMERCIAL

## 2023-03-22 PROCEDURE — 20611 DRAIN/INJ JOINT/BURSA W/US: CPT | Mod: LT

## 2023-03-22 PROCEDURE — 99214 OFFICE O/P EST MOD 30 MIN: CPT | Mod: 25,57

## 2023-03-22 NOTE — DISCUSSION/SUMMARY
[de-identified] : LUE: pain radiating into hands and fingers with abduction.\par \par 62-year-old female with left worse than right pain radiating down the shoulder into the elbow and forearm.\par MRI demonstrates mild glenohumeral OA as well as low grade partial thickness RC tear. \par \par GH injection was given today for diagnostic and therapeutic purposes.\par Briefly discussed arthroscopic surgical intervention if she continues to have pain despite conservative treatment. \par She will continue with HEP to facilitate ROM and strengthening. \par \par (1) We discussed a comprehensive treatment plans that included a prescription management plan involving the\par use of prescription strength medications to include Ibuprofen 600-800 mg TID, versus 500-650 mg Tylenol. We\par also discussed prescribing topical diclofenac (Voltaren gel) as well as once daily Meloxicam 15 mg.\par (2) The patient has More Than One chronic injuries/illnesses as outlined, discussed, and documented by ICD 10\par codes listed, as well as the HPI and Plan section.\par There is a moderate risk of morbidity with further treatment, especially from use of prescription strength\par medications and possible side effects of these medications which include upset stomach and cardiac/renal issues\par with long term use were discussed.\par (3) I recommended that the patient follow-up with their medical physician to discuss any significant specific\par potential issues with long term use such as interactions with current medications or with exacerbation of\par underlying medical morbidities. \par \par Attestation:\par \par I, Juli Gaviria, attest that this documentation has been prepared under the direction and in the presence of Provider Calixto Jean MD.\par \par The documentation recorded by the scribe, in my presence, accurately reflects the service I personally performed, and the decisions made by me with my edits as appropriate.\par \par Calixto Jean MD

## 2023-03-22 NOTE — HISTORY OF PRESENT ILLNESS
[de-identified] : 61yo female presenting to the office with b/l shoulder pain x 3 months. She reports no injury or trauma. Patient describes pain as burning and constant. She does report radiating pain down her left UE. Currently Left shoulder is greater than right. She presents today with xrays from [facility] for review. Also reports recent c-spine xrays. \par Patient is RHD, no PMHx, works as .\par \par 3/22/23: Patient states she received significant benefit from previous injection. She continues to have persistent pain and issues with ROM. She also states she has pain when she rotates her shoulder around to her back.

## 2023-03-22 NOTE — PROCEDURE
[FreeTextEntry3] : LEFT SHOULDER: \par Large Joint Injection was performed because of pain and inflammation.\par \par Anesthesia: ethyl chloride sprayed topically..\par \par Depomedrol: An injection of Depomedrol 40 mg , 1 cc.\par \par Lidocaine: 3 cc.\par \par Medication was injected in the right glenohumeral. Patient has tried OTC's including aspirin, Ibuprofen, Aleve etc or prescription NSAIDS, and/or exercises at home and/ or physical therapy without satisfactory response and Patient has decreased mobility in the joint. After verbal consent using sterile preparation and technique. The risks, benefits, and alternatives to cortisone injection were explained in full to the patient. Risks outlined include but are not limited to infection, sepsis, bleeding, scarring, skin discoloration, temporary increase in pain, syncopal episode, failure to resolve symptoms, allergic reaction, symptom recurrence, and elevation of blood sugar in diabetics. Patient understood the risks. All questions were answered. After discussion of options, patient requested an injection. Oral informed consent was obtained and sterile prep was done of the injection site. Sterile technique was utilized for the procedure including the preparation of the solutions used for the injection. Patient tolerated the procedure well. Advised to ice the injection site this evening. Prep with alcohol locally to site. Sterile technique used. Patient tolerated procedure well. Post Procedure Instructions: Patient was advised to call if redness, pain, or fever occur and apply ice for 15 min. out of every hour for the next 12-24 hours as tolerated. patient was advised to rest the joint(s) for 7 days.\par \par Ultrasound Guidance was used for the following reasons: prior failure or difficult injection.\par \par Ultrasound guided injection was performed of the shoulder, visualization of the needle and placement of injection was performed without complication.

## 2023-03-22 NOTE — PHYSICAL EXAM
[de-identified] : Constitutional: The general appearance of the patient is well developed, well nourished, no deformities and well groomed. Normal \par \par Gait: Gait and function is as follows: normal gait. \par \par Skin: Head and neck visualized skin is normal. Left upper extremity visualized skin is normal. Right upper extremity visualized skin is normal. Thoracic Skin of the thoracic spine shows visualized skin is normal. \par \par Cardiovascular: palpable radial pulse bilaterally, good capillary refill in digits of the bilateral upper extremities and no temperature or color changes in the bilateral upper extremities. \par \par Lymphatic: Normal Palpation of lymph nodes in the cervical. \par \par Neurologic: fine motor control in the bilateral upper extremities is intact. Deep Tendon Reflexes in Upper and Lower Extremities Negative Mendiola's in the bilateral upper extremities. The patient is oriented to time, place and person. Sensation to light touch intact in the bilateral upper extremities. Mood and Affect is normal. \par \par Right Shoulder: Inspection of the shoulder/upper arm is as follows: Stable shoulder. Palpation of the shoulder/upper arm is as follows: There is tenderness at the AC joint, proximal biceps tendon and supraspinatus tendon. Range of motion of the shoulder is as follows: Pain with internal rotation, external rotation, abduction and forward flexion. Strength of the shoulder is as follows: Supraspinatus 4/5. External Rotation 4/5. Internal Rotation 4/5. Infraspinatus 5/5 4/5. Deltoid 5/5 Ligament Stability and Special Tests of the shoulder is as follows: Neer test is positive. Newman' test is positive. Speed's test is positive. \par \par Left Shoulder: Inspection of the shoulder/upper arm is as follows: Stable shoulder. Palpation of the shoulder/upper arm is as follows: There is tenderness at the AC joint, proximal biceps tendon and supraspinatus tendon. Range of motion of the shoulder is as follows: Pain with internal rotation, external rotation, abduction and forward flexion. Strength of the shoulder is as follows: Supraspinatus 4/5. External Rotation 4/5. Internal Rotation 4/5. Infraspinatus 5/5 4/5. Deltoid 5/5 Ligament Stability and Special Tests of the shoulder is as follows: Neer test is positive. Newman' test is positive. Speed's test is positive.\par \par Neck: Inspection / Palpation of the cervical spine is as follows: There is a full, pain free, stable range of motion of the cervical spine with normal tone and no tenderness to palpation. \par \par Back, including spine: Inspection / Palpation of the thoracic/lumbar spine is as follows: There is a full, pain free, stable range of motion of the thoracic spine with a normal tone and not tenderness to palpation.. \par \par

## 2023-03-22 NOTE — DATA REVIEWED
[MRI] : MRI [Left] : left [Shoulder] : shoulder [Report was reviewed and noted in the chart] : The report was reviewed and noted in the chart [I independently reviewed and interpreted images and report] : I independently reviewed and interpreted images and report [I reviewed the films/CD and additionally noted] : I reviewed the films/CD and additionally noted [FreeTextEntry1] : mild AC OA, GH OA and low grade partial thickness tearing

## 2023-04-24 ENCOUNTER — APPOINTMENT (OUTPATIENT)
Dept: PAIN MANAGEMENT | Facility: CLINIC | Age: 63
End: 2023-04-24

## 2023-08-15 ENCOUNTER — OFFICE (OUTPATIENT)
Dept: URBAN - METROPOLITAN AREA CLINIC 112 | Facility: CLINIC | Age: 63
Setting detail: OPHTHALMOLOGY
End: 2023-08-15
Payer: COMMERCIAL

## 2023-08-15 DIAGNOSIS — H43.393: ICD-10-CM

## 2023-08-15 DIAGNOSIS — H25.13: ICD-10-CM

## 2023-08-15 DIAGNOSIS — H52.4: ICD-10-CM

## 2023-08-15 DIAGNOSIS — H17.9: ICD-10-CM

## 2023-08-15 PROCEDURE — 99204 OFFICE O/P NEW MOD 45 MIN: CPT | Performed by: OPHTHALMOLOGY

## 2023-08-15 PROCEDURE — 92025 CPTRIZED CORNEAL TOPOGRAPHY: CPT | Performed by: OPHTHALMOLOGY

## 2023-08-15 PROCEDURE — 92250 FUNDUS PHOTOGRAPHY W/I&R: CPT | Performed by: OPHTHALMOLOGY

## 2023-08-15 PROCEDURE — 92015 DETERMINE REFRACTIVE STATE: CPT | Performed by: OPHTHALMOLOGY

## 2023-08-15 ASSESSMENT — CONFRONTATIONAL VISUAL FIELD TEST (CVF)
OS_FINDINGS: FULL
OD_FINDINGS: FULL

## 2023-08-15 ASSESSMENT — REFRACTION_MANIFEST
OD_CYLINDER: -0.25
OD_VA2: 20/20
OD_VA1: 20/20
OS_AXIS: 005
OS_VA2: 20/20
OS_SPHERE: +0.75
OD_SPHERE: +0.75
OD_ADD: +2.25
OS_VA1: 20/20
OS_CYLINDER: -0.25
OD_AXIS: 115
OS_ADD: +2.25

## 2023-08-15 ASSESSMENT — REFRACTION_AUTOREFRACTION
OS_AXIS: 003
OD_CYLINDER: -0.25
OD_SPHERE: +0.75
OD_AXIS: 115
OS_CYLINDER: -0.25
OS_SPHERE: +0.75

## 2023-08-15 ASSESSMENT — REFRACTION_CURRENTRX
OD_OVR_VA: 20/
OS_OVR_VA: 20/
OS_SPHERE: +2.50
OD_SPHERE: +2.50

## 2023-08-15 ASSESSMENT — KERATOMETRY
OD_K2POWER_DIOPTERS: 44.25
OD_AXISANGLE_DEGREES: 060
OS_K2POWER_DIOPTERS: 44.00
OD_K1POWER_DIOPTERS: 43.50
OS_AXISANGLE_DEGREES: 090
OS_K1POWER_DIOPTERS: 44.00

## 2023-08-15 ASSESSMENT — AXIALLENGTH_DERIVED
OD_AL: 23.2167
OS_AL: 23.1723
OD_AL: 23.2167
OS_AL: 23.1723

## 2023-08-15 ASSESSMENT — SPHEQUIV_DERIVED
OD_SPHEQUIV: 0.625
OS_SPHEQUIV: 0.625
OD_SPHEQUIV: 0.625
OS_SPHEQUIV: 0.625

## 2023-08-15 ASSESSMENT — TONOMETRY
OD_IOP_MMHG: 15
OS_IOP_MMHG: 17

## 2023-08-15 ASSESSMENT — VISUAL ACUITY
OD_BCVA: 20/30-
OS_BCVA: 20/40

## 2023-08-24 NOTE — ED ADULT TRIAGE NOTE - GLASGOW COMA SCALE: EYE OPENING, MLM
An attempt to irrigate the patient's catheter was performed, but the catheter was clogged near the tip of the cath.      Celine Julio RN  08/24/23 1534 (E4) spontaneous

## 2023-12-06 NOTE — ASU PATIENT PROFILE, ADULT - PATIENT REPRESENTATIVE NAME
Pt needs Trazodone 50 mg to say 2 tabs at bedtime and quantity changed.     [facility] in [place] [address]

## 2024-04-27 ENCOUNTER — OFFICE (OUTPATIENT)
Dept: URBAN - METROPOLITAN AREA CLINIC 12 | Facility: CLINIC | Age: 64
Setting detail: OPHTHALMOLOGY
End: 2024-04-27
Payer: COMMERCIAL

## 2024-04-27 DIAGNOSIS — H35.373: ICD-10-CM

## 2024-04-27 DIAGNOSIS — H25.13: ICD-10-CM

## 2024-04-27 PROBLEM — H43.393 VITREOUS FLOATERS; BOTH EYES: Status: ACTIVE | Noted: 2024-04-27

## 2024-04-27 PROBLEM — E11.9 DIABETES TYPE 2 NO RETINOPATHY: Status: ACTIVE | Noted: 2024-04-27

## 2024-04-27 PROBLEM — H52.13 S/P LASIK OU: Status: ACTIVE | Noted: 2024-04-27

## 2024-04-27 PROBLEM — H52.7 REFRACTIVE ERROR: Status: ACTIVE | Noted: 2024-04-27

## 2024-04-27 PROCEDURE — 99204 OFFICE O/P NEW MOD 45 MIN: CPT | Performed by: OPHTHALMOLOGY

## 2024-04-27 PROCEDURE — 92134 CPTRZ OPH DX IMG PST SGM RTA: CPT | Performed by: OPHTHALMOLOGY

## 2024-05-01 ENCOUNTER — APPOINTMENT (OUTPATIENT)
Dept: ORTHOPEDIC SURGERY | Facility: CLINIC | Age: 64
End: 2024-05-01
Payer: COMMERCIAL

## 2024-05-01 DIAGNOSIS — M75.52 BURSITIS OF LEFT SHOULDER: ICD-10-CM

## 2024-05-01 PROCEDURE — 99214 OFFICE O/P EST MOD 30 MIN: CPT | Mod: 25,57

## 2024-05-01 PROCEDURE — 20611 DRAIN/INJ JOINT/BURSA W/US: CPT | Mod: RT

## 2024-05-01 RX ORDER — METHYLPREDNISOLONE 4 MG/1
4 TABLET ORAL
Qty: 1 | Refills: 0 | Status: ACTIVE | COMMUNITY
Start: 2024-05-01 | End: 1900-01-01

## 2024-05-01 NOTE — PHYSICAL EXAM
[de-identified] : Constitutional: The general appearance of the patient is well developed, well nourished, no deformities and well groomed. Normal  Gait: Gait and function is as follows: normal gait.  Skin: Head and neck visualized skin is normal. Left upper extremity visualized skin is normal. Right upper extremity visualized skin is normal. Thoracic Skin of the thoracic spine shows visualized skin is normal.  Cardiovascular: palpable radial pulse bilaterally, good capillary refill in digits of the bilateral upper extremities and no temperature or color changes in the bilateral upper extremities.  Lymphatic: Normal Palpation of lymph nodes in the cervical.  Neurologic: fine motor control in the bilateral upper extremities is intact. Deep Tendon Reflexes in Upper and Lower Extremities Negative Mendiola's in the bilateral upper extremities. The patient is oriented to time, place and person. Sensation to light touch intact in the bilateral upper extremities. Mood and Affect is normal.  Right Shoulder: Inspection of the shoulder/upper arm is as follows: no scapula winging, no biceps deformity and no AC joint deformity. Palpation of the shoulder/upper arm is as follows: There is tenderness at the proximal biceps tendon. Range of motion of the shoulder is as follows: Pain with internal rotation, external rotation, abduction and forward flexion. Strength of the shoulder is as follows: Supraspinatus 4/5. External Rotation 4/5. Internal Rotation 4/5. Infraspinatus 5/5 4/5. Deltoid 5/5 Ligament Stability and Special Tests of the shoulder is as follows: Neer test is positive. Newman' test is positive.  Left Shoulder: Inspection of the shoulder/upper arm is as follows: no scapula winging, no biceps deformity and no AC joint deformity. Palpation of the shoulder/upper arm is as follows: There is tenderness at the proximal biceps tendon. Range of motion of the shoulder is as follows: Pain with internal rotation, external rotation, abduction and forward flexion. Strength of the shoulder is as follows: Supraspinatus 4/5. External Rotation 4/5. Internal Rotation 4/5. Infraspinatus 5/5 4/5. Deltoid 5/5 Ligament Stability and Special Tests of the shoulder is as follows: Neer test is positive. Newman' test is positive.  Neck:  Inspection / Palpation of the cervical spine is as follows: mild paracervical tenderness. Range of motion of the cervical spine is as follows: moderately decreased range of motion of the cervical spine. Stability testing for the cervical spine is as follows Stable range of motion.  Back, including spine: Inspection / Palpation of the thoracic/lumbar spine is as follows: There is a full, pain free, stable range of motion of the thoracic spine with a normal tone and not tenderness to palpation..

## 2024-05-01 NOTE — DISCUSSION/SUMMARY
[de-identified] : LUE: pain radiating into hands and fingers with abduction.  64-year-old female with right worse than left pain radiating down the shoulder into the elbow and forearm. LT shoulder MRI demonstrates mild glenohumeral OA as well as low grade partial thickness RC tear.  Pt was treated with an US guided RT shoulder GHJ for diagnostic and therapeutic purposes MDP prescribed  Discussed if she experiences significant relief we may consider left glenohumeral injection. Also consider referral to spine specialist to rule out radicular pain stemming from cervical spine.  Follow up 2 weeks  (1) We discussed a comprehensive treatment plans that included a prescription management plan involving the use of prescription strength medications to include Ibuprofen 600-800 mg TID, versus 500-650 mg Tylenol. We also discussed prescribing topical diclofenac (Voltaren gel) as well as once daily Meloxicam 15 mg. (2) The patient has More Than One chronic injuries/illnesses as outlined, discussed, and documented by ICD 10 codes listed, as well as the HPI and Plan section. There is a moderate risk of morbidity with further treatment, especially from use of prescription strength medications and possible side effects of these medications which include upset stomach and cardiac/renal issues with long term use were discussed. (3) I recommended that the patient follow-up with their medical physician to discuss any significant specific potential issues with long term use such as interactions with current medications or with exacerbation of underlying medical morbidities.   Attestation: I, Apryl Hoffman , attest that this documentation has been prepared under the direction and in the presence of Provider Calixto Jean MD. The documentation recorded by the scribe, in my presence, accurately reflects the service I personally performed, and the decisions made by me with my edits as appropriate. Calixto Jean MD

## 2024-05-01 NOTE — HISTORY OF PRESENT ILLNESS
[de-identified] : 61yo female presenting to the office with b/l shoulder pain x 3 months. She reports no injury or trauma. Patient describes pain as burning and constant. She does report radiating pain down her left UE. Currently Left shoulder is greater than right. She presents today with xrays from [facility] for review. Also reports recent c-spine xrays.  Patient is RHD, no PMHx, works as .  3/22/23: Patient states she received significant benefit from previous injection. She continues to have persistent pain and issues with ROM. She also states she has pain when she rotates her shoulder around to her back.   5/1/24: Patient here for bilateral shoulder pain. Right shoulder is worse than left. Patient complains of a burning sensation in both shoulders that radiates down to her elbow.

## 2024-05-15 ENCOUNTER — APPOINTMENT (OUTPATIENT)
Dept: ORTHOPEDIC SURGERY | Facility: CLINIC | Age: 64
End: 2024-05-15
Payer: COMMERCIAL

## 2024-05-15 DIAGNOSIS — M25.811 OTHER SPECIFIED JOINT DISORDERS, RIGHT SHOULDER: ICD-10-CM

## 2024-05-15 DIAGNOSIS — M25.511 PAIN IN RIGHT SHOULDER: ICD-10-CM

## 2024-05-15 DIAGNOSIS — M25.512 PAIN IN LEFT SHOULDER: ICD-10-CM

## 2024-05-15 DIAGNOSIS — M19.011 PRIMARY OSTEOARTHRITIS, RIGHT SHOULDER: ICD-10-CM

## 2024-05-15 PROCEDURE — 99214 OFFICE O/P EST MOD 30 MIN: CPT | Mod: 25,57

## 2024-05-15 PROCEDURE — 20611 DRAIN/INJ JOINT/BURSA W/US: CPT | Mod: LT

## 2024-05-15 NOTE — PROCEDURE
[FreeTextEntry3] : Large Joint Injection was performed because of pain and inflammation.  Anesthesia: ethyl chloride sprayed topically..  Depomedrol: An injection of Depomedrol 40 mg , 1 cc.  Lidocaine: 3 cc.  Medication was injected in the left GH joint. Patient has tried OTC's including aspirin, Ibuprofen, Aleve etc or prescription NSAIDS, and/or exercises at home and/ or physical therapy without satisfactory response and Patient has decreased mobility in the joint. After verbal consent using sterile preparation and technique. The risks, benefits, and alternatives to cortisone injection were explained in full to the patient. Risks outlined include but are not limited to infection, sepsis, bleeding, scarring, skin discoloration, temporary increase in pain, syncopal episode, failure to resolve symptoms, allergic reaction, symptom recurrence, and elevation of blood sugar in diabetics. Patient understood the risks. All questions were answered. After discussion of options, patient requested an injection. Oral informed consent was obtained and sterile prep was done of the injection site. Sterile technique was utilized for the procedure including the preparation of the solutions used for the injection. Patient tolerated the procedure well. Advised to ice the injection site this evening. Prep with alcohol locally to site. Sterile technique used. Patient tolerated procedure well. Post Procedure Instructions: Patient was advised to call if redness, pain, or fever occur and apply ice for 15 min. out of every hour for the next 12-24 hours as tolerated. patient was advised to rest the joint(s) for 7 days.  Ultrasound Guidance was used for the following reasons: prior failure or difficult injection.  Ultrasound guided injection was performed of the shoulder, visualization of the needle and placement of injection was performed without complication.

## 2024-05-15 NOTE — DISCUSSION/SUMMARY
[de-identified] : LUE: pain radiating into hands and fingers with abduction.  64-year-old female with right worse than left pain radiating down the shoulder into the elbow and forearm. LT shoulder MRI demonstrates mild glenohumeral OA as well as low grade partial thickness RC tear.  Pt was treated with an US guided LT shoulder GHJ for diagnostic and therapeutic purposes  Also consider referral to spine specialist to rule out radicular pain stemming from cervical spine. Pt being followed by outside provider for cervical Follow up PRN  (1) We discussed a comprehensive treatment plans that included a prescription management plan involving the use of prescription strength medications to include Ibuprofen 600-800 mg TID, versus 500-650 mg Tylenol. We also discussed prescribing topical diclofenac (Voltaren gel) as well as once daily Meloxicam 15 mg. (2) The patient has More Than One chronic injuries/illnesses as outlined, discussed, and documented by ICD 10 codes listed, as well as the HPI and Plan section. There is a moderate risk of morbidity with further treatment, especially from use of prescription strength medications and possible side effects of these medications which include upset stomach and cardiac/renal issues with long term use were discussed. (3) I recommended that the patient follow-up with their medical physician to discuss any significant specific potential issues with long term use such as interactions with current medications or with exacerbation of underlying medical morbidities.   Attestation: I, Kassandra Pennington, attest that this documentation has been prepared under the direction and in the presence of Provider Calixto Jean MD. The documentation recorded by the scribe, in my presence, accurately reflects the service I personally performed, and the decisions made by me with my edits as appropriate. Calixto Jean MD

## 2024-05-15 NOTE — PHYSICAL EXAM

## 2024-05-15 NOTE — HISTORY OF PRESENT ILLNESS
[de-identified] : 63yo female presenting to the office with b/l shoulder pain x 3 months. She reports no injury or trauma. Patient describes pain as burning and constant. She does report radiating pain down her left UE. Currently Left shoulder is greater than right. She presents today with xrays from [facility] for review. Also reports recent c-spine xrays.  Patient is RHD, no PMHx, works as .  3/22/23: Patient states she received significant benefit from previous injection. She continues to have persistent pain and issues with ROM. She also states she has pain when she rotates her shoulder around to her back.   5/1/24: Patient here for bilateral shoulder pain. Right shoulder is worse than left. Patient complains of a burning sensation in both shoulders that radiates down to her elbow.   05/15/2024: Patient here for follow up for LAWANDA shoulder pain. She reports that the previous GHJ injection to the right offered significant relief, and would like it done in the left as well.

## 2024-07-15 ENCOUNTER — APPOINTMENT (OUTPATIENT)
Dept: ORTHOPEDIC SURGERY | Facility: CLINIC | Age: 64
End: 2024-07-15

## 2024-07-15 VITALS — WEIGHT: 133 LBS | BODY MASS INDEX: 21.89 KG/M2 | HEIGHT: 65.5 IN

## 2024-07-15 DIAGNOSIS — Z00.00 ENCOUNTER FOR GENERAL ADULT MEDICAL EXAMINATION W/OUT ABNORMAL FINDINGS: ICD-10-CM

## 2024-07-15 DIAGNOSIS — M17.12 UNILATERAL PRIMARY OSTEOARTHRITIS, LEFT KNEE: ICD-10-CM

## 2024-07-15 DIAGNOSIS — Z82.61 FAMILY HISTORY OF ARTHRITIS: ICD-10-CM

## 2024-07-15 DIAGNOSIS — Z86.39 PERSONAL HISTORY OF OTHER ENDOCRINE, NUTRITIONAL AND METABOLIC DISEASE: ICD-10-CM

## 2024-07-15 DIAGNOSIS — Z78.9 OTHER SPECIFIED HEALTH STATUS: ICD-10-CM

## 2024-07-15 PROCEDURE — 99204 OFFICE O/P NEW MOD 45 MIN: CPT

## 2024-07-15 PROCEDURE — 73564 X-RAY EXAM KNEE 4 OR MORE: CPT | Mod: LT

## 2024-07-15 RX ORDER — METFORMIN HYDROCHLORIDE 625 MG/1
TABLET ORAL
Refills: 0 | Status: ACTIVE | COMMUNITY

## 2024-07-15 RX ORDER — HYALURONATE SODIUM 20 MG/2 ML
20 SYRINGE (ML) INTRAARTICULAR
Qty: 3 | Refills: 0 | Status: ACTIVE | COMMUNITY
Start: 2024-07-15 | End: 1900-01-01

## 2024-07-15 RX ORDER — LEVOTHYROXINE SODIUM 137 UG/1
TABLET ORAL
Refills: 0 | Status: ACTIVE | COMMUNITY

## 2024-08-05 ENCOUNTER — APPOINTMENT (OUTPATIENT)
Dept: ORTHOPEDIC SURGERY | Facility: CLINIC | Age: 64
End: 2024-08-05

## 2024-08-05 PROCEDURE — 20610 DRAIN/INJ JOINT/BURSA W/O US: CPT | Mod: LT

## 2024-08-05 NOTE — PROCEDURE
[FreeTextEntry3] : A Large joint injection was performed of the left knee. An injection of Euflexxa, series #1 was used.  The indication for this procedure was pain and x-ray evidence of Osteoarthritis on this or prior visit, and patient had decreased mobility in the joint. Risks, benefits and alternatives to procedure were discussed; Risks outlined include but are not limited to infection, sepsis, bleeding, scarring, skin discoloration, temporary increase in pain, syncopal episode, failure to resolve symptoms, allergic reaction, and symptom recurrence. All questions were answered to the patient's apparent satisfaction and informed consent obtained. The area of injection was prepared in a sterile fashion. Prior to injection a 'Time Out' was conducted in accordance with [place] policy and the site and nature of procedure verified with the patient.    Procedure:  The injection and aspiration was carried out utilizing sterile technique. The site was prepped with alcohol, betadine, ethyl chloride sprayed topically and sterile technique used.   ( X ) 2cc of Euflexxa    Patient tolerated the procedure well and direct pressure was applied for hemostasis. The patient was reminded of potential post-injection risks including, but not limited to, delayed hypersensitivity reactions and/or infection. Ice tonight to the injection site.

## 2024-08-05 NOTE — ASSESSMENT
[FreeTextEntry1] : Underlying pathology reviewed and treatment options discussed. 07/15/2024: LT knee x-rays, 4 views, revealed mild OA. We discussed the findings of arthritis and the potential treatment options including CSI, visco injections, and PT. Pt is interested in gel inj.  We discussed risk and benefits. Will obtain auth for gel inj.  Follow up with auth.  Activity modification as tolerated. Questions addressed.  08/05/2024: L knee Euflexxa #1 - tolerated well.  Ice if sore. RTO in 1 week to continue.   Progress note completed by KAHLIL Patino under the direct supervision of Arya Roberson M.D.

## 2024-08-05 NOTE — HISTORY OF PRESENT ILLNESS
[Result of repetitive motion] : result of repetitive motion [8] : 8 [Localized] : localized [Intermittent] : intermittent [Leisure] : leisure [Social interactions] : social interactions [Rest] : rest [Sitting] : sitting [Stairs] : stairs [Full time] : Work status: full time [3] : 3 [Exercising] : exercising [de-identified] : 08/05/2024: Here for L knee Euflexxa #1.  Symptoms continue.  7/15/24: 63yo F with left knee pain for the past 2+ months with no injury. She states pain is mild, and most bothersome with stairs. Pain is intermittent. No formal tx to date. She notes her knee has given out "3 times" over the past year.  [] : no [FreeTextEntry1] : l knee [FreeTextEntry5] : pain for 2 months, walking stairs and now pain is getting worse [de-identified] : getting up from sitting  [de-identified] :

## 2024-08-12 ENCOUNTER — APPOINTMENT (OUTPATIENT)
Dept: ORTHOPEDIC SURGERY | Facility: CLINIC | Age: 64
End: 2024-08-12
Payer: COMMERCIAL

## 2024-08-12 ENCOUNTER — APPOINTMENT (OUTPATIENT)
Dept: ORTHOPEDIC SURGERY | Facility: CLINIC | Age: 64
End: 2024-08-12

## 2024-08-12 VITALS — HEIGHT: 65.5 IN | BODY MASS INDEX: 21.89 KG/M2 | WEIGHT: 133 LBS

## 2024-08-12 PROCEDURE — 20610 DRAIN/INJ JOINT/BURSA W/O US: CPT | Mod: LT

## 2024-08-12 NOTE — PROCEDURE
[FreeTextEntry3] : Viscosupplementation Injection: X-ray evidence of osteoarthritis on this or prior visit and patient has tried OTC's including aspirin, Ibuprofen, Aleve etc or prescription NSAIDS, and/or exercises at home and/ or physical therapy without satisfactory response.   An injection of Euflexxa 2.5ml #2 was injected into the left knee(s) after verbal consent obtained.   Patient has tried OTC's including aspirin, Ibuprofen, Aleve etc or prescription NSAIDS, and/or exercises at home and/ or physical therapy without satisfactory response and Patient has decreased mobility in the joint. The risks, benefits, and alternatives to cortisone injection were explained in full to the patient. Risks outlined include but are not limited to infection, sepsis, bleeding, scarring, skin discoloration, temporary increase in pain, syncopal episode, failure to resolve symptoms, allergic reaction, and symptom recurrence. Patient understands the risks. All questions were answered. After discussion of options, patient requested an injection. Oral informed consent was obtained. Sterile technique was utilized for the procedure including the preparation of the solutions used for the injection. Injection site was prepped with betadine and alcohol. Ethyl chloride sprayed topically. Sterile technique used. Patient tolerated procedure well.   Post Procedure Instructions: Patient was advised to call if redness, pain, or fever occur. Apply ice for 15 min. every 2 hours for the next 12-24 hours as tolerated. Patient was advised to rest the joint(s) for 1-2 days.

## 2024-08-12 NOTE — ASSESSMENT
[FreeTextEntry1] : Underlying pathology reviewed and treatment options discussed. 07/15/2024: LT knee x-rays, 4 views, revealed mild OA. We discussed the findings of arthritis and the potential treatment options including CSI, visco injections, and PT. Pt is interested in gel inj.  We discussed risk and benefits. Will obtain auth for gel inj.  Follow up with auth.  Activity modification as tolerated. Questions addressed.  08/05/2024: L knee Euflexxa #1 - tolerated well.  Ice if sore. RTO in 1 week to continue.   08/12/2024: L knee Euflexxa #2 tolerated well.  Ice if sore. RTO in 1 week to continue.

## 2024-08-12 NOTE — HISTORY OF PRESENT ILLNESS
[Result of repetitive motion] : result of repetitive motion [3] : 3 [Localized] : localized [Intermittent] : intermittent [Leisure] : leisure [Social interactions] : social interactions [Rest] : rest [Sitting] : sitting [Exercising] : exercising [Stairs] : stairs [Full time] : Work status: full time [2] : 2 [Euflexxa] : Euflexxa [4] : 4 [Burning] : burning [Injection therapy] : injection therapy [de-identified] : 08/12/2024: Left knee Euflexxa #2. Symptoms continue. Denies complication with prior injection.  08/05/2024: Here for L knee Euflexxa #1.  Symptoms continue.  7/15/24: 65yo F with left knee pain for the past 2+ months with no injury. She states pain is mild, and most bothersome with stairs. Pain is intermittent. No formal tx to date. She notes her knee has given out "3 times" over the past year.  [] : no [FreeTextEntry5] : pain for 2 months, walking stairs and now pain is getting worse [FreeTextEntry1] : l knee [de-identified] : squats [de-identified] :  [de-identified] : 8-5-24 [de-identified] : l knee

## 2024-08-13 ENCOUNTER — OFFICE (OUTPATIENT)
Dept: URBAN - METROPOLITAN AREA CLINIC 12 | Facility: CLINIC | Age: 64
Setting detail: OPHTHALMOLOGY
End: 2024-08-13
Payer: COMMERCIAL

## 2024-08-13 DIAGNOSIS — H25.13: ICD-10-CM

## 2024-08-13 DIAGNOSIS — H25.12: ICD-10-CM

## 2024-08-13 PROCEDURE — 92136 OPHTHALMIC BIOMETRY: CPT | Performed by: OPHTHALMOLOGY

## 2024-08-13 PROCEDURE — 99213 OFFICE O/P EST LOW 20 MIN: CPT | Performed by: OPHTHALMOLOGY

## 2024-08-13 ASSESSMENT — CONFRONTATIONAL VISUAL FIELD TEST (CVF)
OS_FINDINGS: FULL
OD_FINDINGS: FULL

## 2024-08-19 ENCOUNTER — APPOINTMENT (OUTPATIENT)
Dept: ORTHOPEDIC SURGERY | Facility: CLINIC | Age: 64
End: 2024-08-19

## 2024-08-19 ENCOUNTER — APPOINTMENT (OUTPATIENT)
Dept: ORTHOPEDIC SURGERY | Facility: CLINIC | Age: 64
End: 2024-08-19
Payer: COMMERCIAL

## 2024-08-19 VITALS — WEIGHT: 133 LBS | HEIGHT: 65.5 IN | BODY MASS INDEX: 21.89 KG/M2

## 2024-08-19 DIAGNOSIS — M17.12 UNILATERAL PRIMARY OSTEOARTHRITIS, LEFT KNEE: ICD-10-CM

## 2024-08-19 PROCEDURE — 20610 DRAIN/INJ JOINT/BURSA W/O US: CPT | Mod: LT

## 2024-08-19 NOTE — ASSESSMENT
[FreeTextEntry1] : Underlying pathology reviewed and treatment options discussed. 07/15/2024: LT knee x-rays, 4 views, revealed mild OA. We discussed the findings of arthritis and the potential treatment options including CSI, visco injections, and PT. Pt is interested in gel inj.  We discussed risk and benefits. Will obtain auth for gel inj.  Follow up with auth.  Activity modification as tolerated. Questions addressed.  08/05/2024: L knee Euflexxa #1 - tolerated well.  Ice if sore. RTO in 1 week to continue.   08/12/2024: L knee Euflexxa #2 tolerated well.  Ice if sore. RTO in 1 week to continue.   08/19/2024:L knee Euflexxa #3 tolerated well.  Ice if sore. RTO in 6 weeks if symptoms persist.

## 2024-08-19 NOTE — HISTORY OF PRESENT ILLNESS
[Result of repetitive motion] : result of repetitive motion [Localized] : localized [Intermittent] : intermittent [Leisure] : leisure [Social interactions] : social interactions [Rest] : rest [Sitting] : sitting [Injection therapy] : injection therapy [Exercising] : exercising [Stairs] : stairs [Full time] : Work status: full time [Euflexxa] : Euflexxa [3] : 3 [de-identified] : 08/19/2024: Left knee Euflexxa #3. Symptoms continue. Denies complication with prior injection.  08/12/2024: Left knee Euflexxa #2. Symptoms continue. Denies complication with prior injection.  08/05/2024: Here for L knee Euflexxa #1.  Symptoms continue.  7/15/24: 65yo F with left knee pain for the past 2+ months with no injury. She states pain is mild, and most bothersome with stairs. Pain is intermittent. No formal tx to date. She notes her knee has given out "3 times" over the past year.  [] : no [FreeTextEntry1] : l knee [FreeTextEntry5] : pain for 2 months, walking stairs and now pain is getting worse [de-identified] : squats [de-identified] :  [de-identified] : 8-12-24 [de-identified] : l knee

## 2024-08-26 ENCOUNTER — ASC (OUTPATIENT)
Dept: URBAN - METROPOLITAN AREA SURGERY 8 | Facility: SURGERY | Age: 64
Setting detail: OPHTHALMOLOGY
End: 2024-08-26
Payer: COMMERCIAL

## 2024-08-26 DIAGNOSIS — H25.12: ICD-10-CM

## 2024-08-26 PROCEDURE — 66984 XCAPSL CTRC RMVL W/O ECP: CPT | Mod: LT | Performed by: OPHTHALMOLOGY

## 2024-08-27 ENCOUNTER — RX ONLY (RX ONLY)
Age: 64
End: 2024-08-27

## 2024-08-27 ENCOUNTER — OFFICE (OUTPATIENT)
Dept: URBAN - METROPOLITAN AREA CLINIC 12 | Facility: CLINIC | Age: 64
Setting detail: OPHTHALMOLOGY
End: 2024-08-27
Payer: COMMERCIAL

## 2024-08-27 DIAGNOSIS — H25.11: ICD-10-CM

## 2024-08-27 PROBLEM — Z96.1: Status: ACTIVE | Noted: 2024-08-27

## 2024-08-27 PROBLEM — H04.123 DES- DRY EYES; BOTH EYES: Status: ACTIVE | Noted: 2024-08-13

## 2024-08-27 PROCEDURE — 92136 OPHTHALMIC BIOMETRY: CPT | Performed by: OPHTHALMOLOGY

## 2024-08-27 ASSESSMENT — CONFRONTATIONAL VISUAL FIELD TEST (CVF)
OS_FINDINGS: FULL
OD_FINDINGS: FULL

## 2024-09-04 ENCOUNTER — APPOINTMENT (OUTPATIENT)
Dept: INTERNAL MEDICINE | Facility: CLINIC | Age: 64
End: 2024-09-04
Payer: COMMERCIAL

## 2024-09-04 ENCOUNTER — APPOINTMENT (OUTPATIENT)
Dept: PULMONOLOGY | Facility: CLINIC | Age: 64
End: 2024-09-04
Payer: COMMERCIAL

## 2024-09-04 VITALS
HEIGHT: 65.5 IN | DIASTOLIC BLOOD PRESSURE: 84 MMHG | SYSTOLIC BLOOD PRESSURE: 128 MMHG | HEART RATE: 60 BPM | RESPIRATION RATE: 18 BRPM | TEMPERATURE: 97.4 F | WEIGHT: 132 LBS | BODY MASS INDEX: 21.73 KG/M2 | OXYGEN SATURATION: 98 %

## 2024-09-04 DIAGNOSIS — J44.9 CHRONIC OBSTRUCTIVE PULMONARY DISEASE, UNSPECIFIED: ICD-10-CM

## 2024-09-04 DIAGNOSIS — R91.1 SOLITARY PULMONARY NODULE: ICD-10-CM

## 2024-09-04 DIAGNOSIS — Z72.0 TOBACCO USE: ICD-10-CM

## 2024-09-04 DIAGNOSIS — J44.89 OTHER SPECIFIED CHRONIC OBSTRUCTIVE PULMONARY DISEASE: ICD-10-CM

## 2024-09-04 PROCEDURE — ZZZZZ: CPT

## 2024-09-04 PROCEDURE — 94010 BREATHING CAPACITY TEST: CPT

## 2024-09-04 PROCEDURE — G0296 VISIT TO DETERM LDCT ELIG: CPT

## 2024-09-04 PROCEDURE — 94729 DIFFUSING CAPACITY: CPT

## 2024-09-04 PROCEDURE — 99204 OFFICE O/P NEW MOD 45 MIN: CPT | Mod: 25

## 2024-09-04 PROCEDURE — 94727 GAS DIL/WSHOT DETER LNG VOL: CPT

## 2024-09-04 RX ORDER — UMECLIDINIUM BROMIDE AND VILANTEROL TRIFENATATE 62.5; 25 UG/1; UG/1
62.5-25 POWDER RESPIRATORY (INHALATION) DAILY
Qty: 2 | Refills: 0 | Status: ACTIVE | COMMUNITY
Start: 2024-09-04 | End: 1900-01-01

## 2024-09-04 RX ORDER — LOSARTAN POTASSIUM 25 MG/1
25 TABLET, FILM COATED ORAL
Refills: 0 | Status: ACTIVE | COMMUNITY

## 2024-09-05 PROBLEM — J44.89 COPD WITH CHRONIC BRONCHITIS: Status: ACTIVE | Noted: 2024-09-05

## 2024-09-05 PROBLEM — R91.1 PULMONARY LESION: Status: ACTIVE | Noted: 2024-09-05

## 2024-09-05 PROBLEM — J44.9 COPD (CHRONIC OBSTRUCTIVE PULMONARY DISEASE): Status: ACTIVE | Noted: 2024-09-04

## 2024-09-05 NOTE — PROCEDURE
[FreeTextEntry1] : Pulmonary function test show airway obstruction no significant bronchodilator response Mild obstructive defect moderate reduction in DLCO. CT scan images from [facility]

## 2024-09-05 NOTE — COUNSELING
[Sleep ___ hours/day] : Sleep [unfilled] hours/day [ - Annual Lung Cancer Screening/Share Decision Making Discussion] : Annual Lung Cancer Screening/Share Decision Making Discussion. (I have advised this patient to have a Low Dose CT (LDCT) scan of the lungs and have discussed the following with the patient in a shared decision making discussion:   Benefits of Detection and Early Treatment: There is adequate evidence that annual screening for lung cancer with LDCT in a population of high-risk persons can prevent a substantial number of lung cancer-related deaths. The magnitude of benefit depends on the individual patient's risk for lung cancer, as those who are at highest risk are most likely to benefit. Screening cannot prevent most lung cancer-related deaths, and does not replace smoking cessation. Harms of Detection and Early Intervention and Treatment: The harms associated with LDCT screening include false-negative and false-positive results, incidental findings, over diagnosis, and radiation exposure. False-positive LDCT results occur in a substantial proportion of screened persons; 95% of all positive results do not lead to a diagnosis of cancer. In a high-quality screening program, further imaging can resolve most false-positive results; however, some patients may require invasive procedures. Radiation harms, including cancer resulting from cumulative exposure to radiation, vary depending on the age at the start of screening; the number of scans received; and the person's exposure to other sources of radiation, particularly other medical imaging.)

## 2024-09-05 NOTE — ASSESSMENT
[FreeTextEntry1] : 64 years old female former smoker quit 13 years back more than 20 pack years came to establish care for incidental bilateral small subcentimeter pulmonary nodule seen on a coronary CT scan. CT scan also shows emphysematous changes. Pulmonary function test is suggestive of mild obstruction without bronchodilator response and moderate reduction in DLCO. She is not on inhaled medications  Problems. New diagnosis of COPD Bilateral small subcentimeter lung nodules Former smoker  Recommendations. Steps of treatment of COPD discussed with patient Role of long-acting maintenance medications for COPD discussed. Will start Anoro Ellipta. Symptoms of COPD exacerbation discussed Congratulated on smoking cessation Continue to stay fit Send alpha-1 antitrypsin level  To follow-up lung nodules. I discussed about enrollment in lung cancer screening program versus routine CT scan for follow-up Patient agreed for lung cancer screening program, risks and benefits of the program discussed with patient She agreed to follow-up Next CT scan in 6 months

## 2024-09-05 NOTE — HISTORY OF PRESENT ILLNESS
[Former] : former [>= 20 pack years] : >= 20 pack years [Never] : never [TextBox_4] : 64 years old female with former smoker with incidental lung nodules on the coronary CT came to establish care Patient has quit smoking 13 years back. No prior diagnosis of COPD asthma interstitial lung disease. Her CT scan does show emphysematous changes. She is not on inhaled medications she is asymptomatic denies chronic productive cough hemoptysis fever or chills She has got chronic arthritis and takes steroids injection in the knees No prior history of lung cancer No unusual weight loss no chest pain no hemoptysis No recent use of oxygenation No history of sleep apnea Denies history of DVT and PE No occupational exposure to respiratory inhalants

## 2024-09-05 NOTE — PHYSICAL EXAM
[No Acute Distress] : no acute distress [Well Nourished] : well nourished [Well Developed] : well developed [Normal Oropharynx] : normal oropharynx [Normal Appearance] : normal appearance [No Neck Mass] : no neck mass [Normal Rate/Rhythm] : normal rate/rhythm [Normal S1, S2] : normal s1, s2 [No Murmurs] : no murmurs [No Resp Distress] : no resp distress [Clear to Auscultation Bilaterally] : clear to auscultation bilaterally [No Abnormalities] : no abnormalities [Benign] : benign [Normal Gait] : normal gait [No Clubbing] : no clubbing [No Cyanosis] : no cyanosis [No Edema] : no edema [FROM] : FROM [Normal Color/ Pigmentation] : normal color/ pigmentation [No Focal Deficits] : no focal deficits [Oriented x3] : oriented x3 [Normal Affect] : normal affect

## 2024-09-05 NOTE — CONSULT LETTER
[Dear  ___] : Dear  [unfilled], [Consult Letter:] : I had the pleasure of evaluating your patient, [unfilled]. [Please see my note below.] : Please see my note below. [Consult Closing:] : Thank you very much for allowing me to participate in the care of this patient.  If you have any questions, please do not hesitate to contact me. [Sincerely,] : Sincerely, [FreeTextEntry1] : Patient has some emphysematous changes on CT scan. Given her some airflow limitation on pulmonary function test I will start her on Anoro Ellipta [FreeTextEntry3] : rogelio

## 2024-09-09 ENCOUNTER — ASC (OUTPATIENT)
Dept: URBAN - METROPOLITAN AREA SURGERY 8 | Facility: SURGERY | Age: 64
Setting detail: OPHTHALMOLOGY
End: 2024-09-09
Payer: COMMERCIAL

## 2024-09-09 DIAGNOSIS — H25.11: ICD-10-CM

## 2024-09-09 PROCEDURE — 66984 XCAPSL CTRC RMVL W/O ECP: CPT | Mod: 79,RT | Performed by: OPHTHALMOLOGY

## 2024-09-10 ENCOUNTER — OFFICE (OUTPATIENT)
Dept: URBAN - METROPOLITAN AREA CLINIC 12 | Facility: CLINIC | Age: 64
Setting detail: OPHTHALMOLOGY
End: 2024-09-10
Payer: COMMERCIAL

## 2024-09-10 DIAGNOSIS — Z96.1: ICD-10-CM

## 2024-09-10 PROCEDURE — 99024 POSTOP FOLLOW-UP VISIT: CPT | Performed by: OPTOMETRIST

## 2024-09-10 ASSESSMENT — CONFRONTATIONAL VISUAL FIELD TEST (CVF)
OS_FINDINGS: FULL
OD_FINDINGS: FULL

## 2024-09-16 ENCOUNTER — OFFICE (OUTPATIENT)
Dept: URBAN - METROPOLITAN AREA CLINIC 12 | Facility: CLINIC | Age: 64
Setting detail: OPHTHALMOLOGY
End: 2024-09-16
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
## 2024-09-16 PROCEDURE — NO SHOW FE NO SHOW FEE: Performed by: OPTOMETRIST

## 2024-09-17 ENCOUNTER — OFFICE (OUTPATIENT)
Dept: URBAN - METROPOLITAN AREA CLINIC 12 | Facility: CLINIC | Age: 64
Setting detail: OPHTHALMOLOGY
End: 2024-09-17
Payer: COMMERCIAL

## 2024-09-17 DIAGNOSIS — Z96.1: ICD-10-CM

## 2024-09-17 PROCEDURE — 99024 POSTOP FOLLOW-UP VISIT: CPT | Performed by: OPTOMETRIST

## 2024-09-17 ASSESSMENT — CONFRONTATIONAL VISUAL FIELD TEST (CVF)
OS_FINDINGS: FULL
OD_FINDINGS: FULL

## 2024-10-15 ENCOUNTER — OFFICE (OUTPATIENT)
Dept: URBAN - METROPOLITAN AREA CLINIC 12 | Facility: CLINIC | Age: 64
Setting detail: OPHTHALMOLOGY
End: 2024-10-15
Payer: COMMERCIAL

## 2024-10-15 DIAGNOSIS — Z96.1: ICD-10-CM

## 2024-10-15 PROCEDURE — 99024 POSTOP FOLLOW-UP VISIT: CPT | Performed by: OPHTHALMOLOGY

## 2024-10-15 ASSESSMENT — REFRACTION_CURRENTRX
OS_OVR_VA: 20/
OS_SPHERE: +2.75
OD_OVR_VA: 20/
OD_SPHERE: +2.75

## 2024-10-15 ASSESSMENT — REFRACTION_AUTOREFRACTION
OS_CYLINDER: -0.50
OD_SPHERE: +1.00
OD_CYLINDER: -1.00
OS_AXIS: 070
OS_SPHERE: +1.00
OD_AXIS: 121

## 2024-10-15 ASSESSMENT — REFRACTION_MANIFEST
OD_AXIS: 062
OD_SPHERE: +1.25
OS_VA1: 20/20-3
OS_CYLINDER: -0.25
OD_CYLINDER: -0.25
OD_AXIS: 114
OD_SPHERE: +1.25
OD_CYLINDER: -1.00
OS_SPHERE: +1.25
OS_AXIS: 014
OD_VA1: 20/20-
OD_VA1: 20/20-3

## 2024-10-15 ASSESSMENT — KERATOMETRY
OS_K2POWER_DIOPTERS: 43.75
OD_K2POWER_DIOPTERS: 43.75
OD_K1POWER_DIOPTERS: 43.00
METHOD_AUTO_MANUAL: AUTO
OS_K1POWER_DIOPTERS: 43.75
OD_AXISANGLE_DEGREES: 048
OS_AXISANGLE_DEGREES: 090

## 2024-10-15 ASSESSMENT — VISUAL ACUITY
OS_BCVA: 20/30+2
OD_BCVA: 20/25-2

## 2024-10-15 ASSESSMENT — CONFRONTATIONAL VISUAL FIELD TEST (CVF)
OD_FINDINGS: FULL
OS_FINDINGS: FULL

## 2024-10-15 ASSESSMENT — TONOMETRY
OS_IOP_MMHG: 10
OD_IOP_MMHG: 10

## 2024-10-15 ASSESSMENT — SUPERFICIAL PUNCTATE KERATITIS (SPK)
OD_SPK: 1+
OS_SPK: 1+

## 2024-10-22 NOTE — ED CDU PROVIDER SUBSEQUENT DAY NOTE - TOBACCO USE
[facility]: Office Visit Progress Note    Chief Complaint:    Metastatic gastric carcinoma    History of Present Illness:  Referral Diagnosis: Gastric mass     Referring Provider: Joaquina Duarte APRN - NP     Primary Care Provider: N/A     Family History of Cancer/ Hematology Disorders: No known family history     Presenting Symptoms: Abnormal CT scan of Abdomen/Pelvis     Chronological History of Pertinent Events:     58 y.o.  male     **PATIENT NEEDS A **     10/14/23 - Patient went to ED - (EPIC)  c/o right lower abdominal pain x 1 week  Patient was informed by the [facility] to come to ED related to abnormal lab results.   Pain is 5/10 and is constant.   Losing appetite and feels like food isn't going down. Dizzy. Constipation.  CBC shows slight slight leukocytosis with a WBC of 11.5.    CMP shows an elevated alcohol [place] at 149 but is otherwise unremarkable.    Urinalysis shows no signs of infection and lipase is within normal limits.    Patient is stable as he states that he is still able to eat and drink despite some discomfort and he has not been vomiting everything back up.    Discussed the importance of returning immediately to the emergency department should his symptoms change or worsen.   Will treat the patient with pain medication for here and at home for his newfound possible malignancy.    Stressed the importance of following up with both gastroenterology and oncology.  Management of this patient was discussed with an external consultant.  Referrals placed with Oncology, GI and Urology.  Rx: Alvarado provided.     10/14/23 - Abnormal labs (EPIC)  Albumin - 3.2  Globulin - 5.0  Alk Phos - 149  WBC - 11.5  RBC - 4.10  Hgb - 11.5  Hct - 35.5  Neutrophils Abs - 8.3  Specific Gravity, Urine - 1.025     10/14/23 -  CT ABDOMEN PELVIS W IV CONTRAST revealing: (EPIC)  IMPRESSION:  No suggestion of acute intra-abdominal abnormality.  Appearance of a solid 
Former smoker

## 2024-11-05 ENCOUNTER — APPOINTMENT (OUTPATIENT)
Dept: INTERNAL MEDICINE | Facility: CLINIC | Age: 64
End: 2024-11-05
Payer: COMMERCIAL

## 2024-11-05 VITALS
HEIGHT: 66 IN | OXYGEN SATURATION: 97 % | SYSTOLIC BLOOD PRESSURE: 139 MMHG | DIASTOLIC BLOOD PRESSURE: 88 MMHG | WEIGHT: 133 LBS | RESPIRATION RATE: 16 BRPM | TEMPERATURE: 97.9 F | BODY MASS INDEX: 21.38 KG/M2 | HEART RATE: 67 BPM

## 2024-11-05 DIAGNOSIS — E03.9 HYPOTHYROIDISM, UNSPECIFIED: ICD-10-CM

## 2024-11-05 DIAGNOSIS — Z87.891 PERSONAL HISTORY OF NICOTINE DEPENDENCE: ICD-10-CM

## 2024-11-05 DIAGNOSIS — I10 ESSENTIAL (PRIMARY) HYPERTENSION: ICD-10-CM

## 2024-11-05 DIAGNOSIS — R91.8 OTHER NONSPECIFIC ABNORMAL FINDING OF LUNG FIELD: ICD-10-CM

## 2024-11-05 DIAGNOSIS — E11.9 TYPE 2 DIABETES MELLITUS W/OUT COMPLICATIONS: ICD-10-CM

## 2024-11-05 PROCEDURE — 94060 EVALUATION OF WHEEZING: CPT

## 2024-11-05 PROCEDURE — 94618 PULMONARY STRESS TESTING: CPT

## 2024-11-05 PROCEDURE — 99204 OFFICE O/P NEW MOD 45 MIN: CPT | Mod: 25

## 2024-11-05 RX ORDER — ALBUTEROL SULFATE 90 UG/1
108 (90 BASE) INHALANT RESPIRATORY (INHALATION)
Qty: 1 | Refills: 2 | Status: ACTIVE | COMMUNITY
Start: 2024-11-05 | End: 1900-01-01

## 2024-11-05 RX ORDER — FLUTICASONE FUROATE, UMECLIDINIUM BROMIDE AND VILANTEROL TRIFENATATE 200; 62.5; 25 UG/1; UG/1; UG/1
200-62.5-25 POWDER RESPIRATORY (INHALATION)
Qty: 3 | Refills: 3 | Status: ACTIVE | COMMUNITY
Start: 2024-11-05

## 2024-11-11 ENCOUNTER — APPOINTMENT (OUTPATIENT)
Dept: INTERNAL MEDICINE | Facility: CLINIC | Age: 64
End: 2024-11-11
Payer: COMMERCIAL

## 2024-11-11 ENCOUNTER — NON-APPOINTMENT (OUTPATIENT)
Age: 64
End: 2024-11-11

## 2024-11-11 DIAGNOSIS — J44.9 CHRONIC OBSTRUCTIVE PULMONARY DISEASE, UNSPECIFIED: ICD-10-CM

## 2024-11-11 DIAGNOSIS — R06.09 OTHER FORMS OF DYSPNEA: ICD-10-CM

## 2024-11-11 PROCEDURE — 94618 PULMONARY STRESS TESTING: CPT

## 2024-11-21 ENCOUNTER — APPOINTMENT (OUTPATIENT)
Dept: INTERNAL MEDICINE | Facility: CLINIC | Age: 64
End: 2024-11-21

## 2024-12-16 ENCOUNTER — APPOINTMENT (OUTPATIENT)
Dept: ORTHOPEDIC SURGERY | Facility: CLINIC | Age: 64
End: 2024-12-16

## 2024-12-30 ENCOUNTER — APPOINTMENT (OUTPATIENT)
Dept: ORTHOPEDIC SURGERY | Facility: CLINIC | Age: 64
End: 2024-12-30
Payer: COMMERCIAL

## 2024-12-30 VITALS — HEIGHT: 66 IN | BODY MASS INDEX: 21.38 KG/M2 | WEIGHT: 133 LBS

## 2024-12-30 DIAGNOSIS — M25.811 OTHER SPECIFIED JOINT DISORDERS, RIGHT SHOULDER: ICD-10-CM

## 2024-12-30 DIAGNOSIS — M75.52 BURSITIS OF LEFT SHOULDER: ICD-10-CM

## 2024-12-30 DIAGNOSIS — M19.011 PRIMARY OSTEOARTHRITIS, RIGHT SHOULDER: ICD-10-CM

## 2024-12-30 DIAGNOSIS — M25.511 PAIN IN RIGHT SHOULDER: ICD-10-CM

## 2024-12-30 DIAGNOSIS — M25.512 PAIN IN LEFT SHOULDER: ICD-10-CM

## 2024-12-30 PROCEDURE — 20611 DRAIN/INJ JOINT/BURSA W/US: CPT | Mod: 50

## 2024-12-30 PROCEDURE — 99214 OFFICE O/P EST MOD 30 MIN: CPT | Mod: 25,57

## 2025-02-14 ENCOUNTER — APPOINTMENT (OUTPATIENT)
Dept: INTERNAL MEDICINE | Facility: CLINIC | Age: 65
End: 2025-02-14

## 2025-02-25 ENCOUNTER — APPOINTMENT (OUTPATIENT)
Dept: INTERNAL MEDICINE | Facility: CLINIC | Age: 65
End: 2025-02-25
Payer: COMMERCIAL

## 2025-02-25 VITALS
WEIGHT: 129 LBS | BODY MASS INDEX: 20.73 KG/M2 | TEMPERATURE: 98.3 F | SYSTOLIC BLOOD PRESSURE: 122 MMHG | DIASTOLIC BLOOD PRESSURE: 72 MMHG | OXYGEN SATURATION: 97 % | HEART RATE: 72 BPM | HEIGHT: 66 IN | RESPIRATION RATE: 16 BRPM

## 2025-02-25 DIAGNOSIS — J44.9 CHRONIC OBSTRUCTIVE PULMONARY DISEASE, UNSPECIFIED: ICD-10-CM

## 2025-02-25 DIAGNOSIS — J44.89 OTHER SPECIFIED CHRONIC OBSTRUCTIVE PULMONARY DISEASE: ICD-10-CM

## 2025-02-25 PROCEDURE — 99215 OFFICE O/P EST HI 40 MIN: CPT | Mod: 25

## 2025-02-25 PROCEDURE — 94060 EVALUATION OF WHEEZING: CPT

## 2025-02-25 RX ORDER — INHALER, ASSIST DEVICES
SPACER (EA) MISCELLANEOUS
Qty: 1 | Refills: 3 | Status: ACTIVE | COMMUNITY
Start: 2025-02-25 | End: 1900-01-01

## 2025-02-25 RX ORDER — BUDESONIDE, GLYCOPYRROLATE, AND FORMOTEROL FUMARATE 160; 9; 4.8 UG/1; UG/1; UG/1
160-9-4.8 AEROSOL, METERED RESPIRATORY (INHALATION)
Qty: 10.7 | Refills: 11 | Status: ACTIVE | COMMUNITY
Start: 2025-02-25 | End: 1900-01-01

## 2025-03-11 ENCOUNTER — APPOINTMENT (OUTPATIENT)
Dept: PULMONOLOGY | Facility: CLINIC | Age: 65
End: 2025-03-11

## 2025-03-24 ENCOUNTER — APPOINTMENT (OUTPATIENT)
Dept: ORTHOPEDIC SURGERY | Facility: CLINIC | Age: 65
End: 2025-03-24
Payer: COMMERCIAL

## 2025-03-24 DIAGNOSIS — M54.12 RADICULOPATHY, CERVICAL REGION: ICD-10-CM

## 2025-03-24 DIAGNOSIS — C20 MALIGNANT NEOPLASM OF RECTUM: ICD-10-CM

## 2025-03-24 DIAGNOSIS — I10 ESSENTIAL (PRIMARY) HYPERTENSION: ICD-10-CM

## 2025-03-24 DIAGNOSIS — C80.1 MALIGNANT (PRIMARY) NEOPLASM, UNSPECIFIED: ICD-10-CM

## 2025-03-24 DIAGNOSIS — M48.02 SPINAL STENOSIS, CERVICAL REGION: ICD-10-CM

## 2025-03-24 DIAGNOSIS — M25.811 OTHER SPECIFIED JOINT DISORDERS, RIGHT SHOULDER: ICD-10-CM

## 2025-03-24 PROCEDURE — 99213 OFFICE O/P EST LOW 20 MIN: CPT

## 2025-03-24 RX ORDER — METHYLPREDNISOLONE 4 MG/1
4 TABLET ORAL
Qty: 1 | Refills: 0 | Status: ACTIVE | COMMUNITY
Start: 2025-03-24 | End: 1900-01-01

## 2025-03-30 ENCOUNTER — INPATIENT (INPATIENT)
Facility: HOSPITAL | Age: 65
LOS: 4 days | Discharge: HOME | DRG: 871 | End: 2025-04-04
Attending: STUDENT IN AN ORGANIZED HEALTH CARE EDUCATION/TRAINING PROGRAM | Admitting: INTERNAL MEDICINE
Payer: COMMERCIAL

## 2025-03-30 VITALS
TEMPERATURE: 98 F | RESPIRATION RATE: 18 BRPM | SYSTOLIC BLOOD PRESSURE: 114 MMHG | OXYGEN SATURATION: 96 % | DIASTOLIC BLOOD PRESSURE: 75 MMHG | WEIGHT: 129.19 LBS | HEART RATE: 86 BPM

## 2025-03-30 DIAGNOSIS — R50.9 FEVER, UNSPECIFIED: ICD-10-CM

## 2025-03-30 DIAGNOSIS — Z96.641 PRESENCE OF RIGHT ARTIFICIAL HIP JOINT: Chronic | ICD-10-CM

## 2025-03-30 LAB
ALBUMIN SERPL ELPH-MCNC: 3 G/DL — LOW (ref 3.3–5)
ALP SERPL-CCNC: 56 U/L — SIGNIFICANT CHANGE UP (ref 40–120)
ALT FLD-CCNC: 42 U/L — SIGNIFICANT CHANGE UP (ref 12–78)
ANION GAP SERPL CALC-SCNC: 5 MMOL/L — SIGNIFICANT CHANGE UP (ref 5–17)
APPEARANCE UR: CLEAR — SIGNIFICANT CHANGE UP
APTT BLD: 31.7 SEC — SIGNIFICANT CHANGE UP (ref 24.5–35.6)
AST SERPL-CCNC: 31 U/L — SIGNIFICANT CHANGE UP (ref 15–37)
BACTERIA # UR AUTO: NEGATIVE /HPF — SIGNIFICANT CHANGE UP
BASOPHILS # BLD AUTO: 0.01 K/UL — SIGNIFICANT CHANGE UP (ref 0–0.2)
BASOPHILS NFR BLD AUTO: 0.4 % — SIGNIFICANT CHANGE UP (ref 0–2)
BILIRUB SERPL-MCNC: 0.4 MG/DL — SIGNIFICANT CHANGE UP (ref 0.2–1.2)
BILIRUB UR-MCNC: NEGATIVE — SIGNIFICANT CHANGE UP
BUN SERPL-MCNC: 8 MG/DL — SIGNIFICANT CHANGE UP (ref 7–23)
CALCIUM SERPL-MCNC: 8.9 MG/DL — SIGNIFICANT CHANGE UP (ref 8.5–10.1)
CAST: 0 /LPF — SIGNIFICANT CHANGE UP (ref 0–4)
CHLORIDE SERPL-SCNC: 98 MMOL/L — SIGNIFICANT CHANGE UP (ref 96–108)
CO2 SERPL-SCNC: 29 MMOL/L — SIGNIFICANT CHANGE UP (ref 22–31)
COLOR SPEC: YELLOW — SIGNIFICANT CHANGE UP
CREAT SERPL-MCNC: 0.62 MG/DL — SIGNIFICANT CHANGE UP (ref 0.5–1.3)
DIFF PNL FLD: ABNORMAL
EGFR: 99 ML/MIN/1.73M2 — SIGNIFICANT CHANGE UP
EGFR: 99 ML/MIN/1.73M2 — SIGNIFICANT CHANGE UP
EOSINOPHIL # BLD AUTO: 0.08 K/UL — SIGNIFICANT CHANGE UP (ref 0–0.5)
EOSINOPHIL NFR BLD AUTO: 3.3 % — SIGNIFICANT CHANGE UP (ref 0–6)
FLUAV AG NPH QL: SIGNIFICANT CHANGE UP
FLUBV AG NPH QL: SIGNIFICANT CHANGE UP
GLUCOSE SERPL-MCNC: 97 MG/DL — SIGNIFICANT CHANGE UP (ref 70–99)
GLUCOSE UR QL: NEGATIVE MG/DL — SIGNIFICANT CHANGE UP
HCT VFR BLD CALC: 34.6 % — SIGNIFICANT CHANGE UP (ref 34.5–45)
HGB BLD-MCNC: 11.2 G/DL — LOW (ref 11.5–15.5)
IMM GRANULOCYTES # BLD AUTO: 0.03 K/UL — SIGNIFICANT CHANGE UP (ref 0–0.07)
IMM GRANULOCYTES NFR BLD AUTO: 1.2 % — HIGH (ref 0–0.9)
INR BLD: 1.12 RATIO — SIGNIFICANT CHANGE UP (ref 0.85–1.16)
KETONES UR-MCNC: 15 MG/DL
LACTATE SERPL-SCNC: 0.9 MMOL/L — SIGNIFICANT CHANGE UP (ref 0.7–2)
LEUKOCYTE ESTERASE UR-ACNC: ABNORMAL
LYMPHOCYTES # BLD AUTO: 0.38 K/UL — LOW (ref 1–3.3)
LYMPHOCYTES NFR BLD AUTO: 15.7 % — SIGNIFICANT CHANGE UP (ref 13–44)
MANUAL SMEAR VERIFICATION: SIGNIFICANT CHANGE UP
MCHC RBC-ENTMCNC: 27.3 PG — SIGNIFICANT CHANGE UP (ref 27–34)
MCHC RBC-ENTMCNC: 32.4 G/DL — SIGNIFICANT CHANGE UP (ref 32–36)
MCV RBC AUTO: 84.4 FL — SIGNIFICANT CHANGE UP (ref 80–100)
MONOCYTES # BLD AUTO: 0.32 K/UL — SIGNIFICANT CHANGE UP (ref 0–0.9)
MONOCYTES NFR BLD AUTO: 13.2 % — SIGNIFICANT CHANGE UP (ref 2–14)
NEUTROPHILS # BLD AUTO: 1.6 K/UL — LOW (ref 1.8–7.4)
NEUTROPHILS NFR BLD AUTO: 66.2 % — SIGNIFICANT CHANGE UP (ref 43–77)
NITRITE UR-MCNC: NEGATIVE — SIGNIFICANT CHANGE UP
NRBC # BLD AUTO: 0 K/UL — SIGNIFICANT CHANGE UP (ref 0–0)
NRBC # FLD: 0 K/UL — SIGNIFICANT CHANGE UP (ref 0–0)
NRBC BLD AUTO-RTO: 0 /100 WBCS — SIGNIFICANT CHANGE UP (ref 0–0)
PH UR: 7 — SIGNIFICANT CHANGE UP (ref 5–8)
PLAT MORPH BLD: NORMAL — SIGNIFICANT CHANGE UP
PLATELET # BLD AUTO: 119 K/UL — LOW (ref 150–400)
PMV BLD: 9.1 FL — SIGNIFICANT CHANGE UP (ref 7–13)
POTASSIUM SERPL-MCNC: 3.8 MMOL/L — SIGNIFICANT CHANGE UP (ref 3.5–5.3)
POTASSIUM SERPL-SCNC: 3.8 MMOL/L — SIGNIFICANT CHANGE UP (ref 3.5–5.3)
PROT SERPL-MCNC: 6.3 GM/DL — SIGNIFICANT CHANGE UP (ref 6–8.3)
PROT UR-MCNC: NEGATIVE MG/DL — SIGNIFICANT CHANGE UP
PROTHROM AB SERPL-ACNC: 13.2 SEC — SIGNIFICANT CHANGE UP (ref 9.9–13.4)
RBC # BLD: 4.1 M/UL — SIGNIFICANT CHANGE UP (ref 3.8–5.2)
RBC # FLD: 14.4 % — SIGNIFICANT CHANGE UP (ref 10.3–14.5)
RBC BLD AUTO: NORMAL — SIGNIFICANT CHANGE UP
RBC CASTS # UR COMP ASSIST: 21 /HPF — HIGH (ref 0–4)
RSV RNA NPH QL NAA+NON-PROBE: SIGNIFICANT CHANGE UP
SARS-COV-2 RNA SPEC QL NAA+PROBE: SIGNIFICANT CHANGE UP
SODIUM SERPL-SCNC: 132 MMOL/L — LOW (ref 135–145)
SOURCE RESPIRATORY: SIGNIFICANT CHANGE UP
SP GR SPEC: 1.02 — SIGNIFICANT CHANGE UP (ref 1–1.03)
SQUAMOUS # UR AUTO: 0 /HPF — SIGNIFICANT CHANGE UP (ref 0–5)
UROBILINOGEN FLD QL: 0.2 MG/DL — SIGNIFICANT CHANGE UP (ref 0.2–1)
WBC # BLD: 2.42 K/UL — LOW (ref 3.8–10.5)
WBC # FLD AUTO: 2.42 K/UL — LOW (ref 3.8–10.5)
WBC MORPHOLOGY: NORMAL — SIGNIFICANT CHANGE UP
WBC UR QL: 1 /HPF — SIGNIFICANT CHANGE UP (ref 0–5)

## 2025-03-30 PROCEDURE — 80053 COMPREHEN METABOLIC PANEL: CPT

## 2025-03-30 PROCEDURE — 99223 1ST HOSP IP/OBS HIGH 75: CPT

## 2025-03-30 PROCEDURE — 87449 NOS EACH ORGANISM AG IA: CPT

## 2025-03-30 PROCEDURE — 71045 X-RAY EXAM CHEST 1 VIEW: CPT | Mod: 26

## 2025-03-30 PROCEDURE — 85730 THROMBOPLASTIN TIME PARTIAL: CPT

## 2025-03-30 PROCEDURE — 85027 COMPLETE CBC AUTOMATED: CPT

## 2025-03-30 PROCEDURE — 83036 HEMOGLOBIN GLYCOSYLATED A1C: CPT

## 2025-03-30 PROCEDURE — 99285 EMERGENCY DEPT VISIT HI MDM: CPT

## 2025-03-30 PROCEDURE — 82962 GLUCOSE BLOOD TEST: CPT

## 2025-03-30 PROCEDURE — 73030 X-RAY EXAM OF SHOULDER: CPT | Mod: RT

## 2025-03-30 PROCEDURE — 83735 ASSAY OF MAGNESIUM: CPT

## 2025-03-30 PROCEDURE — 74177 CT ABD & PELVIS W/CONTRAST: CPT | Mod: 26

## 2025-03-30 PROCEDURE — 36415 COLL VENOUS BLD VENIPUNCTURE: CPT

## 2025-03-30 PROCEDURE — 87324 CLOSTRIDIUM AG IA: CPT

## 2025-03-30 PROCEDURE — 93010 ELECTROCARDIOGRAM REPORT: CPT

## 2025-03-30 PROCEDURE — 87507 IADNA-DNA/RNA PROBE TQ 12-25: CPT

## 2025-03-30 PROCEDURE — 85025 COMPLETE CBC W/AUTO DIFF WBC: CPT

## 2025-03-30 PROCEDURE — 85610 PROTHROMBIN TIME: CPT

## 2025-03-30 PROCEDURE — 94640 AIRWAY INHALATION TREATMENT: CPT

## 2025-03-30 RX ORDER — PIPERACILLIN-TAZO-DEXTROSE,ISO 3.375G/5
3.38 IV SOLUTION, PIGGYBACK PREMIX FROZEN(ML) INTRAVENOUS EVERY 8 HOURS
Refills: 0 | Status: DISCONTINUED | OUTPATIENT
Start: 2025-03-30 | End: 2025-04-04

## 2025-03-30 RX ORDER — CEFEPIME 2 G/20ML
1000 INJECTION, POWDER, FOR SOLUTION INTRAVENOUS ONCE
Refills: 0 | Status: COMPLETED | OUTPATIENT
Start: 2025-03-30 | End: 2025-03-30

## 2025-03-30 RX ORDER — SODIUM CHLORIDE 9 G/1000ML
1000 INJECTION, SOLUTION INTRAVENOUS
Refills: 0 | Status: DISCONTINUED | OUTPATIENT
Start: 2025-03-30 | End: 2025-03-31

## 2025-03-30 RX ORDER — CEFEPIME 2 G/20ML
1000 INJECTION, POWDER, FOR SOLUTION INTRAVENOUS ONCE
Refills: 0 | Status: DISCONTINUED | OUTPATIENT
Start: 2025-03-30 | End: 2025-03-30

## 2025-03-30 RX ORDER — LOSARTAN POTASSIUM 100 MG/1
25 TABLET, FILM COATED ORAL DAILY
Refills: 0 | Status: DISCONTINUED | OUTPATIENT
Start: 2025-03-30 | End: 2025-04-04

## 2025-03-30 RX ORDER — DEXTROSE 50 % IN WATER 50 %
25 SYRINGE (ML) INTRAVENOUS ONCE
Refills: 0 | Status: DISCONTINUED | OUTPATIENT
Start: 2025-03-30 | End: 2025-03-31

## 2025-03-30 RX ORDER — DEXTROSE 50 % IN WATER 50 %
12.5 SYRINGE (ML) INTRAVENOUS ONCE
Refills: 0 | Status: DISCONTINUED | OUTPATIENT
Start: 2025-03-30 | End: 2025-03-31

## 2025-03-30 RX ORDER — ENOXAPARIN SODIUM 100 MG/ML
40 INJECTION SUBCUTANEOUS EVERY 24 HOURS
Refills: 0 | Status: DISCONTINUED | OUTPATIENT
Start: 2025-03-30 | End: 2025-03-30

## 2025-03-30 RX ORDER — LOSARTAN POTASSIUM 100 MG/1
1 TABLET, FILM COATED ORAL
Refills: 0 | DISCHARGE

## 2025-03-30 RX ORDER — DEXTROSE 50 % IN WATER 50 %
15 SYRINGE (ML) INTRAVENOUS ONCE
Refills: 0 | Status: DISCONTINUED | OUTPATIENT
Start: 2025-03-30 | End: 2025-03-31

## 2025-03-30 RX ORDER — INSULIN LISPRO 100 U/ML
INJECTION, SOLUTION INTRAVENOUS; SUBCUTANEOUS EVERY 6 HOURS
Refills: 0 | Status: DISCONTINUED | OUTPATIENT
Start: 2025-03-30 | End: 2025-03-31

## 2025-03-30 RX ORDER — LEVOTHYROXINE SODIUM 300 MCG
112 TABLET ORAL DAILY
Refills: 0 | Status: DISCONTINUED | OUTPATIENT
Start: 2025-03-30 | End: 2025-04-04

## 2025-03-30 RX ORDER — METFORMIN HYDROCHLORIDE 850 MG/1
0 TABLET ORAL
Refills: 0 | DISCHARGE

## 2025-03-30 RX ORDER — GLUCAGON 3 MG/1
1 POWDER NASAL ONCE
Refills: 0 | Status: DISCONTINUED | OUTPATIENT
Start: 2025-03-30 | End: 2025-03-31

## 2025-03-30 RX ORDER — VANCOMYCIN HCL IN 5 % DEXTROSE 1.5G/250ML
1000 PLASTIC BAG, INJECTION (ML) INTRAVENOUS ONCE
Refills: 0 | Status: COMPLETED | OUTPATIENT
Start: 2025-03-30 | End: 2025-03-30

## 2025-03-30 RX ORDER — FLUTICASONE FUROATE, UMECLIDINIUM BROMIDE AND VILANTEROL TRIFENATATE 100; 62.5; 25 UG/1; UG/1; UG/1
1 POWDER RESPIRATORY (INHALATION)
Refills: 0 | DISCHARGE

## 2025-03-30 RX ADMIN — Medication 100 MILLILITER(S): at 23:33

## 2025-03-30 RX ADMIN — CEFEPIME 1000 MILLIGRAM(S): 2 INJECTION, POWDER, FOR SOLUTION INTRAVENOUS at 20:14

## 2025-03-30 RX ADMIN — Medication 2000 MILLILITER(S): at 20:14

## 2025-03-30 RX ADMIN — Medication 250 MILLIGRAM(S): at 20:14

## 2025-03-30 NOTE — ED ADULT NURSE NOTE - NSFALLRISKINTERV_ED_ALL_ED

## 2025-03-30 NOTE — ED PROVIDER NOTE - CLINICAL SUMMARY MEDICAL DECISION MAKING FREE TEXT BOX
63 y/o F with hx of anal cancer on radiation and oral chemo with tmax of 102 with urinary complaints and abd pain. Plan for screening, EKG, CXR, CT abd, labs, urine and re-eval 65 y/o F with hx of anal cancer on radiation and oral chemo with tmax of 102 with urinary complaints and abd pain. Plan for screening, EKG, CXR, CT abd, labs, urine and re-eval    Kayley DO; endorsed to Dr. Self for admission.

## 2025-03-30 NOTE — ED ADULT NURSE NOTE - OBJECTIVE STATEMENT
Pt is a 64yr old female, A&OX4 and ambulatory, c/o fever, decreased urinary frequency, generalized weakness, and cough/congestion since Friday. HX of anal CA currently on chemo and radiation. Last chemo on Friday. Instructed to come to ED by oncology office. Pt states she feels very dehydrated with dry mouth. Denies chest pain, SOB, HA, dizziness, abd. pain, and N/V. Labs drawn and sent as per MD order. Pt in no acute distress.

## 2025-03-30 NOTE — ED ADULT NURSE REASSESSMENT NOTE - NS ED NURSE REASSESS COMMENT FT1
Received report from SPEEDY [place]. Pt AOX4 and ambulatory. Pt denies any complaints at this time. POC explained, pending admission bed.

## 2025-03-30 NOTE — ED ADULT NURSE NOTE - NS_SISCREENINGSR_GEN_ALL_ED
Negative Xolair Counseling:  Patient informed of potential adverse effects including but not limited to fever, muscle aches, rash and allergic reactions.  The patient verbalized understanding of the proper use and possible adverse effects of Xolair.  All of the patient's questions and concerns were addressed.

## 2025-03-30 NOTE — ED ADULT TRIAGE NOTE - CHIEF COMPLAINT QUOTE
Pt presents to er with complaints of anal cancer, last chemo and radiation was last Friday, states she had a fever today, sent in by [facility].

## 2025-03-30 NOTE — H&P ADULT - HISTORY OF PRESENT ILLNESS
63 y/o F w/ PMH of DM2, hypothyroidism, OA, anal cancer (s/p chemo / RT), COPD, p/w generalized weakness. Patient states she got radiation on Friday, and did not feel well after that. On Saturday she barely got out of bed. States she normally has stool leaking due to her anal cancer, and it has not increased in quantity or consistency. States that she also has some mid-abdominal discomfort which started in ED. C/o nausea, but denies vomiting. States she had a Tmax or 102 at home.       PSH: R hip surgery    Social Hx: Former smoker, denies etoh / drugs     Family Hx: Mother - colon cancer, father - pancreatic cancer

## 2025-03-30 NOTE — ED PROVIDER NOTE - OBJECTIVE STATEMENT
65 y/o F with PMHx of DM2, hypothyroidism, osteoarthritis [place] [facility] [place]. Last chemo (2500mg 2x day only taken on weekdays) and radiation (14 days done out of 6 week radiation regimen) was last Friday. Pt states Friday night her mouth is unusually dry, has normal PO intake but no urinary output. Pt states she has not ate for 3 days. Pt denies dysuria, recent UTI's. 65 y/o F with PMHx of DM2, hypothyroidism, osteoarthritis presents to the ED from [facility]; hx of anal cancer; last chemo (oral) and last radiation session 2 days ago; patient with generalized weakness; urinary frequency; diffuse abdominal pain x yesterday; tmax: 102 today; prompting visit to ED; no chest pain; no sob.

## 2025-03-30 NOTE — H&P ADULT - ASSESSMENT
63 y/o F w/ PMH of DM2, hypothyroidism, OA, anal cancer (s/p chemo / RT), COPD, p/w generalized weakness    *Sepsis 2/2 Colitis   *Anal Cancer  -CT: Mild non-specific rectosigmoid colitis.  There are appendicoliths measuring 3 mm in the proximal appendix and 1.2 cm at the cecal base.   -IVF  -NPO  -Lactate = 0.9  -F/u cultures   -Zosyn   -GI consult  -Heme/onc consult   -Surgery consult as patient has large appendicoliths on CT and may be high risk for appendicitis     *Hyponatremia  -Recheck s/p IVF   *Renal cyst   -F/u outpatient     *Hypothyroidism  -C/w home meds     *DM2  -Humalog ISS Q6H while NPO     *H/o COPD / OA / hypothyroidism  -C/w home meds and f/u outpatient for further management if conditions remain stable during hospitalization     *DVT ppx  -Lovenox     >75 mins required for admission      63 y/o F w/ PMH of DM2, hypothyroidism, OA, anal cancer (s/p chemo / RT), COPD, p/w generalized weakness    *Sepsis 2/2 Colitis   *Anal Cancer  *Thrombocytopenia   -CT: Mild non-specific rectosigmoid colitis.  There are appendicoliths measuring 3 mm in the proximal appendix and 1.2 cm at the cecal base.   -IVF  -NPO  -Lactate = 0.9  -F/u cultures   -Zosyn   -ID consult   -Heme/onc consult  -Surgery consult as patient has large appendicoliths on CT and may be high risk for appendicitis     *Hyponatremia  -Recheck s/p IVF     *Renal cyst   -F/u outpatient     *Hypothyroidism  -C/w home meds     *DM2  -Humalog ISS Q6H while NPO     *H/o COPD / OA / hypothyroidism  -C/w home meds and f/u outpatient for further management if conditions remain stable during hospitalization     *DVT ppx  -SCDs 2/2 thrombocytopenia     >75 mins required for admission      63 y/o F w/ PMH of DM2, hypothyroidism, OA, anal cancer (s/p chemo / RT), COPD, p/w generalized weakness    *Sepsis 2/2 Colitis   *Anal Cancer  *Thrombocytopenia   -CT: Mild non-specific rectosigmoid colitis.  There are appendicoliths measuring 3 mm in the proximal appendix and 1.2 cm at the cecal base.   -IVF  -NPO  -Lactate = 0.9  -F/u cultures   -Zosyn   -ID consult   -Heme/onc consult  -Surgery consult as patient has large appendicoliths on CT and may be high risk for appendicitis   -EKG: NSR 71 bpm, incomplete RBBB (f/u outpatient Cardio)     *Hyponatremia  -Recheck s/p IVF     *Renal cyst   -F/u outpatient     *Hypothyroidism  -C/w home meds     *DM2  -Humalog ISS Q6H while NPO     *H/o COPD / OA / hypothyroidism  -C/w home meds and f/u outpatient for further management if conditions remain stable during hospitalization     *DVT ppx  -SCDs 2/2 thrombocytopenia     >75 mins required for admission

## 2025-03-31 LAB
A1C WITH ESTIMATED AVERAGE GLUCOSE RESULT: 5.6 % — SIGNIFICANT CHANGE UP (ref 4–5.6)
ALBUMIN SERPL ELPH-MCNC: 2.4 G/DL — LOW (ref 3.3–5)
ALP SERPL-CCNC: 54 U/L — SIGNIFICANT CHANGE UP (ref 40–120)
ALT FLD-CCNC: 31 U/L — SIGNIFICANT CHANGE UP (ref 12–78)
ANION GAP SERPL CALC-SCNC: 5 MMOL/L — SIGNIFICANT CHANGE UP (ref 5–17)
APTT BLD: 30.9 SEC — SIGNIFICANT CHANGE UP (ref 24.5–35.6)
AST SERPL-CCNC: 22 U/L — SIGNIFICANT CHANGE UP (ref 15–37)
BASOPHILS # BLD AUTO: 0.02 K/UL — SIGNIFICANT CHANGE UP (ref 0–0.2)
BASOPHILS NFR BLD AUTO: 1.1 % — SIGNIFICANT CHANGE UP (ref 0–2)
BILIRUB SERPL-MCNC: 0.4 MG/DL — SIGNIFICANT CHANGE UP (ref 0.2–1.2)
BUN SERPL-MCNC: 6 MG/DL — LOW (ref 7–23)
CALCIUM SERPL-MCNC: 8 MG/DL — LOW (ref 8.5–10.1)
CHLORIDE SERPL-SCNC: 107 MMOL/L — SIGNIFICANT CHANGE UP (ref 96–108)
CO2 SERPL-SCNC: 27 MMOL/L — SIGNIFICANT CHANGE UP (ref 22–31)
CREAT SERPL-MCNC: 0.52 MG/DL — SIGNIFICANT CHANGE UP (ref 0.5–1.3)
EGFR: 103 ML/MIN/1.73M2 — SIGNIFICANT CHANGE UP
EGFR: 103 ML/MIN/1.73M2 — SIGNIFICANT CHANGE UP
EOSINOPHIL # BLD AUTO: 0.07 K/UL — SIGNIFICANT CHANGE UP (ref 0–0.5)
EOSINOPHIL NFR BLD AUTO: 4 % — SIGNIFICANT CHANGE UP (ref 0–6)
ESTIMATED AVERAGE GLUCOSE: 114 MG/DL — SIGNIFICANT CHANGE UP (ref 68–114)
GLUCOSE BLDC GLUCOMTR-MCNC: 83 MG/DL — SIGNIFICANT CHANGE UP (ref 70–99)
GLUCOSE BLDC GLUCOMTR-MCNC: 91 MG/DL — SIGNIFICANT CHANGE UP (ref 70–99)
GLUCOSE BLDC GLUCOMTR-MCNC: 95 MG/DL — SIGNIFICANT CHANGE UP (ref 70–99)
GLUCOSE SERPL-MCNC: 89 MG/DL — SIGNIFICANT CHANGE UP (ref 70–99)
HCT VFR BLD CALC: 30.9 % — LOW (ref 34.5–45)
HGB BLD-MCNC: 10.3 G/DL — LOW (ref 11.5–15.5)
IMM GRANULOCYTES # BLD AUTO: 0.05 K/UL — SIGNIFICANT CHANGE UP (ref 0–0.07)
IMM GRANULOCYTES NFR BLD AUTO: 2.8 % — HIGH (ref 0–0.9)
INR BLD: 1.19 RATIO — HIGH (ref 0.85–1.16)
LYMPHOCYTES # BLD AUTO: 0.28 K/UL — LOW (ref 1–3.3)
LYMPHOCYTES NFR BLD AUTO: 15.8 % — SIGNIFICANT CHANGE UP (ref 13–44)
MCHC RBC-ENTMCNC: 27.9 PG — SIGNIFICANT CHANGE UP (ref 27–34)
MCHC RBC-ENTMCNC: 33.3 G/DL — SIGNIFICANT CHANGE UP (ref 32–36)
MCV RBC AUTO: 83.7 FL — SIGNIFICANT CHANGE UP (ref 80–100)
MONOCYTES # BLD AUTO: 0.27 K/UL — SIGNIFICANT CHANGE UP (ref 0–0.9)
MONOCYTES NFR BLD AUTO: 15.3 % — HIGH (ref 2–14)
NEUTROPHILS # BLD AUTO: 1.08 K/UL — LOW (ref 1.8–7.4)
NEUTROPHILS NFR BLD AUTO: 61 % — SIGNIFICANT CHANGE UP (ref 43–77)
NRBC # BLD AUTO: 0 K/UL — SIGNIFICANT CHANGE UP (ref 0–0)
NRBC # FLD: 0 K/UL — SIGNIFICANT CHANGE UP (ref 0–0)
NRBC BLD AUTO-RTO: 0 /100 WBCS — SIGNIFICANT CHANGE UP (ref 0–0)
PLATELET # BLD AUTO: 107 K/UL — LOW (ref 150–400)
PMV BLD: 9 FL — SIGNIFICANT CHANGE UP (ref 7–13)
POTASSIUM SERPL-MCNC: 3.3 MMOL/L — LOW (ref 3.5–5.3)
POTASSIUM SERPL-SCNC: 3.3 MMOL/L — LOW (ref 3.5–5.3)
PROT SERPL-MCNC: 5.1 GM/DL — LOW (ref 6–8.3)
PROTHROM AB SERPL-ACNC: 13.7 SEC — HIGH (ref 9.9–13.4)
RBC # BLD: 3.69 M/UL — LOW (ref 3.8–5.2)
RBC # FLD: 14.3 % — SIGNIFICANT CHANGE UP (ref 10.3–14.5)
SODIUM SERPL-SCNC: 139 MMOL/L — SIGNIFICANT CHANGE UP (ref 135–145)
WBC # BLD: 1.77 K/UL — LOW (ref 3.8–10.5)
WBC # FLD AUTO: 1.77 K/UL — LOW (ref 3.8–10.5)

## 2025-03-31 PROCEDURE — 99254 IP/OBS CNSLTJ NEW/EST MOD 60: CPT

## 2025-03-31 PROCEDURE — 73030 X-RAY EXAM OF SHOULDER: CPT | Mod: 26,RT

## 2025-03-31 PROCEDURE — 99233 SBSQ HOSP IP/OBS HIGH 50: CPT

## 2025-03-31 RX ORDER — ACETAMINOPHEN 500 MG/5ML
650 LIQUID (ML) ORAL EVERY 6 HOURS
Refills: 0 | Status: DISCONTINUED | OUTPATIENT
Start: 2025-03-31 | End: 2025-04-04

## 2025-03-31 RX ORDER — ALBUTEROL SULFATE 2.5 MG/3ML
2 VIAL, NEBULIZER (ML) INHALATION EVERY 6 HOURS
Refills: 0 | Status: DISCONTINUED | OUTPATIENT
Start: 2025-03-31 | End: 2025-04-04

## 2025-03-31 RX ORDER — LIDOCAINE HCL/PF 10 MG/ML
5 VIAL (ML) INJECTION THREE TIMES A DAY
Refills: 0 | Status: DISCONTINUED | OUTPATIENT
Start: 2025-03-31 | End: 2025-04-04

## 2025-03-31 RX ADMIN — Medication 40 MILLIEQUIVALENT(S): at 14:23

## 2025-03-31 RX ADMIN — Medication 5 MILLILITER(S): at 22:00

## 2025-03-31 RX ADMIN — Medication 2 MILLIGRAM(S): at 21:20

## 2025-03-31 RX ADMIN — Medication 25 GRAM(S): at 06:28

## 2025-03-31 RX ADMIN — Medication 25 GRAM(S): at 14:23

## 2025-03-31 RX ADMIN — Medication 5 MILLILITER(S): at 14:23

## 2025-03-31 RX ADMIN — Medication 650 MILLIGRAM(S): at 16:38

## 2025-03-31 RX ADMIN — Medication 650 MILLIGRAM(S): at 01:48

## 2025-03-31 RX ADMIN — Medication 100 MILLILITER(S): at 12:30

## 2025-03-31 RX ADMIN — LOSARTAN POTASSIUM 25 MILLIGRAM(S): 100 TABLET, FILM COATED ORAL at 08:37

## 2025-03-31 RX ADMIN — Medication 25 GRAM(S): at 21:05

## 2025-03-31 RX ADMIN — Medication 650 MILLIGRAM(S): at 16:08

## 2025-03-31 RX ADMIN — Medication 112 MICROGRAM(S): at 06:27

## 2025-03-31 RX ADMIN — Medication 2 MILLIGRAM(S): at 21:05

## 2025-03-31 NOTE — CONSULT NOTE ADULT - ASSESSMENT
65 yo F with Anal SCC under active treatment at [facility] [place]  Likely has radiation induced colitis      Recommend heme/onc consultation   Advance diet as tolerated   Abx for colitis  No acute surgical intervention indicated at this time  Should patient need any surgical intervention, should be transferred to [facility] [place]  Please reconsult PRN      Plan discussed with Dr. Forde
63 y/o F w/ PMH anal cancer currently on chemo / RT at [facility] [place] on xeloda and mitomycin with RT, COPD, DM p/w generalized weakness.     # anal cancer  - follows at [facility] [place]   - currently on RT concurrent with xeloda on D1-5 and mitomycin on D1,29   - pt has 2 more weeks of chemoRT left at [facility] [place]   - c/w aquafor and lidocaine cream for anal ulceration  - no plan for continued treatment while in house with xeloda   - GI consulted  - no plan for intervention  - colitis likely treatment related    # fever  - WBC 2.4, Hb 11.2, plt 119, anc 1600, Na 132  - UA neg for UTI- moderate blood  - viral panel negative for covid, flu, rsv   - CT a/p with mild nonspecific colitis likely treatment related - no free air or focal collection, no appendicitis to explain right lower quadrant abd pain   - c/w zosyn to cover GI source, tylenol for fever     will follow and communicate with [facility] 
63 y/o Female with h/o DM2, hypothyroidism, OA, anal cancer (s/p chemo / RT), COPD was admitted on 3/30 for generalized weakness. Patient states she got radiation on Friday, and did not feel well after that. On Saturday she barely got out of bed. States she normally has stool leaking due to her anal cancer, and it has not increased in quantity or consistency. States that she also has some mid-abdominal discomfort which started in ED. C/o nausea, but denies vomiting. States she had a Tmax or 102 at home. In ER she received zosyn.     #Febrile syndrome ?cause  #Leukopenia  #Mild nonspecific rectosigmoid colitis ?related to XRT, ?inflammatory  #Anal cancer s/p XRT and chemotherapy  #Immunocompromised host.  -obtain BC x 2, urine c/s  -stool studies  -agree with zosyn 3.375 gm IV q8h pending further diagnostic workup  -reason for abx use and side effects reviewed with patient; monitor BMP and vancomycin trough levels   -concern for infection with multiresistant organisms is raised. Prior cultures reviewed. An epidemiologic assessment was performed. The risk of the microorganism spread to family members, healthcare staff is low. Standard isolation in place at present time. Will reconsider isolation measures according to infection control policy, further culture results and other new information. The patient will be monitored closely, cultures collected as appropriate.   -old chart reviewed to assess prior cultures  -monitor temps  -f/u CBC  -supportive care  2. Other issues:   -care per medicine

## 2025-03-31 NOTE — CONSULT NOTE ADULT - TIME BILLING
Admitted with abdominal pain.  Currently undergoing CRT for anal SCC with [facility].  CT A/P with nonspecific rectosigmoid colitis.  No reports of worsening diarrhea.    Vital Signs Last 24 Hrs  T(C): 36.4 (31 Mar 2025 07:53), Max: 38.3 (31 Mar 2025 01:45)  T(F): 97.5 (31 Mar 2025 07:53), Max: 101 (31 Mar 2025 02:32)  HR: 69 (31 Mar 2025 07:53) (69 - 86)  BP: 117/53 (31 Mar 2025 07:53) (112/74 - 137/80)  BP(mean): 95 (30 Mar 2025 23:28) (81 - 95)  RR: 16 (31 Mar 2025 07:53) (14 - 18)  SpO2: 98% (31 Mar 2025 07:53) (95% - 100%)                          10.3   1.77  )-----------( 107      ( 31 Mar 2025 07:46 )             30.9   03-31    139  |  107  |  6[L]  ----------------------------<  89  3.3[L]   |  27  |  0.52    Ca    8.0[L]      31 Mar 2025 07:46    TPro  5.1[L]  /  Alb  2.4[L]  /  TBili  0.4  /  DBili  x   /  AST  22  /  ALT  31  /  AlkPhos  54  03-31    < from: CT Abdomen and Pelvis w/ IV Cont (03.30.25 @ 20:36) >    Mild nonspecific rectosigmoid colitis may be related to post therapeutic   change, inflammatory, or infectious etiology. No free air or focal   collection.  Soft tissue fullness of the anorectal soft tissues compatible with   history of anal carcinoma. No upstream dilatation.  Diverticulosis.    No evidence of acute appendicitis at this time. There are appendicoliths   at the cecal base and proximal appendix.      A/P - Nonspecific rectosigmoid colitis without clinical reports of diarrhea    Hydrate and diet as tolerated. Empric abx.  Can obtain GI PCR to r/o viral/bacterial pathogens despite absence of diarrhea.  Unlikely to require surgical intervention based on above.  Please reconsult as needed.

## 2025-03-31 NOTE — CONSULT NOTE ADULT - SUBJECTIVE AND OBJECTIVE BOX
HPI:    63 yo F with DM, OA, hypothyroidism, anal SCC diagnosed in 2025, actively undergoing chemo and radiation (last session Friday) presents with weakness and fever x 1 day. She has been febrile to 101 in the hospital aswell. Reports stool incontinence and abdominal pain that started Friday.    PAST MEDICAL & SURGICAL HISTORY:  Osteoarthritis  s/p R THR       Hypothyroidism      DM2 (diabetes mellitus, type 2)      White coat syndrome without diagnosis of hypertension      History of total hip replacement, right  , [facility]          MEDICATIONS  (STANDING):  dextrose 5%. 1000 milliLiter(s) (50 mL/Hr) IV Continuous <Continuous>  dextrose 5%. 1000 milliLiter(s) (100 mL/Hr) IV Continuous <Continuous>  dextrose 50% Injectable 25 Gram(s) IV Push once  dextrose 50% Injectable 12.5 Gram(s) IV Push once  dextrose 50% Injectable 25 Gram(s) IV Push once  glucagon  Injectable 1 milliGRAM(s) IntraMuscular once  insulin lispro (ADMELOG) corrective regimen sliding scale   SubCutaneous every 6 hours  levothyroxine 112 MICROGram(s) Oral daily  losartan 25 milliGRAM(s) Oral daily  piperacillin/tazobactam IVPB.. 3.375 Gram(s) IV Intermittent every 8 hours  sodium chloride 0.9%. 1000 milliLiter(s) (100 mL/Hr) IV Continuous <Continuous>    MEDICATIONS  (PRN):  acetaminophen     Tablet .. 650 milliGRAM(s) Oral every 6 hours PRN Temp greater or equal to 38C (100.4F), Mild Pain (1 - 3)  dextrose Oral Gel 15 Gram(s) Oral once PRN Blood Glucose LESS THAN 70 milliGRAM(s)/deciliter  morphine  - Injectable 2 milliGRAM(s) IV Push every 4 hours PRN Severe Pain (7 - 10)  oxycodone    5 mG/acetaminophen 325 mG 1 Tablet(s) Oral every 4 hours PRN Moderate Pain (4 - 6)      Allergies    No Known Allergies    Intolerances        SOCIAL HISTORY:    FAMILY HISTORY:  FH: pancreatic cancer (Father)    FH: COPD (chronic obstructive pulmonary disease) (Mother)            Physical Exam:  General: NAD, resting comfortably  HEENT: NC/AT, EOMI, normal hearing, no oral lesions, no LAD, neck supple  Pulmonary: normal resp effort, CTA-B  Cardiovascular: NSR, no murmurs  Abdominal: soft, ND, tender in the epigastrium periumbilical and LUQ region, no rebound or guarding  Extremities: WWP, normal strength, no clubbing/cyanosis/edema  Neuro: A/O x 3, CNs II-XII grossly intact, normal sensation, no focal deficits      Vital Signs Last 24 Hrs  T(C): 36.4 (31 Mar 2025 07:53), Max: 38.3 (31 Mar 2025 01:45)  T(F): 97.5 (31 Mar 2025 07:53), Max: 101 (31 Mar 2025 02:32)  HR: 69 (31 Mar 2025 07:53) (69 - 86)  BP: 117/53 (31 Mar 2025 07:53) (112/74 - 137/80)  BP(mean): 95 (30 Mar 2025 23:28) (81 - 95)  RR: 16 (31 Mar 2025 07:53) (14 - 18)  SpO2: 98% (31 Mar 2025 07:53) (95% - 100%)    Parameters below as of 31 Mar 2025 07:53  Patient On (Oxygen Delivery Method): room air            LABS:                        11.2   2.42  )-----------( 119      ( 30 Mar 2025 18:56 )             34.6     03-30    132[L]  |  98  |  8   ----------------------------<  97  3.8   |  29  |  0.62    Ca    8.9      30 Mar 2025 18:56    TPro  6.3  /  Alb  3.0[L]  /  TBili  0.4  /  DBili  x   /  AST  31  /  ALT  42  /  AlkPhos  56  03-30    PT/INR - ( 30 Mar 2025 18:56 )   PT: 13.2 sec;   INR: 1.12 ratio         PTT - ( 30 Mar 2025 18:56 )  PTT:31.7 sec  Urinalysis Basic - ( 30 Mar 2025 20:55 )    Color: Yellow / Appearance: Clear / S.018 / pH: x  Gluc: x / Ketone: 15 mg/dL  / Bili: Negative / Urobili: 0.2 mg/dL   Blood: x / Protein: Negative mg/dL / Nitrite: Negative   Leuk Esterase: Trace / RBC: 21 /HPF / WBC 1 /HPF   Sq Epi: x / Non Sq Epi: 0 /HPF / Bacteria: Negative /HPF      CAPILLARY BLOOD GLUCOSE      POCT Blood Glucose.: 95 mg/dL (31 Mar 2025 05:54)  POCT Blood Glucose.: 83 mg/dL (31 Mar 2025 01:10)    LIVER FUNCTIONS - ( 30 Mar 2025 18:56 )  Alb: 3.0 g/dL / Pro: 6.3 gm/dL / ALK PHOS: 56 U/L / ALT: 42 U/L / AST: 31 U/L / GGT: x             Cultures:      RADIOLOGY & ADDITIONAL STUDIES:    < from: CT Abdomen and Pelvis w/ IV Cont (25 @ 20:36) >  IMPRESSION:    Mild nonspecific rectosigmoid colitis may be related to post therapeutic   change, inflammatory, or infectious etiology. No free air or focal   collection.  Soft tissue fullness of the anorectal soft tissues compatible with   history of anal carcinoma. No upstream dilatation.  Diverticulosis.    No evidence of acute appendicitis at this time. There are appendicoliths   at the cecal base and proximal appendix.      < end of copied text >

## 2025-03-31 NOTE — PROGRESS NOTE ADULT - ASSESSMENT
63 y/o F w/ PMH of DM2, hypothyroidism, OA, anal cancer (s/p chemo / RT), COPD, p/w generalized weakness. Patient states she got radiation on Friday, and did not feel well after that. On Saturday she barely got out of bed. States she normally has stool leaking due to her anal cancer, and it has not increased in quantity or consistency. States that she also has some mid-abdominal discomfort which started in ED. C/o nausea, but denies vomiting. States she had a Tmax or 102 at home.    # Sepsis 2/2 Colitis, likely from radiation   # Fever  # Anal Cancer SP chemo and RT  - ADAT  - Lactate normal  - RVP negative  - Urine appears ok, fu culture  - FU blood cultures  - Continue IV zosyn  -ID consult appreciated  -Heme/onc consult appreciated   -Surgery consult appreciated    # Renal cyst   -F/u outpatient     # anal cancer  - follows at [facility] [place]   - currently on RT concurrent with xeloda on D1-5 and mitomycin on D1,29   - pt has 2 more weeks of chemoRT left at [facility] [place]   - c/w aquafor and lidocaine cream for anal ulceration  - no plan for continued treatment while in house with xeloda     # Hypothyroidism  - Levothyroxine     # DM2  - ISS    # HTN  - Continue losartan    # DVT prophylaxis  - SCDS  65 y/o F w/ PMH of DM2, hypothyroidism, OA, anal cancer (s/p chemo / RT), COPD, p/w generalized weakness. Patient states she got radiation on Friday, and did not feel well after that. On Saturday she barely got out of bed. States she normally has stool leaking due to her anal cancer, and it has not increased in quantity or consistency. States that she also has some mid-abdominal discomfort which started in ED. C/o nausea, but denies vomiting. States she had a Tmax or 102 at home.    # Sepsis 2/2 Colitis, likely from radiation   # Fever  # Anal Cancer SP chemo and RT  - ADAT  - Lactate normal  - RVP negative  - Urine appears ok, fu culture  - FU blood cultures  - Continue IV zosyn  -ID consult appreciated  -Heme/onc consult appreciated   -Surgery consult appreciated    # Renal cyst   -F/u outpatient     # anal cancer  - follows at [facility] [place]   - currently on RT concurrent with xeloda on D1-5 and mitomycin on D1,29   - pt has 2 more weeks of chemoRT left at [facility] [place]   - c/w aquafor and lidocaine cream for anal ulceration  - no plan for continued treatment while in house with xeloda     # Hypothyroidism  - Levothyroxine     # COPD  - Stable, on trelegy at home  - Start advair   - Albuterol prn     # DM2  - A1c 5.6  - Observe off insulin     # HTN  - Continue losartan    # DVT prophylaxis  - SCDS  65 y/o F w/ PMH of DM2, hypothyroidism, OA, anal cancer (s/p chemo / RT), COPD, p/w generalized weakness. Patient states she got radiation on Friday, and did not feel well after that. On Saturday she barely got out of bed. States she normally has stool leaking due to her anal cancer, and it has not increased in quantity or consistency. States that she also has some mid-abdominal discomfort which started in ED. C/o nausea, but denies vomiting. States she had a Tmax or 102 at home.    # Sepsis 2/2 Colitis, likely from radiation   # Fever  # Anal Cancer SP chemo and RT  - ADAT  - Lactate normal  - RVP negative  - Urine appears ok, fu culture  - FU blood cultures  - Continue IV zosyn  - ID consult appreciated  -Heme/onc consult appreciated   -Surgery consult appreciated    # Renal cyst   -F/u outpatient     # anal cancer  - [place] [facility] [place] on RT concurrent with xeloda on D1-5 and mitomycin on D1,29   - pt has 2 more weeks of [place] [facility] [place]   - c/w aquafor and lidocaine cream for anal ulceration  - no plan for continued treatment while in house with xeloda     # Hypothyroidism  - Levothyroxine     # COPD  - Stable, on trelegy at home  - Start advair   - Albuterol prn     # DM2  - Overcontrolled   - A1c 5.6  - Observe off insulin     # HTN  - Continue losartan    # DVT prophylaxis  - SCDS  65 y/o F w/ PMH of DM2, hypothyroidism, OA, anal cancer (s/p chemo / RT), COPD, p/w generalized weakness. Patient states she got radiation on Friday, and did not feel well after that. On Saturday she barely got out of bed. States she normally has stool leaking due to her anal cancer, and it has not increased in quantity or consistency. States that she also has some mid-abdominal discomfort which started in ED. C/o nausea, but denies vomiting. States she had a Tmax or 102 at home.    # Sepsis 2/2 Colitis, likely from radiation   # Fever  # Anal Cancer SP chemo and RT  # Sclerotic Lesion Right humerus on Chest xray  - ADAT  - Lactate normal  - RVP negative  - Urine appears ok, fu culture  - FU blood cultures  - Continue IV zosyn  - ID consult appreciated  -Heme/onc consult appreciated   -Surgery consult appreciated  - Right shoulder xray for sclerotic lesion     # Renal cyst   -F/u outpatient     # anal cancer  - follows at [facility] [place] [place]   - currently on RT concurrent with xeloda on D1-5 and mitomycin on D1,29   - pt has 2 more weeks of chemoRT left at [facility] [place] [place]   - c/w aquafor and lidocaine cream for anal ulceration  - no plan for continued treatment while in house with xeloda     # Hypothyroidism  - Levothyroxine     # COPD  - Stable, on trelegy at home  - Start advair   - Albuterol prn     # DM2  - Overcontrolled   - A1c 5.6  - Observe off insulin     # HTN  - Continue losartan    # DVT prophylaxis  - SCDS

## 2025-03-31 NOTE — CONSULT NOTE ADULT - SUBJECTIVE AND OBJECTIVE BOX
Patient is a 64y old  Female who presents with a chief complaint of generalized weakness (31 Mar 2025 09:30)    HPI:  63 y/o F w/ PMH of DM2, hypothyroidism, OA, anal cancer (s/p chemo / RT), COPD, p/w generalized weakness. Patient states she got radiation on Friday, and did not feel well after that. On Saturday she barely got out of bed. States she normally has stool leaking due to her anal cancer, and it has not increased in quantity or consistency. States that she also has some mid-abdominal discomfort which started in ED. C/o nausea, but denies vomiting. States she had a Tmax or 102 at home.       PSH: R hip surgery    Social Hx: Former smoker, denies etoh / drugs     Family Hx: Mother - colon cancer, father - pancreatic cancer  (30 Mar 2025 22:10)      PMH: as above  PSH: as above  Meds: per reconciliation sheet, noted below  MEDICATIONS  (STANDING):  dextrose 5%. 1000 milliLiter(s) (50 mL/Hr) IV Continuous <Continuous>  dextrose 5%. 1000 milliLiter(s) (100 mL/Hr) IV Continuous <Continuous>  dextrose 50% Injectable 25 Gram(s) IV Push once  dextrose 50% Injectable 12.5 Gram(s) IV Push once  dextrose 50% Injectable 25 Gram(s) IV Push once  glucagon  Injectable 1 milliGRAM(s) IntraMuscular once  insulin lispro (ADMELOG) corrective regimen sliding scale   SubCutaneous every 6 hours  levothyroxine 112 MICROGram(s) Oral daily  lidocaine 2% Jelly 5 milliLiter(s) Topical three times a day  losartan 25 milliGRAM(s) Oral daily  piperacillin/tazobactam IVPB.. 3.375 Gram(s) IV Intermittent every 8 hours  potassium chloride   Powder 40 milliEquivalent(s) Oral once  sodium chloride 0.9%. 1000 milliLiter(s) (100 mL/Hr) IV Continuous <Continuous>    MEDICATIONS  (PRN):  acetaminophen     Tablet .. 650 milliGRAM(s) Oral every 6 hours PRN Temp greater or equal to 38C (100.4F), Mild Pain (1 - 3)  dextrose Oral Gel 15 Gram(s) Oral once PRN Blood Glucose LESS THAN 70 milliGRAM(s)/deciliter  morphine  - Injectable 2 milliGRAM(s) IV Push every 4 hours PRN Severe Pain (7 - 10)  oxycodone    5 mG/acetaminophen 325 mG 1 Tablet(s) Oral every 4 hours PRN Moderate Pain (4 - 6)    Allergies    No Known Allergies    Intolerances      Social: no smoking, no alcohol, no illegal drugs; no recent travel, no exposure to TB  FAMILY HISTORY:  FH: pancreatic cancer (Father)    FH: COPD (chronic obstructive pulmonary disease) (Mother)      no history of premature cardiovascular disease in first degree relatives    ROS: the patient denies fever, no chills, no HA, no seizures, no dizziness, no sore throat, no nasal congestion, no blurry vision, no CP, no palpitations, no SOB, no cough, no abdominal pain, no diarrhea, no N/V, no dysuria, no leg pain, no claudication, no rash, no joint aches, no rectal pain or bleeding, no night sweats  All other systems reviewed and are negative    Vital Signs Last 24 Hrs  T(C): 36.4 (31 Mar 2025 07:53), Max: 38.3 (31 Mar 2025 01:45)  T(F): 97.5 (31 Mar 2025 07:53), Max: 101 (31 Mar 2025 02:32)  HR: 69 (31 Mar 2025 07:53) (69 - 86)  BP: 117/53 (31 Mar 2025 07:53) (112/74 - 137/80)  BP(mean): 95 (30 Mar 2025 23:28) (81 - 95)  RR: 16 (31 Mar 2025 07:53) (14 - 18)  SpO2: 98% (31 Mar 2025 07:53) (95% - 100%)    Parameters below as of 31 Mar 2025 07:53  Patient On (Oxygen Delivery Method): room air      Daily     Daily     PE:    Constitutional:  No acute distress  HEENT: NC/AT, EOMI, PERRLA, conjunctivae clear; ears and nose atraumatic; pharynx benign  Neck: supple; thyroid not palpable  Back: no tenderness  Respiratory: respiratory effort normal; clear to auscultation  Cardiovascular: S1S2 regular, no murmurs  Abdomen: soft, not tender, not distended, positive BS; no liver or spleen organomegaly  Genitourinary: no suprapubic tenderness  Lymphatic: no LN palpable  Musculoskeletal: no muscle tenderness, no joint swelling or tenderness  Extremities: no pedal edema  Neurological/ Psychiatric: AxOx3, judgement and insight normal; moving all extremities  Skin: no rashes; no palpable lesions    Labs: all available labs reviewed                        10.3   1.77  )-----------( 107      ( 31 Mar 2025 07:46 )             30.9     03-31    139  |  107  |  6[L]  ----------------------------<  89  3.3[L]   |  27  |  0.52    Ca    8.0[L]      31 Mar 2025 07:46    TPro  5.1[L]  /  Alb  2.4[L]  /  TBili  0.4  /  DBili  x   /  AST  22  /  ALT  31  /  AlkPhos  54  03-31     LIVER FUNCTIONS - ( 31 Mar 2025 07:46 )  Alb: 2.4 g/dL / Pro: 5.1 gm/dL / ALK PHOS: 54 U/L / ALT: 31 U/L / AST: 22 U/L / GGT: x           Urinalysis with Rflx Culture (03-30 @ 20:55)  Urine Appearance: Clear  Protein, Urine: Negative mg/dL  Urine Microscopic-Add On (NC) (03-30 @ 20:55)  White Blood Cell - Urine: 1 /HPF  Red Blood Cell - Urine: 21 /HPF    Urinalysis with Rflx Culture (collected 30 Mar 2025 20:55)    Radiology: all available radiological tests reviewed        Advanced directives addressed: full resuscitation Patient is a 64y old  Female who presents with a chief complaint of generalized weakness    HPI:  63 y/o Female with h/o DM2, hypothyroidism, OA, anal cancer (s/p chemo / RT), COPD was admitted on 3/30 for generalized weakness. Patient states she got radiation on Friday, and did not feel well after that. On Saturday she barely got out of bed. States she normally has stool leaking due to her anal cancer, and it has not increased in quantity or consistency. States that she also has some mid-abdominal discomfort which started in ED. C/o nausea, but denies vomiting. States she had a Tmax or 102 at home. In ER she received zosyn.     PSH: R hip surgery  PMH: as above    Meds: per reconciliation sheet, noted below  MEDICATIONS  (STANDING):  dextrose 5%. 1000 milliLiter(s) (50 mL/Hr) IV Continuous <Continuous>  dextrose 5%. 1000 milliLiter(s) (100 mL/Hr) IV Continuous <Continuous>  dextrose 50% Injectable 25 Gram(s) IV Push once  dextrose 50% Injectable 12.5 Gram(s) IV Push once  dextrose 50% Injectable 25 Gram(s) IV Push once  glucagon  Injectable 1 milliGRAM(s) IntraMuscular once  insulin lispro (ADMELOG) corrective regimen sliding scale   SubCutaneous every 6 hours  levothyroxine 112 MICROGram(s) Oral daily  lidocaine 2% Jelly 5 milliLiter(s) Topical three times a day  losartan 25 milliGRAM(s) Oral daily  piperacillin/tazobactam IVPB.. 3.375 Gram(s) IV Intermittent every 8 hours  potassium chloride   Powder 40 milliEquivalent(s) Oral once  sodium chloride 0.9%. 1000 milliLiter(s) (100 mL/Hr) IV Continuous <Continuous>    MEDICATIONS  (PRN):  acetaminophen     Tablet .. 650 milliGRAM(s) Oral every 6 hours PRN Temp greater or equal to 38C (100.4F), Mild Pain (1 - 3)  dextrose Oral Gel 15 Gram(s) Oral once PRN Blood Glucose LESS THAN 70 milliGRAM(s)/deciliter  morphine  - Injectable 2 milliGRAM(s) IV Push every 4 hours PRN Severe Pain (7 - 10)  oxycodone    5 mG/acetaminophen 325 mG 1 Tablet(s) Oral every 4 hours PRN Moderate Pain (4 - 6)    Allergies    No Known Allergies    Intolerances      Social: prior smoking, no alcohol, no illegal drugs; no recent travel, no exposure to TB  FAMILY HISTORY:  FH: pancreatic cancer (Father)    FH: COPD (chronic obstructive pulmonary disease) (Mother)      no history of premature cardiovascular disease in first degree relatives    ROS: the patient denies HA, no seizures, no dizziness, no sore throat, no nasal congestion, no blurry vision, no CP, no palpitations, no SOB, no cough, no abdominal pain, has diarrhea, no N/V, no dysuria, no leg pain, no claudication, no rash, no joint aches, no rectal pain or bleeding, no night sweats  All other systems reviewed and are negative    Vital Signs Last 24 Hrs  T(C): 36.4 (31 Mar 2025 07:53), Max: 38.3 (31 Mar 2025 01:45)  T(F): 97.5 (31 Mar 2025 07:53), Max: 101 (31 Mar 2025 02:32)  HR: 69 (31 Mar 2025 07:53) (69 - 86)  BP: 117/53 (31 Mar 2025 07:53) (112/74 - 137/80)  BP(mean): 95 (30 Mar 2025 23:28) (81 - 95)  RR: 16 (31 Mar 2025 07:53) (14 - 18)  SpO2: 98% (31 Mar 2025 07:53) (95% - 100%)    Parameters below as of 31 Mar 2025 07:53  Patient On (Oxygen Delivery Method): room air    PE:    Constitutional:  No acute distress  HEENT: NC/AT, EOMI, PERRLA, conjunctivae clear; ears and nose atraumatic; pharynx benign  Neck: supple; thyroid not palpable  Back: no tenderness  Respiratory: respiratory effort normal; clear to auscultation  Cardiovascular: S1S2 regular, no murmurs  Abdomen: soft, not tender, not distended, positive BS; no liver or spleen organomegaly  Genitourinary: no suprapubic tenderness  Lymphatic: no LN palpable  Musculoskeletal: no muscle tenderness, no joint swelling or tenderness  Extremities: no pedal edema  Neurological/ Psychiatric: AxOx3, judgement and insight normal; moving all extremities  Skin: no rashes; no palpable lesions    Labs: all available labs reviewed                        10.3   1.77  )-----------( 107      ( 31 Mar 2025 07:46 )             30.9     03-31    139  |  107  |  6[L]  ----------------------------<  89  3.3[L]   |  27  |  0.52    Ca    8.0[L]      31 Mar 2025 07:46    TPro  5.1[L]  /  Alb  2.4[L]  /  TBili  0.4  /  DBili  x   /  AST  22  /  ALT  31  /  AlkPhos  54  03-31     LIVER FUNCTIONS - ( 31 Mar 2025 07:46 )  Alb: 2.4 g/dL / Pro: 5.1 gm/dL / ALK PHOS: 54 U/L / ALT: 31 U/L / AST: 22 U/L / GGT: x           Urinalysis with Rflx Culture (03-30 @ 20:55)  Urine Appearance: Clear  Protein, Urine: Negative mg/dL  Urine Microscopic-Add On (NC) (03-30 @ 20:55)  White Blood Cell - Urine: 1 /HPF  Red Blood Cell - Urine: 21 /HPF    Urinalysis with Rflx Culture (collected 30 Mar 2025 20:55)    Radiology: all available radiological tests reviewed  < from: CT Abdomen and Pelvis w/ IV Cont (03.30.25 @ 20:36) >  Mild nonspecific rectosigmoid colitis may be related to post therapeutic change, inflammatory, or infectious etiology. No free air or focal collection.  Soft tissue fullness of the anorectal soft tissues compatible with history of anal carcinoma. No upstream dilatation.  Diverticulosis.  No evidence of acute appendicitis at this time. There are appendicoliths   < end of copied text >    Advanced directives addressed: full resuscitation

## 2025-03-31 NOTE — CONSULT NOTE ADULT - SUBJECTIVE AND OBJECTIVE BOX
HPI:  65 y/o F w/ PMH anal cancer currently on chemo / RT at [facility] [place] on xeloda and mitomycin with RT, COPD, DM p/w generalized weakness.     Patient states she got radiation on Friday, and did not feel well after that.   On Saturday she barely got out of bed. States she normally has stool leaking due to her anal cancer, and it has not increased in quantity or consistency.   States that she also has some mid-abdominal discomfort which started in ED. C/o nausea, but denies vomiting. States she had a Tmax or 102 at home.     - WBC 2.4, Hb 11.2, plt 119, anc 1600, Na 132  - UA neg for UTI- moderate blood  - viral panel negative for covid, flu, rsv   - CT a/p with mild nonspecific colitis likely treatment related - no free air or focal collection, no appendicitis to explain right lower quadrant abd pain     PSH: R hip surgery    Social Hx: Former smoker, denies etoh / drugs     Family Hx: Mother - colon cancer, father - pancreatic cancer  (30 Mar 2025 22:10)      PAST MEDICAL & SURGICAL HISTORY:  Osteoarthritis  s/p R THR 2011      Hypothyroidism      DM2 (diabetes mellitus, type 2)      White coat syndrome without diagnosis of hypertension      History of total hip replacement, right  2011, [facility]          Allergies    No Known Allergies    Intolerances        MEDICATIONS  (STANDING):  dextrose 5%. 1000 milliLiter(s) (50 mL/Hr) IV Continuous <Continuous>  dextrose 5%. 1000 milliLiter(s) (100 mL/Hr) IV Continuous <Continuous>  dextrose 50% Injectable 25 Gram(s) IV Push once  dextrose 50% Injectable 12.5 Gram(s) IV Push once  dextrose 50% Injectable 25 Gram(s) IV Push once  glucagon  Injectable 1 milliGRAM(s) IntraMuscular once  insulin lispro (ADMELOG) corrective regimen sliding scale   SubCutaneous every 6 hours  levothyroxine 112 MICROGram(s) Oral daily  lidocaine 2% Jelly 5 milliLiter(s) Topical three times a day  losartan 25 milliGRAM(s) Oral daily  piperacillin/tazobactam IVPB.. 3.375 Gram(s) IV Intermittent every 8 hours  sodium chloride 0.9%. 1000 milliLiter(s) (100 mL/Hr) IV Continuous <Continuous>    MEDICATIONS  (PRN):  acetaminophen     Tablet .. 650 milliGRAM(s) Oral every 6 hours PRN Temp greater or equal to 38C (100.4F), Mild Pain (1 - 3)  dextrose Oral Gel 15 Gram(s) Oral once PRN Blood Glucose LESS THAN 70 milliGRAM(s)/deciliter  morphine  - Injectable 2 milliGRAM(s) IV Push every 4 hours PRN Severe Pain (7 - 10)  oxycodone    5 mG/acetaminophen 325 mG 1 Tablet(s) Oral every 4 hours PRN Moderate Pain (4 - 6)      FAMILY HISTORY:  FH: pancreatic cancer (Father)    FH: COPD (chronic obstructive pulmonary disease) (Mother)        SOCIAL HISTORY: No EtOH, no tobacco    REVIEW OF SYSTEMS:    CONSTITUTIONAL:+fevers or chills  EYES/ENT: No visual changes;  No vertigo or throat pain   NECK: No pain or stiffness  RESPIRATORY: No cough, wheezing, hemoptysis; No shortness of breath  CARDIOVASCULAR: No chest pain or palpitations  GASTROINTESTINAL: +right sided abdominal pain. No nausea, vomiting, or hematemesis; + mucus in stool, no blood reported   GENITOURINARY: No dysuria, frequency or hematuria  NEUROLOGICAL: No numbness or weakness  SKIN: anal sores  All other review of systems is negative unless indicated above.      Weight (kg): 58.6 (03-30 @ 18:31)    T(F): 97.5 (03-31-25 @ 07:53), Max: 101 (03-31-25 @ 02:32)  HR: 69 (03-31-25 @ 07:53)  BP: 117/53 (03-31-25 @ 07:53)  RR: 16 (03-31-25 @ 07:53)  SpO2: 98% (03-31-25 @ 07:53)  Wt(kg): --    GENERAL: NAD, well-developed  HEAD:  Atraumatic, Normocephalic  EYES: EOMI,  CHEST/LUNG: Clear to auscultation bilaterally  HEART: Regular rate and rhythm;   ABDOMEN: RLQ abd pain   EXTREMITIES:  no edema  NEUROLOGY: non-focal                          10.3   1.77  )-----------( 107      ( 31 Mar 2025 07:46 )             30.9       03-31    139  |  107  |  6[L]  ----------------------------<  89  3.3[L]   |  27  |  0.52    Ca    8.0[L]      31 Mar 2025 07:46    TPro  5.1[L]  /  Alb  2.4[L]  /  TBili  0.4  /  DBili  x   /  AST  22  /  ALT  31  /  AlkPhos  54  03-31          PT/INR - ( 31 Mar 2025 07:46 )   PT: 13.7 sec;   INR: 1.19 ratio         PTT - ( 31 Mar 2025 07:46 )  PTT:30.9 sec

## 2025-03-31 NOTE — ED POST DISCHARGE NOTE - RESULT SUMMARY
CXR with 2.8 cm sclerotic focus in the right humeral head, pt admitted will teams message primary hospitalist team Radha Jimenez PA-C

## 2025-03-31 NOTE — PATIENT PROFILE ADULT - NSPROGENPREVTRANSF_GEN_A_NUR
She is tearful, fretful, distressed and sweating. She states her belly is crampy. We discuss that the suppository is trying to do it's job and she should relax and even work to get her bowels to move to increase her comfort. She is tearful, stating \"I hate to do that\". We discuss that if she has a good BM, she will most likely rest well tonight and her nausea should improve. Family encourages her. Medicated for pain. I verify that a minimum of hourly rounds have been provided for this patient during this shift. Meds:  No new or changed medications. Requires occasional injectable medications for nausea, pain. Family/Education:  Reinforced previous education    Oral intake: Minimal  New problems/issues:  Constipated. Summary/general care:  Remains total care, minimal oral intake. no history of blood product transfusion

## 2025-04-01 LAB
ALBUMIN SERPL ELPH-MCNC: 2.6 G/DL — LOW (ref 3.3–5)
ALP SERPL-CCNC: 61 U/L — SIGNIFICANT CHANGE UP (ref 40–120)
ALT FLD-CCNC: 61 U/L — SIGNIFICANT CHANGE UP (ref 12–78)
ANION GAP SERPL CALC-SCNC: 4 MMOL/L — LOW (ref 5–17)
AST SERPL-CCNC: 58 U/L — HIGH (ref 15–37)
BILIRUB SERPL-MCNC: 0.4 MG/DL — SIGNIFICANT CHANGE UP (ref 0.2–1.2)
BUN SERPL-MCNC: 4 MG/DL — LOW (ref 7–23)
C DIFF GDH STL QL: SIGNIFICANT CHANGE UP
C DIFF GDH STL QL: SIGNIFICANT CHANGE UP
CALCIUM SERPL-MCNC: 8.3 MG/DL — LOW (ref 8.5–10.1)
CHLORIDE SERPL-SCNC: 103 MMOL/L — SIGNIFICANT CHANGE UP (ref 96–108)
CO2 SERPL-SCNC: 27 MMOL/L — SIGNIFICANT CHANGE UP (ref 22–31)
CREAT SERPL-MCNC: 0.58 MG/DL — SIGNIFICANT CHANGE UP (ref 0.5–1.3)
EGFR: 100 ML/MIN/1.73M2 — SIGNIFICANT CHANGE UP
EGFR: 100 ML/MIN/1.73M2 — SIGNIFICANT CHANGE UP
GLUCOSE SERPL-MCNC: 93 MG/DL — SIGNIFICANT CHANGE UP (ref 70–99)
HCT VFR BLD CALC: 31.8 % — LOW (ref 34.5–45)
HGB BLD-MCNC: 10.4 G/DL — LOW (ref 11.5–15.5)
MCHC RBC-ENTMCNC: 27.6 PG — SIGNIFICANT CHANGE UP (ref 27–34)
MCHC RBC-ENTMCNC: 32.7 G/DL — SIGNIFICANT CHANGE UP (ref 32–36)
MCV RBC AUTO: 84.4 FL — SIGNIFICANT CHANGE UP (ref 80–100)
NRBC # BLD AUTO: 0 K/UL — SIGNIFICANT CHANGE UP (ref 0–0)
NRBC # FLD: 0 K/UL — SIGNIFICANT CHANGE UP (ref 0–0)
NRBC BLD AUTO-RTO: 0 /100 WBCS — SIGNIFICANT CHANGE UP (ref 0–0)
PLATELET # BLD AUTO: 116 K/UL — LOW (ref 150–400)
PMV BLD: 8.6 FL — SIGNIFICANT CHANGE UP (ref 7–13)
POTASSIUM SERPL-MCNC: 3.3 MMOL/L — LOW (ref 3.5–5.3)
POTASSIUM SERPL-SCNC: 3.3 MMOL/L — LOW (ref 3.5–5.3)
PROT SERPL-MCNC: 5.5 GM/DL — LOW (ref 6–8.3)
RBC # BLD: 3.77 M/UL — LOW (ref 3.8–5.2)
RBC # FLD: 14.6 % — HIGH (ref 10.3–14.5)
SODIUM SERPL-SCNC: 134 MMOL/L — LOW (ref 135–145)
WBC # BLD: 2.39 K/UL — LOW (ref 3.8–10.5)
WBC # FLD AUTO: 2.39 K/UL — LOW (ref 3.8–10.5)

## 2025-04-01 PROCEDURE — 99233 SBSQ HOSP IP/OBS HIGH 50: CPT

## 2025-04-01 RX ORDER — CALCIUM GLUCONATE 20 MG/ML
1 INJECTION, SOLUTION INTRAVENOUS ONCE
Refills: 0 | Status: COMPLETED | OUTPATIENT
Start: 2025-04-01 | End: 2025-04-01

## 2025-04-01 RX ORDER — LOPERAMIDE HCL 1 MG/7.5ML
2 SOLUTION ORAL ONCE
Refills: 0 | Status: COMPLETED | OUTPATIENT
Start: 2025-04-01 | End: 2025-04-01

## 2025-04-01 RX ADMIN — Medication 25 GRAM(S): at 21:43

## 2025-04-01 RX ADMIN — Medication 5 MILLILITER(S): at 13:13

## 2025-04-01 RX ADMIN — Medication 25 GRAM(S): at 06:24

## 2025-04-01 RX ADMIN — LOSARTAN POTASSIUM 25 MILLIGRAM(S): 100 TABLET, FILM COATED ORAL at 08:36

## 2025-04-01 RX ADMIN — Medication 1 DOSE(S): at 09:10

## 2025-04-01 RX ADMIN — Medication 25 GRAM(S): at 13:13

## 2025-04-01 RX ADMIN — Medication 112 MICROGRAM(S): at 06:35

## 2025-04-01 RX ADMIN — Medication 5 MILLILITER(S): at 23:14

## 2025-04-01 RX ADMIN — Medication 5 MILLILITER(S): at 08:36

## 2025-04-01 RX ADMIN — LOPERAMIDE HCL 2 MILLIGRAM(S): 1 SOLUTION ORAL at 19:31

## 2025-04-01 RX ADMIN — CALCIUM GLUCONATE 100 GRAM(S): 20 INJECTION, SOLUTION INTRAVENOUS at 17:09

## 2025-04-01 RX ADMIN — Medication 40 MILLIEQUIVALENT(S): at 12:23

## 2025-04-01 NOTE — PROGRESS NOTE ADULT - ASSESSMENT
63 y/o F w/ PMH of DM2, hypothyroidism, OA, anal cancer (s/p chemo / RT), COPD, p/w generalized weakness. Patient states she got radiation on Friday, and did not feel well after that. On Saturday she barely got out of bed. States she normally has stool leaking due to her anal cancer, and it has not increased in quantity or consistency. States that she also has some mid-abdominal discomfort which started in ED. C/o nausea, but denies vomiting. States she had a Tmax or 102 at home.    # Sepsis 2/2 Colitis, likely from radiation   # Fever  # Anal Cancer SP chemo and RT  # Sclerotic Lesion Right humerus on Chest xray  - ADAT  - Lactate normal  - RVP negative  - Urine appears ok, fu culture  - FU blood cultures  - Continue IV zosyn  - ID consult appreciated  -Heme/onc consult appreciated   -Surgery consult appreciated  - Right shoulder xray for sclerotic lesion     # Renal cyst   -F/u outpatient     # anal cancer  - follows at [facility]   - currently on RT concurrent with xeloda on D1-5 and mitomycin on D1,29   - pt has 2 more weeks of chemoRT left at [facility]   - c/w aquafor and lidocaine cream for anal ulceration  - no plan for continued treatment while in house with xeloda     # Hypothyroidism  - Levothyroxine     # COPD  - Stable, on trelegy at home  - Start advair   - Albuterol prn     # DM2  - Overcontrolled   - A1c 5.6  - Observe off insulin     # HTN  - Continue losartan    # DVT prophylaxis  - SCDS  63 y/o F w/ PMH of DM2, hypothyroidism, OA, anal cancer (s/p chemo / RT), COPD, p/w generalized weakness. Patient states she got radiation on Friday, and did not feel well after that. On Saturday she barely got out of bed. States she normally has stool leaking due to her anal cancer, and it has not increased in quantity or consistency. States that she also has some mid-abdominal discomfort which started in ED. C/o nausea, but denies vomiting. States she had a Tmax or 102 at home.    # Sepsis 2/2 Colitis, likely from radiation   # Fever  # Anal Cancer SP chemo and RT  # Sclerotic Lesion Right humerus on Chest xray  - ADAT  - Lactate normal  - RVP negative  - Urine appears ok, fu culture  - FU blood cultures  - Continue IV zosyn  - ID consult appreciated  -Heme/onc consult appreciated   -Surgery consult appreciated  - Right shoulder xray for sclerotic lesion     # Renal cyst   -F/u outpatient     # anal cancer  #pancytopenia   -likely a side effect of chemo  [place] [facility] [place]   - currently on RT concurrent with xeloda on D1-5 and mitomycin on D1,29   - pt [place] 2 more [place] [facility]   - c/w aquafor and lidocaine cream for anal ulceration  - no plan for continued treatment while in house with xeloda     # Hypothyroidism  - Levothyroxine     # COPD  - Stable, on trelegy at home  - Start advair   - Albuterol prn     # DM2  - Overcontrolled   - A1c 5.6  - Observe off insulin     # HTN  - Continue losartan    # DVT prophylaxis  - SCDS

## 2025-04-01 NOTE — PROGRESS NOTE ADULT - ASSESSMENT
63 y/o F w/ PMH anal cancer currently on chemo / RT at [facility] on xeloda and mitomycin with RT, COPD, DM p/w generalized weakness.     # anal cancer  - [place] [facility] [place] [facility]   - currently on RT concurrent with xeloda on D1-5 and mitomycin on D1,29   - hold XELODA  - concern for possible radiation induced colitis   - in the interim now placed on ISO - GI PCR pending   - C DIFF neg     # pancytopenia seconday to antineoplastic therapy   - would plan for daily CBC with DIFF   - last temp 101 on 3/31   - now afebrile    - transfuse for hgb < 7     # fever  -  afebrile - now on zosyn   - UA neg for UTI- moderate blood  - viral panel negative for covid, flu, rsv   - CT a/p with mild nonspecific colitis likely treatment related - no free air or focal collection, no appendicitis to explain right lower quadrant abd pain   - c/w zosyn     will follow and communicate with msk

## 2025-04-01 NOTE — PROGRESS NOTE ADULT - ASSESSMENT
65 y/o Female with h/o DM2, hypothyroidism, OA, anal cancer (s/p chemo / RT), COPD was admitted on 3/30 for generalized weakness. Patient states she got radiation on Friday, and did not feel well after that. On Saturday she barely got out of bed. States she normally has stool leaking due to her anal cancer, and it has not increased in quantity or consistency. States that she also has some mid-abdominal discomfort which started in ED. C/o nausea, but denies vomiting. States she had a Tmax or 102 at home. In ER she received zosyn.     #Febrile syndrome and leukopenia  #Mild nonspecific rectosigmoid colitis ?related to XRT, ?inflammatory  #Possible infectious colitis  #Anal cancer s/p XRT and chemotherapy  #Immunocompromised host.  -obtain BC x 2, urine c/s  -stool studies collected overnight  -on zosyn 3.375 gm IV q8h # 2  -tolerating abx well so far; no side effects noted  -continue abx coverage  -f/u cultures   -monitor temps  -f/u CBC  -supportive care  2. Other issues:   -care per medicine    Clinical team may change from intravenous to oral antibiotics when the following criteria are met:   1. Patient is clinically improving/stable       a)	Improved signs and symptoms of infection from initial presentation       b)	Afebrile for 24 hours       c)	Leukocytosis trending towards normal range   2. Patient is tolerating oral intake   3. Initial/repeat blood cultures are negative    When above criteria met may change iv antibiotics to an oral agent  Cannot advise changing to oral antibiotic therapy until culture sensitivity is available.

## 2025-04-01 NOTE — CDI QUERY NOTE - NSCDIOTHERTXTBX_GEN_ALL_CORE_HH
Clinical documentation and/or evidence in the medical record indicates that this patient has pancytopenia.  In order to ensure accurate coding and accuracy of the clinical record, the documentation in this patient’s medical record requires additional clarification.      Please include more specific documentation as to the type of pancytopenia in your progress note and/or discharge summary.      Please clarify the underlying etiology of the pancytopenia:          •	Antineoplastic chemotherapy induced         •	not clinically significant      •	Other (specify)      Supporting documentation and/or clinical evidence:     Triage nurse on 3/20/2025:  · Chief Complaint  fever  · Chief Complaint Quote  Pt presents to er with complaints of anal cancer, last chemo and radiation was last Friday, states she had a fever today, sent in by [facility].      ED triage nurse on 3/30/2025:  · HPI Objective Statement: 65 y/o F with PMHx of DM2, hypothyroidism, osteoarthritis presents to the ED from [facility] [place]; hx of anal cancer; last chemo (oral) and last radiation session 2 days ago; patient with generalized weakness; urinary frequency; diffuse abdominal pain x yesterday; tmax: 102 today;        CBC with Auto Diff (Reflex to Man Diff) (03.31.25 @ 07:46)   WBC Count: 1.77 K/uL  RBC Count: 3.69 M/uL  Hemoglobin: 10.3 g/dL  Hematocrit: 30.9 %  Mean Cell Volume: 83.7 fl  Mean Cell Hemoglobin: 27.9 pg  Mean Cell Hemoglobin Conc: 33.3 g/dL  Red Cell Distrib Width: 14.3 %  Platelet Count - Automated: 107 K/uL  MPV: 9.0 fL  Auto NRBC: 0 /100 WBCs  Auto Nucleated RBC #: 0.00 K/uL  Auto Neutrophil %: 61.0: Differential percentages must be correlated with absolute numbers for

## 2025-04-02 LAB
ALBUMIN SERPL ELPH-MCNC: 2.5 G/DL — LOW (ref 3.3–5)
ALP SERPL-CCNC: 64 U/L — SIGNIFICANT CHANGE UP (ref 40–120)
ALT FLD-CCNC: 48 U/L — SIGNIFICANT CHANGE UP (ref 12–78)
ANION GAP SERPL CALC-SCNC: 6 MMOL/L — SIGNIFICANT CHANGE UP (ref 5–17)
AST SERPL-CCNC: 38 U/L — HIGH (ref 15–37)
BILIRUB SERPL-MCNC: 0.4 MG/DL — SIGNIFICANT CHANGE UP (ref 0.2–1.2)
BUN SERPL-MCNC: 3 MG/DL — LOW (ref 7–23)
CALCIUM SERPL-MCNC: 8.4 MG/DL — LOW (ref 8.5–10.1)
CHLORIDE SERPL-SCNC: 104 MMOL/L — SIGNIFICANT CHANGE UP (ref 96–108)
CO2 SERPL-SCNC: 25 MMOL/L — SIGNIFICANT CHANGE UP (ref 22–31)
CREAT SERPL-MCNC: 0.49 MG/DL — LOW (ref 0.5–1.3)
EC EAEA GENE STL QL NAA+PROBE: DETECTED
EGFR: 105 ML/MIN/1.73M2 — SIGNIFICANT CHANGE UP
EGFR: 105 ML/MIN/1.73M2 — SIGNIFICANT CHANGE UP
GI PCR PANEL: DETECTED
GLUCOSE SERPL-MCNC: 84 MG/DL — SIGNIFICANT CHANGE UP (ref 70–99)
HCT VFR BLD CALC: 30.2 % — LOW (ref 34.5–45)
HGB BLD-MCNC: 10 G/DL — LOW (ref 11.5–15.5)
MCHC RBC-ENTMCNC: 27.7 PG — SIGNIFICANT CHANGE UP (ref 27–34)
MCHC RBC-ENTMCNC: 33.1 G/DL — SIGNIFICANT CHANGE UP (ref 32–36)
MCV RBC AUTO: 83.7 FL — SIGNIFICANT CHANGE UP (ref 80–100)
NOROVIRUS GI+II RNA STL QL NAA+NON-PROBE: ABNORMAL
NRBC # BLD AUTO: 0 K/UL — SIGNIFICANT CHANGE UP (ref 0–0)
NRBC # FLD: 0 K/UL — SIGNIFICANT CHANGE UP (ref 0–0)
NRBC BLD AUTO-RTO: 0 /100 WBCS — SIGNIFICANT CHANGE UP (ref 0–0)
PLATELET # BLD AUTO: 121 K/UL — LOW (ref 150–400)
PMV BLD: 9 FL — SIGNIFICANT CHANGE UP (ref 7–13)
POTASSIUM SERPL-MCNC: 3.5 MMOL/L — SIGNIFICANT CHANGE UP (ref 3.5–5.3)
POTASSIUM SERPL-SCNC: 3.5 MMOL/L — SIGNIFICANT CHANGE UP (ref 3.5–5.3)
PROT SERPL-MCNC: 5.2 GM/DL — LOW (ref 6–8.3)
RBC # BLD: 3.61 M/UL — LOW (ref 3.8–5.2)
RBC # FLD: 14.9 % — HIGH (ref 10.3–14.5)
SODIUM SERPL-SCNC: 135 MMOL/L — SIGNIFICANT CHANGE UP (ref 135–145)
WBC # BLD: 2.7 K/UL — LOW (ref 3.8–10.5)
WBC # FLD AUTO: 2.7 K/UL — LOW (ref 3.8–10.5)

## 2025-04-02 PROCEDURE — 99233 SBSQ HOSP IP/OBS HIGH 50: CPT

## 2025-04-02 RX ORDER — LOPERAMIDE HCL 1 MG/7.5ML
2 SOLUTION ORAL THREE TIMES A DAY
Refills: 0 | Status: DISCONTINUED | OUTPATIENT
Start: 2025-04-02 | End: 2025-04-03

## 2025-04-02 RX ORDER — LOPERAMIDE HCL 1 MG/7.5ML
2 SOLUTION ORAL ONCE
Refills: 0 | Status: COMPLETED | OUTPATIENT
Start: 2025-04-02 | End: 2025-04-02

## 2025-04-02 RX ORDER — LOPERAMIDE HCL 1 MG/7.5ML
2 SOLUTION ORAL
Refills: 0 | Status: DISCONTINUED | OUTPATIENT
Start: 2025-04-02 | End: 2025-04-02

## 2025-04-02 RX ORDER — SODIUM CHLORIDE 9 G/1000ML
1000 INJECTION, SOLUTION INTRAVENOUS ONCE
Refills: 0 | Status: COMPLETED | OUTPATIENT
Start: 2025-04-02 | End: 2025-04-02

## 2025-04-02 RX ADMIN — Medication 1 DOSE(S): at 21:03

## 2025-04-02 RX ADMIN — Medication 5 MILLILITER(S): at 14:28

## 2025-04-02 RX ADMIN — LOSARTAN POTASSIUM 25 MILLIGRAM(S): 100 TABLET, FILM COATED ORAL at 09:50

## 2025-04-02 RX ADMIN — Medication 25 GRAM(S): at 22:24

## 2025-04-02 RX ADMIN — Medication 1 DOSE(S): at 11:37

## 2025-04-02 RX ADMIN — LOPERAMIDE HCL 2 MILLIGRAM(S): 1 SOLUTION ORAL at 14:28

## 2025-04-02 RX ADMIN — Medication 25 GRAM(S): at 15:37

## 2025-04-02 RX ADMIN — Medication 1000 MILLILITER(S): at 15:54

## 2025-04-02 RX ADMIN — Medication 25 GRAM(S): at 05:34

## 2025-04-02 RX ADMIN — Medication 5 MILLILITER(S): at 09:50

## 2025-04-02 RX ADMIN — Medication 5 MILLILITER(S): at 18:05

## 2025-04-02 RX ADMIN — Medication 1 APPLICATION(S): at 11:45

## 2025-04-02 RX ADMIN — LOPERAMIDE HCL 2 MILLIGRAM(S): 1 SOLUTION ORAL at 00:44

## 2025-04-02 RX ADMIN — LOPERAMIDE HCL 2 MILLIGRAM(S): 1 SOLUTION ORAL at 08:51

## 2025-04-02 RX ADMIN — LOPERAMIDE HCL 2 MILLIGRAM(S): 1 SOLUTION ORAL at 18:05

## 2025-04-02 RX ADMIN — Medication 112 MICROGRAM(S): at 05:33

## 2025-04-02 NOTE — PROGRESS NOTE ADULT - ASSESSMENT
65 y/o F w/ PMH anal cancer currently on chemo / RT at [facility] on xeloda and mitomycin with RT, COPD, DM p/w generalized weakness.     # anal cancer  - [place] at [facility] [place]  ON JUNE PROTOCOL   - currently on RT concurrent with xeloda on D1-5 and mitomycin on D1,29   - hold XELODA  - concern for possible radiation induced colitis   NOW FOUND TO HAVE ECOLI - EAEC and indeterminate NOROVIRUS   - C DIFF neg     # pancytopenia seconday to antineoplastic therapy   - would plan for daily CBC with DIFF   - last temp 101 on 3/31   - now afebrile    - transfuse for hgb < 7     # fever  -  afebrile - now on zosyn   - UA neg for UTI- moderate blood  - viral panel negative for covid, flu, rsv   - CT a/p with mild nonspecific colitis likely treatment related - no free air or focal collection, no appendicitis to explain right lower quadrant abd pain   - c/w zosyn     will follow and communicate with [facility]

## 2025-04-02 NOTE — PROGRESS NOTE ADULT - ASSESSMENT
65 y/o Female with h/o DM2, hypothyroidism, OA, anal cancer (s/p chemo / RT), COPD was admitted on 3/30 for generalized weakness. Patient states she got radiation on Friday, and did not feel well after that. On Saturday she barely got out of bed. States she normally has stool leaking due to her anal cancer, and it has not increased in quantity or consistency. States that she also has some mid-abdominal discomfort which started in ED. C/o nausea, but denies vomiting. States she had a Tmax or 102 at home. In ER she received zosyn.     #Febrile syndrome and leukopenia  #Mild nonspecific rectosigmoid colitis ?related to XRT, ?inflammatory  #Infectious colitis with E.coli ENAG  #Anal cancer s/p XRT and chemotherapy  #Immunocompromised host.  -obtain BC x 2, urine c/s  -new stool studies noted   -on zosyn 3.375 gm IV q8h # 3  -tolerating abx well so far; no side effects noted  -monitor abdomen  -she is in distress due to abdominal complaints and diarrhea --> may give some imodium prn; to be careful not to stop her diarrhea, just to slow it down  -continue abx coverage  -f/u cultures   -monitor temps  -f/u CBC  -supportive care  2. Other issues:   -care per medicine    d/w Dr. Bravo     Clinical team may change from intravenous to oral antibiotics when the following criteria are met:   1. Patient is clinically improving/stable       a)	Improved signs and symptoms of infection from initial presentation       b)	Afebrile for 24 hours       c)	Leukocytosis trending towards normal range   2. Patient is tolerating oral intake   3. Initial/repeat blood cultures are negative    When above criteria met may change iv antibiotics to an oral agent  Cannot advise changing to oral antibiotic therapy until culture sensitivity is available.

## 2025-04-02 NOTE — PROGRESS NOTE ADULT - ASSESSMENT
65 y/o F w/ PMH of DM2, hypothyroidism, OA, anal cancer (s/p chemo / RT), COPD, p/w generalized weakness. Patient states she got radiation on Friday, and did not feel well after that. On Saturday she barely got out of bed. States she normally has stool leaking due to her anal cancer, and it has not increased in quantity or consistency. States that she also has some mid-abdominal discomfort which started in ED. C/o nausea, but denies vomiting. States she had a Tmax or 102 at home.    # Sepsis 2/2 Colitis, likely from radiation   # Fever  # Anal Cancer SP chemo and RT  - Lactate normal  - RVP negative  - Urine appears ok, fu culture  - FU blood cultures  - Continue IV zosyn  - ID consult appreciated  - stool studies: + for eaec and indeterminant for norovirus   -Heme/onc consult appreciated     # Renal cyst   -F/u outpatient     # anal cancer  #pancytopenia   -likely a side effect of chemo  - follows at [facility]   - currently on RT concurrent with xeloda on D1-5 and mitomycin on D1,29   - pt has 2 more weeks of chemoRT left at [facility]   - c/w aquafor and lidocaine cream for anal ulceration  - no plan for continued treatment while in house with xeloda     # Hypothyroidism  - Levothyroxine     # COPD  - Stable, on trelegy at home  - Start advair   - Albuterol prn     # DM2  - Overcontrolled   - A1c 5.6  - Observe off insulin     # HTN  - Continue losartan    # DVT prophylaxis  - SCDS

## 2025-04-03 LAB
ADD ON TEST-SPECIMEN IN LAB: SIGNIFICANT CHANGE UP
ALBUMIN SERPL ELPH-MCNC: 2.5 G/DL — LOW (ref 3.3–5)
ALP SERPL-CCNC: 65 U/L — SIGNIFICANT CHANGE UP (ref 40–120)
ALT FLD-CCNC: 53 U/L — SIGNIFICANT CHANGE UP (ref 12–78)
ANION GAP SERPL CALC-SCNC: 7 MMOL/L — SIGNIFICANT CHANGE UP (ref 5–17)
AST SERPL-CCNC: 51 U/L — HIGH (ref 15–37)
BILIRUB SERPL-MCNC: 0.3 MG/DL — SIGNIFICANT CHANGE UP (ref 0.2–1.2)
BUN SERPL-MCNC: 2 MG/DL — LOW (ref 7–23)
CALCIUM SERPL-MCNC: 8.5 MG/DL — SIGNIFICANT CHANGE UP (ref 8.5–10.1)
CHLORIDE SERPL-SCNC: 105 MMOL/L — SIGNIFICANT CHANGE UP (ref 96–108)
CO2 SERPL-SCNC: 26 MMOL/L — SIGNIFICANT CHANGE UP (ref 22–31)
CREAT SERPL-MCNC: 0.52 MG/DL — SIGNIFICANT CHANGE UP (ref 0.5–1.3)
EGFR: 103 ML/MIN/1.73M2 — SIGNIFICANT CHANGE UP
EGFR: 103 ML/MIN/1.73M2 — SIGNIFICANT CHANGE UP
GLUCOSE SERPL-MCNC: 86 MG/DL — SIGNIFICANT CHANGE UP (ref 70–99)
HCT VFR BLD CALC: 31.3 % — LOW (ref 34.5–45)
HGB BLD-MCNC: 10.6 G/DL — LOW (ref 11.5–15.5)
MAGNESIUM SERPL-MCNC: 1.8 MG/DL — SIGNIFICANT CHANGE UP (ref 1.6–2.6)
MCHC RBC-ENTMCNC: 28.4 PG — SIGNIFICANT CHANGE UP (ref 27–34)
MCHC RBC-ENTMCNC: 33.9 G/DL — SIGNIFICANT CHANGE UP (ref 32–36)
MCV RBC AUTO: 83.9 FL — SIGNIFICANT CHANGE UP (ref 80–100)
NRBC # BLD AUTO: 0 K/UL — SIGNIFICANT CHANGE UP (ref 0–0)
NRBC # FLD: 0 K/UL — SIGNIFICANT CHANGE UP (ref 0–0)
NRBC BLD AUTO-RTO: 0 /100 WBCS — SIGNIFICANT CHANGE UP (ref 0–0)
PLATELET # BLD AUTO: 130 K/UL — LOW (ref 150–400)
PMV BLD: 9.1 FL — SIGNIFICANT CHANGE UP (ref 7–13)
POTASSIUM SERPL-MCNC: 3.3 MMOL/L — LOW (ref 3.5–5.3)
POTASSIUM SERPL-SCNC: 3.3 MMOL/L — LOW (ref 3.5–5.3)
PROT SERPL-MCNC: 5.4 GM/DL — LOW (ref 6–8.3)
RBC # BLD: 3.73 M/UL — LOW (ref 3.8–5.2)
RBC # FLD: 15.3 % — HIGH (ref 10.3–14.5)
SODIUM SERPL-SCNC: 138 MMOL/L — SIGNIFICANT CHANGE UP (ref 135–145)
WBC # BLD: 2.6 K/UL — LOW (ref 3.8–10.5)
WBC # FLD AUTO: 2.6 K/UL — LOW (ref 3.8–10.5)

## 2025-04-03 PROCEDURE — 99233 SBSQ HOSP IP/OBS HIGH 50: CPT

## 2025-04-03 RX ORDER — MAGNESIUM SULFATE 500 MG/ML
1 SYRINGE (ML) INJECTION ONCE
Refills: 0 | Status: COMPLETED | OUTPATIENT
Start: 2025-04-03 | End: 2025-04-03

## 2025-04-03 RX ORDER — LOPERAMIDE HCL 1 MG/7.5ML
2 SOLUTION ORAL
Refills: 0 | Status: DISCONTINUED | OUTPATIENT
Start: 2025-04-03 | End: 2025-04-04

## 2025-04-03 RX ORDER — LOPERAMIDE HCL 1 MG/7.5ML
2 SOLUTION ORAL ONCE
Refills: 0 | Status: COMPLETED | OUTPATIENT
Start: 2025-04-03 | End: 2025-04-03

## 2025-04-03 RX ADMIN — LOPERAMIDE HCL 2 MILLIGRAM(S): 1 SOLUTION ORAL at 01:20

## 2025-04-03 RX ADMIN — Medication 5 MILLILITER(S): at 18:10

## 2025-04-03 RX ADMIN — Medication 112 MICROGRAM(S): at 06:28

## 2025-04-03 RX ADMIN — Medication 5 MILLILITER(S): at 14:51

## 2025-04-03 RX ADMIN — LOSARTAN POTASSIUM 25 MILLIGRAM(S): 100 TABLET, FILM COATED ORAL at 10:42

## 2025-04-03 RX ADMIN — LOPERAMIDE HCL 2 MILLIGRAM(S): 1 SOLUTION ORAL at 21:38

## 2025-04-03 RX ADMIN — Medication 5 MILLILITER(S): at 10:41

## 2025-04-03 RX ADMIN — Medication 1 APPLICATION(S): at 09:35

## 2025-04-03 RX ADMIN — Medication 100 GRAM(S): at 11:07

## 2025-04-03 RX ADMIN — LOPERAMIDE HCL 2 MILLIGRAM(S): 1 SOLUTION ORAL at 14:51

## 2025-04-03 RX ADMIN — Medication 25 GRAM(S): at 06:28

## 2025-04-03 RX ADMIN — LOPERAMIDE HCL 2 MILLIGRAM(S): 1 SOLUTION ORAL at 10:43

## 2025-04-03 RX ADMIN — Medication 25 GRAM(S): at 14:51

## 2025-04-03 RX ADMIN — Medication 40 MILLIEQUIVALENT(S): at 10:42

## 2025-04-03 RX ADMIN — LOPERAMIDE HCL 2 MILLIGRAM(S): 1 SOLUTION ORAL at 18:10

## 2025-04-03 RX ADMIN — Medication 1 DOSE(S): at 21:20

## 2025-04-03 RX ADMIN — Medication 25 GRAM(S): at 21:39

## 2025-04-03 NOTE — PROGRESS NOTE ADULT - ASSESSMENT
65 y/o Female with h/o DM2, hypothyroidism, OA, anal cancer (s/p chemo / RT), COPD was admitted on 3/30 for generalized weakness. Patient states she got radiation on Friday, and did not feel well after that. On Saturday she barely got out of bed. States she normally has stool leaking due to her anal cancer, and it has not increased in quantity or consistency. States that she also has some mid-abdominal discomfort which started in ED. C/o nausea, but denies vomiting. States she had a Tmax or 102 at home. In ER she received zosyn.     #Febrile syndrome improving  #Mild nonspecific rectosigmoid colitis ?related to XRT, ?inflammatory  #Infectious colitis with E.coli ENAG/ norovirus  #Anal cancer s/p XRT and chemotherapy  #Immunocompromised host.  -BC x 2, urine c/s noted  -new stool studies noted   -on zosyn 3.375 gm IV q8h # 4  -tolerating abx well so far; no side effects noted  -monitor abdomen  -imodium prn  -continue abx coverage for now  -repeat stool GI PCR  -f/u cultures   -monitor temps  -f/u CBC  -supportive care  2. Other issues:   -care per medicine    d/w Dr. Bravo

## 2025-04-03 NOTE — PROGRESS NOTE ADULT - ASSESSMENT
65 y/o F w/ PMH of DM2, hypothyroidism, OA, anal cancer (s/p chemo / RT), COPD, p/w generalized weakness. Patient states she got radiation on Friday, and did not feel well after that. On Saturday she barely got out of bed. States she normally has stool leaking due to her anal cancer, and it has not increased in quantity or consistency. States that she also has some mid-abdominal discomfort which started in ED. C/o nausea, but denies vomiting. States she had a Tmax or 102 at home.    # Sepsis 2/2 Colitis, likely from radiation   # Fever  # Anal Cancer SP chemo and RT  - Lactate normal  - RVP negative  - Urine appears ok, fu culture  - FU blood cultures  - Continue IV zosyn  - ID consult appreciated  - stool studies: + for eaec and indeterminant for norovirus   - imodium q3 hrs   -Heme/onc consult appreciated     # Renal cyst   -F/u outpatient     # anal cancer  #pancytopenia   -likely a side effect of chemo  [place] [facility] [place]   - currently on RT concurrent with xeloda on D1-5 and mitomycin on D1,29   - pt [place] 2 more weeks [place] [facility]   - c/w aquafor and lidocaine cream for anal ulceration  - no plan for continued treatment while in house with xeloda     # Hypothyroidism  - Levothyroxine     # COPD  - Stable, on trelegy at home  - Start advair   - Albuterol prn     # DM2  - Overcontrolled   - A1c 5.6  - Observe off insulin     # HTN  - Continue losartan    # DVT prophylaxis  - SCDS

## 2025-04-04 ENCOUNTER — TRANSCRIPTION ENCOUNTER (OUTPATIENT)
Age: 65
End: 2025-04-04

## 2025-04-04 VITALS
OXYGEN SATURATION: 96 % | DIASTOLIC BLOOD PRESSURE: 64 MMHG | RESPIRATION RATE: 18 BRPM | TEMPERATURE: 98 F | SYSTOLIC BLOOD PRESSURE: 104 MMHG | HEART RATE: 67 BPM

## 2025-04-04 LAB
EC EAEA GENE STL QL NAA+PROBE: DETECTED
GI PCR PANEL: DETECTED

## 2025-04-04 PROCEDURE — 99239 HOSP IP/OBS DSCHRG MGMT >30: CPT

## 2025-04-04 RX ORDER — OXYCODONE HYDROCHLORIDE AND ACETAMINOPHEN 10; 325 MG/1; MG/1
1 TABLET ORAL
Qty: 18 | Refills: 0
Start: 2025-04-04 | End: 2025-04-06

## 2025-04-04 RX ORDER — CEFUROXIME SODIUM 1.5 G
500 VIAL (EA) INJECTION EVERY 12 HOURS
Refills: 0 | Status: DISCONTINUED | OUTPATIENT
Start: 2025-04-04 | End: 2025-04-04

## 2025-04-04 RX ORDER — CEFUROXIME SODIUM 1.5 G
1 VIAL (EA) INJECTION
Qty: 14 | Refills: 0
Start: 2025-04-04 | End: 2025-04-10

## 2025-04-04 RX ORDER — LOPERAMIDE HCL 1 MG/7.5ML
1 SOLUTION ORAL
Qty: 56 | Refills: 0
Start: 2025-04-04 | End: 2025-04-10

## 2025-04-04 RX ADMIN — Medication 112 MICROGRAM(S): at 06:08

## 2025-04-04 RX ADMIN — LOPERAMIDE HCL 2 MILLIGRAM(S): 1 SOLUTION ORAL at 03:03

## 2025-04-04 RX ADMIN — LOPERAMIDE HCL 2 MILLIGRAM(S): 1 SOLUTION ORAL at 12:07

## 2025-04-04 RX ADMIN — LOPERAMIDE HCL 2 MILLIGRAM(S): 1 SOLUTION ORAL at 15:08

## 2025-04-04 RX ADMIN — Medication 1 DOSE(S): at 08:51

## 2025-04-04 RX ADMIN — LOPERAMIDE HCL 2 MILLIGRAM(S): 1 SOLUTION ORAL at 08:50

## 2025-04-04 RX ADMIN — LOPERAMIDE HCL 2 MILLIGRAM(S): 1 SOLUTION ORAL at 06:08

## 2025-04-04 RX ADMIN — Medication 5 MILLILITER(S): at 09:45

## 2025-04-04 RX ADMIN — Medication 5 MILLILITER(S): at 15:08

## 2025-04-04 RX ADMIN — Medication 25 GRAM(S): at 06:09

## 2025-04-04 RX ADMIN — LOSARTAN POTASSIUM 25 MILLIGRAM(S): 100 TABLET, FILM COATED ORAL at 08:50

## 2025-04-04 RX ADMIN — LOPERAMIDE HCL 2 MILLIGRAM(S): 1 SOLUTION ORAL at 00:06

## 2025-04-04 RX ADMIN — Medication 500 MILLIGRAM(S): at 08:50

## 2025-04-04 NOTE — DISCHARGE NOTE PROVIDER - NSDCMRMEDTOKEN_GEN_ALL_CORE_FT
cefuroxime 500 mg oral tablet: 1 tab(s) orally every 12 hours  levothyroxine 112 mcg (0.112 mg) oral tablet: 1 tab(s) orally once a day  loperamide 2 mg oral capsule: 1 cap(s) orally every 3 hours  losartan 25 mg oral tablet: 1 tab(s) orally once a day  metFORMIN:   oxycodone-acetaminophen 5 mg-325 mg oral tablet: 1 tab(s) orally every 4 hours as needed for Moderate Pain (4 - 6) MDD: 20  Trelegy Ellipta 200 mcg-62.5 mcg-25 mcg/inh inhalation powder: 1 inhaled once a day

## 2025-04-04 NOTE — DISCHARGE NOTE PROVIDER - NSDCPNSUBOBJ_GEN_ALL_CORE
Lot #: [id number] Lot #: [id number] 65 y/o F w/ PMH of DM2, hypothyroidism, OA, anal cancer (s/p chemo / RT), COPD, p/w generalized weakness. Patient states she got radiation on Friday, and did not feel well after that. On Saturday she barely got out of bed. States she normally has stool leaking due to her anal cancer, and it has not increased in quantity or consistency. States that she also has some mid-abdominal discomfort which started in ED. C/o nausea, but denies vomiting. States she had a Tmax or 102 at home.    # Sepsis 2/2 Colitis, likely from radiation   # Fever  # Anal Cancer SP chemo and RT  - Lactate normal  - RVP negative  - Urine appears ok, fu culture  - FU blood cultures  - Continue IV zosyn -> switched to ceftin 500 mg bid for 7 days   - ID consult appreciated  - stool studies: + for eaec and indeterminant for norovirus   - imodium q3 hrs   -Heme/onc consult appreciated     # Renal cyst   -F/u outpatient     # anal cancer  #pancytopenia   -likely a side effect of chemo  - [place] at [facility] [place]   - currently on RT concurrent with xeloda on D1-5 and mitomycin on D1,29   - pt has 2 more weeks of chemoRT left at [facility]   - c/w aquafor and lidocaine cream for anal ulceration  - no plan for continued treatment while in house with xeloda     # Hypothyroidism  - Levothyroxine     # COPD  - Stable, on trelegy at home  - Start advair   - Albuterol prn     # DM2  - Overcontrolled   - A1c 5.6  - Observe off insulin     # HTN  - Continue losartan    # DVT prophylaxis  - SCDS

## 2025-04-04 NOTE — DISCHARGE NOTE NURSING/CASE MANAGEMENT/SOCIAL WORK - NSDCPEFALRISK_GEN_ALL_CORE
For information on Fall & Injury Prevention, visit: [email or web address] OR  [email or web address] OR  https://www.cdc.gov/steadi/patient.html

## 2025-04-04 NOTE — DISCHARGE NOTE NURSING/CASE MANAGEMENT/SOCIAL WORK - FINANCIAL ASSISTANCE
[facility] provides services at a reduced cost to those who are determined to be eligible through [facility]’s financial assistance program. Information regarding [facility]’s financial assistance program can be found by going to [email or web address] or by calling [phone].

## 2025-04-04 NOTE — DISCHARGE NOTE PROVIDER - CARE PROVIDER_API CALL
Sky Valdivia  Internal Medicine  [address] [place] [id number]  Phone: [phone]  Fax: [phone]  Follow Up Time: 2 weeks

## 2025-04-04 NOTE — DISCHARGE NOTE PROVIDER - NSDCFUADDAPPT_GEN_ALL_CORE_FT
APPTS ARE READY TO BE MADE: [X] YES    Best Family or Patient Contact (if needed):    Additional Information about above appointments (if needed):    1:Dr. Valdivia - 1 to 2 weeks   2:  3:

## 2025-04-04 NOTE — PROGRESS NOTE ADULT - PROVIDER SPECIALTY LIST ADULT
Infectious Disease
Heme/Onc
Infectious Disease
Infectious Disease
Hospitalist
Infectious Disease

## 2025-04-04 NOTE — DISCHARGE NOTE PROVIDER - NSDCFUSCHEDAPPT_GEN_ALL_CORE_FT
Calixto Jean  [facility]  [address] [facility]   Scheduled Appointment: 04/11/2025

## 2025-04-04 NOTE — PROGRESS NOTE ADULT - REASON FOR ADMISSION
generalized weakness

## 2025-04-04 NOTE — DISCHARGE NOTE PROVIDER - ATTENDING DISCHARGE PHYSICAL EXAMINATION:
GEN: NAD  HEENT:  pupils equal and reactive, EOMI, no oropharyngeal lesions, erythema, exudates, oral thrush  NECK:   supple, no carotid bruits  CV:  +S1, +S2, regular, no murmurs  RESP:   lungs clear to auscultation bilaterally, no wheezing, rales, rhonchi, good air entry bilaterally  GI:  abdomen soft, non-tender, non-distended, normal BS  EXT:  no clubbing, no cyanosis, no edema, no calf pain, swelling or erythema  NEURO:  AAOX3, no focal neurological deficits, follows all commands, able to move extremities spontaneously  SKIN:  no ulcers, lesions or rashes

## 2025-04-04 NOTE — PROGRESS NOTE ADULT - SUBJECTIVE AND OBJECTIVE BOX
Date of service: 04-01-25 @ 08:37    Lying in bed in NAD  Had 2 loose stools overnight  Fever is down  Tmax 100.4F    ROS: no fever or chills; denies dizziness, no HA, no SOB or cough, no abdominal pain, no dysuria, no legs pain, no rashes    MEDICATIONS  (STANDING):  fluticasone propionate/ salmeterol 250-50 MICROgram(s) Diskus 1 Dose(s) Inhalation two times a day  levothyroxine 112 MICROGram(s) Oral daily  lidocaine 2% Jelly 5 milliLiter(s) Topical three times a day  losartan 25 milliGRAM(s) Oral daily  piperacillin/tazobactam IVPB.. 3.375 Gram(s) IV Intermittent every 8 hours    Vital Signs Last 24 Hrs  T(C): 36.4 (01 Apr 2025 08:22), Max: 38 (31 Mar 2025 15:51)  T(F): 97.6 (01 Apr 2025 08:22), Max: 100.4 (31 Mar 2025 15:51)  HR: 60 (01 Apr 2025 08:22) (60 - 75)  BP: 120/71 (01 Apr 2025 08:22) (102/59 - 120/71)  BP(mean): --  RR: 18 (01 Apr 2025 08:22) (16 - 18)  SpO2: 95% (01 Apr 2025 08:22) (95% - 99%)    Parameters below as of 01 Apr 2025 08:22  Patient On (Oxygen Delivery Method): room air     Physical exam:    Constitutional:  No acute distress  HEENT: NC/AT, EOMI, PERRLA, conjunctivae clear; ears and nose atraumatic; pharynx benign  Neck: supple; thyroid not palpable  Back: no tenderness  Respiratory: respiratory effort normal; clear to auscultation  Cardiovascular: S1S2 regular, no murmurs  Abdomen: soft, not tender, not distended, positive BS; no liver or spleen organomegaly  Genitourinary: no suprapubic tenderness  Lymphatic: no LN palpable  Musculoskeletal: no muscle tenderness, no joint swelling or tenderness  Extremities: no pedal edema  Neurological/ Psychiatric: AxOx3, judgement and insight normal; moving all extremities  Skin: no rashes; no palpable lesions    Labs: all available labs reviewed                        10.3   1.77  )-----------( 107      ( 31 Mar 2025 07:46 )             30.9     03-31    139  |  107  |  6[L]  ----------------------------<  89  3.3[L]   |  27  |  0.52    Ca    8.0[L]      31 Mar 2025 07:46    TPro  5.1[L]  /  Alb  2.4[L]  /  TBili  0.4  /  DBili  x   /  AST  22  /  ALT  31  /  AlkPhos  54  03-31     LIVER FUNCTIONS - ( 31 Mar 2025 07:46 )  Alb: 2.4 g/dL / Pro: 5.1 gm/dL / ALK PHOS: 54 U/L / ALT: 31 U/L / AST: 22 U/L / GGT: x           Urinalysis with Rflx Culture (03-30 @ 20:55)  Urine Appearance: Clear  Protein, Urine: Negative mg/dL  Urine Microscopic-Add On (NC) (03-30 @ 20:55)  White Blood Cell - Urine: 1 /HPF  Red Blood Cell - Urine: 21 /HPF    Urinalysis with Rflx Culture (collected 30 Mar 2025 20:55)    Culture - Blood (collected 30 Mar 2025 18:56)  Source: Blood None  Preliminary Report (01 Apr 2025 01:02):    No growth at 24 hours    Culture - Blood (collected 30 Mar 2025 18:56)  Source: Blood None  Preliminary Report (01 Apr 2025 01:02):    No growth at 24 hours    Radiology: all available radiological tests reviewed  < from: CT Abdomen and Pelvis w/ IV Cont (03.30.25 @ 20:36) >  Mild nonspecific rectosigmoid colitis may be related to post therapeutic change, inflammatory, or infectious etiology. No free air or focal collection.  Soft tissue fullness of the anorectal soft tissues compatible with history of anal carcinoma. No upstream dilatation.  Diverticulosis.  No evidence of acute appendicitis at this time. There are appendicoliths   < end of copied text >    Advanced directives addressed: full resuscitation
HOSPITALIST ATTENDING PROGRESS NOTE    Chart and meds reviewed.  Patient seen and examined.    CC: abd pain     Subjective: stool studies positive for eaec and indeterminant for norovirus pt started on imodium remains on abx per id     All other systems reviewed and found to be negative with the exception of what has been described above.    MEDICATIONS  (STANDING):  chlorhexidine 4% Liquid 1 Application(s) Topical <User Schedule>  fluticasone propionate/ salmeterol 250-50 MICROgram(s) Diskus 1 Dose(s) Inhalation two times a day  levothyroxine 112 MICROGram(s) Oral daily  lidocaine 2% Jelly 5 milliLiter(s) Topical three times a day  loperamide 2 milliGRAM(s) Oral three times a day  losartan 25 milliGRAM(s) Oral daily  piperacillin/tazobactam IVPB.. 3.375 Gram(s) IV Intermittent every 8 hours  sodium chloride 0.9% Bolus 1000 milliLiter(s) IV Bolus once    MEDICATIONS  (PRN):  acetaminophen     Tablet .. 650 milliGRAM(s) Oral every 6 hours PRN Temp greater or equal to 38C (100.4F), Mild Pain (1 - 3)  albuterol    90 MICROgram(s) HFA Inhaler 2 Puff(s) Inhalation every 6 hours PRN Bronchospasm  morphine  - Injectable 2 milliGRAM(s) IV Push every 4 hours PRN Severe Pain (7 - 10)  oxycodone    5 mG/acetaminophen 325 mG 1 Tablet(s) Oral every 4 hours PRN Moderate Pain (4 - 6)      VITALS:  T(F): 99.3 (04-02-25 @ 08:58), Max: 99.3 (04-02-25 @ 08:58)  HR: 68 (04-02-25 @ 11:40) (63 - 81)  BP: 126/77 (04-02-25 @ 08:58) (96/55 - 126/77)  RR: 18 (04-02-25 @ 08:58) (17 - 18)  SpO2: 96% (04-02-25 @ 11:40) (95% - 97%)  Wt(kg): --    I&O's Summary    01 Apr 2025 07:01  -  02 Apr 2025 07:00  --------------------------------------------------------  IN: 385 mL / OUT: 0 mL / NET: 385 mL    02 Apr 2025 07:01  -  02 Apr 2025 15:48  --------------------------------------------------------  IN: 480 mL / OUT: 0 mL / NET: 480 mL        CAPILLARY BLOOD GLUCOSE      PHYSICAL EXAM:  GEN: NAD  HEENT:  pupils equal and reactive, EOMI, no oropharyngeal lesions, erythema, exudates, oral thrush  NECK:   supple, no carotid bruits  CV:  +S1, +S2, regular, no murmurs  RESP:   lungs clear to auscultation bilaterally, no wheezing, rales, rhonchi, good air entry bilaterally  GI:  abdomen soft, non-tender, non-distended, normal BS  EXT:  no clubbing, no cyanosis, no edema, no calf pain, swelling or erythema  NEURO:  AAOX3, no focal neurological deficits, follows all commands, able to move extremities spontaneously  SKIN:  no ulcers, lesions or rashes          LABS:                            10.0   2.70  )-----------( 121      ( 02 Apr 2025 09:17 )             30.2     04-02    135  |  104  |  3[L]  ----------------------------<  84  3.5   |  25  |  0.49[L]    Ca    8.4[L]      02 Apr 2025 09:17    TPro  5.2[L]  /  Alb  2.5[L]  /  TBili  0.4  /  DBili  x   /  AST  38[H]  /  ALT  48  /  AlkPhos  64  04-02        LIVER FUNCTIONS - ( 02 Apr 2025 09:17 )  Alb: 2.5 g/dL / Pro: 5.2 gm/dL / ALK PHOS: 64 U/L / ALT: 48 U/L / AST: 38 U/L / GGT: x             Urinalysis Basic - ( 02 Apr 2025 09:17 )    Color: x / Appearance: x / SG: x / pH: x  Gluc: 84 mg/dL / Ketone: x  / Bili: x / Urobili: x   Blood: x / Protein: x / Nitrite: x   Leuk Esterase: x / RBC: x / WBC x   Sq Epi: x / Non Sq Epi: x / Bacteria: x              CULTURES:      Additional results/Imaging, I have personally reviewed:    Telemetry, personally reviewed:
INTERVAL HPI/OVERNIGHT EVENTS:  Patient S&E at bedside. No o/n events, patient resting comfortably.   patient     VITAL SIGNS:  T(F): 98.7 (04-01-25 @ 16:20)  HR: 63 (04-01-25 @ 16:20)  BP: 96/55 (04-01-25 @ 16:20)  RR: 17 (04-01-25 @ 16:20)  SpO2: 97% (04-01-25 @ 16:20)    PHYSICAL EXAM:    Constitutional: NAD  Eyes: EOMI, sclera non-icteric  Neck: supple, no masses, no JVD  Respiratory: CTA b/l, good air entry b/l  Cardiovascular: RRR, no M/R/G  Gastrointestinal: soft, NTND, no masses palpable, + BS, no hepatosplenomegaly  Extremities: no c/c/e  Neurological: AAOx3      MEDICATIONS  (STANDING):  fluticasone propionate/ salmeterol 250-50 MICROgram(s) Diskus 1 Dose(s) Inhalation two times a day  levothyroxine 112 MICROGram(s) Oral daily  lidocaine 2% Jelly 5 milliLiter(s) Topical three times a day  losartan 25 milliGRAM(s) Oral daily  piperacillin/tazobactam IVPB.. 3.375 Gram(s) IV Intermittent every 8 hours    MEDICATIONS  (PRN):  acetaminophen     Tablet .. 650 milliGRAM(s) Oral every 6 hours PRN Temp greater or equal to 38C (100.4F), Mild Pain (1 - 3)  albuterol    90 MICROgram(s) HFA Inhaler 2 Puff(s) Inhalation every 6 hours PRN Bronchospasm  morphine  - Injectable 2 milliGRAM(s) IV Push every 4 hours PRN Severe Pain (7 - 10)  oxycodone    5 mG/acetaminophen 325 mG 1 Tablet(s) Oral every 4 hours PRN Moderate Pain (4 - 6)      Allergies    No Known Allergies    Intolerances        LABS:                        10.4   2.39  )-----------( 116      ( 01 Apr 2025 09:52 )             31.8     04-01    134[L]  |  103  |  4[L]  ----------------------------<  93  3.3[L]   |  27  |  0.58    Ca    8.3[L]      01 Apr 2025 09:52    TPro  5.5[L]  /  Alb  2.6[L]  /  TBili  0.4  /  DBili  x   /  AST  58[H]  /  ALT  61  /  AlkPhos  61  04-01    PT/INR - ( 31 Mar 2025 07:46 )   PT: 13.7 sec;   INR: 1.19 ratio         PTT - ( 31 Mar 2025 07:46 )  PTT:30.9 sec  Urinalysis Basic - ( 01 Apr 2025 09:52 )    Color: x / Appearance: x / SG: x / pH: x  Gluc: 93 mg/dL / Ketone: x  / Bili: x / Urobili: x   Blood: x / Protein: x / Nitrite: x   Leuk Esterase: x / RBC: x / WBC x   Sq Epi: x / Non Sq Epi: x / Bacteria: x        RADIOLOGY & ADDITIONAL TESTS:  Studies reviewed.    ASSESSMENT & PLAN: 
Date of service: 04-02-25 @ 09:08    Lying in bed in NAD  Has frequent loose stools  Reported scant blood in stool this AM  Has abdominal cramps  Low grade fever    ROS: denies dizziness, no HA, no SOB or cough, no dysuria, no legs pain, no rashes    MEDICATIONS  (STANDING):  chlorhexidine 4% Liquid 1 Application(s) Topical <User Schedule>  fluticasone propionate/ salmeterol 250-50 MICROgram(s) Diskus 1 Dose(s) Inhalation two times a day  levothyroxine 112 MICROGram(s) Oral daily  lidocaine 2% Jelly 5 milliLiter(s) Topical three times a day  losartan 25 milliGRAM(s) Oral daily  piperacillin/tazobactam IVPB.. 3.375 Gram(s) IV Intermittent every 8 hours    Vital Signs Last 24 Hrs  T(C): 37.4 (02 Apr 2025 08:58), Max: 37.4 (02 Apr 2025 08:58)  T(F): 99.3 (02 Apr 2025 08:58), Max: 99.3 (02 Apr 2025 08:58)  HR: 64 (02 Apr 2025 08:58) (63 - 81)  BP: 126/77 (02 Apr 2025 08:58) (96/55 - 126/77)  BP(mean): 78 (01 Apr 2025 23:20) (78 - 78)  RR: 18 (02 Apr 2025 08:58) (17 - 18)  SpO2: 95% (02 Apr 2025 08:58) (95% - 97%)    Parameters below as of 02 Apr 2025 08:58  Patient On (Oxygen Delivery Method): room air     Physical exam:    Constitutional:  No acute distress  HEENT: NC/AT, EOMI, PERRLA, conjunctivae clear; ears and nose atraumatic; pharynx benign  Neck: supple; thyroid not palpable  Back: no tenderness  Respiratory: respiratory effort normal; clear to auscultation  Cardiovascular: S1S2 regular, no murmurs  Abdomen: soft, not tender, not distended, positive BS; no liver or spleen organomegaly  Genitourinary: no suprapubic tenderness  Lymphatic: no LN palpable  Musculoskeletal: no muscle tenderness, no joint swelling or tenderness  Extremities: no pedal edema  Neurological/ Psychiatric: AxOx3, judgement and insight normal; moving all extremities  Skin: no rashes; no palpable lesions    Labs: reviewed                        10.4   2.39  )-----------( 116      ( 01 Apr 2025 09:52 )             31.8     04-01    134[L]  |  103  |  4[L]  ----------------------------<  93  3.3[L]   |  27  |  0.58    Ca    8.3[L]      01 Apr 2025 09:52    TPro  5.5[L]  /  Alb  2.6[L]  /  TBili  0.4  /  DBili  x   /  AST  58[H]  /  ALT  61  /  AlkPhos  61  04-01                        10.3   1.77  )-----------( 107      ( 31 Mar 2025 07:46 )             30.9     03-31    139  |  107  |  6[L]  ----------------------------<  89  3.3[L]   |  27  |  0.52    Ca    8.0[L]      31 Mar 2025 07:46    TPro  5.1[L]  /  Alb  2.4[L]  /  TBili  0.4  /  DBili  x   /  AST  22  /  ALT  31  /  AlkPhos  54  03-31     LIVER FUNCTIONS - ( 31 Mar 2025 07:46 )  Alb: 2.4 g/dL / Pro: 5.1 gm/dL / ALK PHOS: 54 U/L / ALT: 31 U/L / AST: 22 U/L / GGT: x           Urinalysis with Rflx Culture (03-30 @ 20:55)  Urine Appearance: Clear  Protein, Urine: Negative mg/dL  Urine Microscopic-Add On (NC) (03-30 @ 20:55)  White Blood Cell - Urine: 1 /HPF  Red Blood Cell - Urine: 21 /HPF    Urinalysis with Rflx Culture (collected 30 Mar 2025 20:55)    Culture - Blood (collected 30 Mar 2025 18:56)  Source: Blood None  Preliminary Report (01 Apr 2025 01:02):    No growth at 24 hours    Culture - Blood (collected 30 Mar 2025 18:56)  Source: Blood None  Preliminary Report (01 Apr 2025 01:02):    No growth at 24 hours    GI PCR: E.coli    Radiology: all available radiological tests reviewed  < from: CT Abdomen and Pelvis w/ IV Cont (03.30.25 @ 20:36) >  Mild nonspecific rectosigmoid colitis may be related to post therapeutic change, inflammatory, or infectious etiology. No free air or focal collection.  Soft tissue fullness of the anorectal soft tissues compatible with history of anal carcinoma. No upstream dilatation.  Diverticulosis.  No evidence of acute appendicitis at this time. There are appendicoliths   < end of copied text >    Advanced directives addressed: full resuscitation
HOSPITALIST ATTENDING PROGRESS NOTE    Chart and meds reviewed.  Patient seen and examined.    CC: Abd pain    Subjective: Still with anal pain. No bleeding. Still with fever.     All other systems reviewed and found to be negative with the exception of what has been described above.    MEDICATIONS  (STANDING):  dextrose 5%. 1000 milliLiter(s) (50 mL/Hr) IV Continuous <Continuous>  dextrose 5%. 1000 milliLiter(s) (100 mL/Hr) IV Continuous <Continuous>  dextrose 50% Injectable 25 Gram(s) IV Push once  dextrose 50% Injectable 12.5 Gram(s) IV Push once  dextrose 50% Injectable 25 Gram(s) IV Push once  glucagon  Injectable 1 milliGRAM(s) IntraMuscular once  insulin lispro (ADMELOG) corrective regimen sliding scale   SubCutaneous every 6 hours  levothyroxine 112 MICROGram(s) Oral daily  lidocaine 2% Jelly 5 milliLiter(s) Topical three times a day  losartan 25 milliGRAM(s) Oral daily  piperacillin/tazobactam IVPB.. 3.375 Gram(s) IV Intermittent every 8 hours  potassium chloride   Powder 40 milliEquivalent(s) Oral once  sodium chloride 0.9%. 1000 milliLiter(s) (100 mL/Hr) IV Continuous <Continuous>    MEDICATIONS  (PRN):  acetaminophen     Tablet .. 650 milliGRAM(s) Oral every 6 hours PRN Temp greater or equal to 38C (100.4F), Mild Pain (1 - 3)  dextrose Oral Gel 15 Gram(s) Oral once PRN Blood Glucose LESS THAN 70 milliGRAM(s)/deciliter  morphine  - Injectable 2 milliGRAM(s) IV Push every 4 hours PRN Severe Pain (7 - 10)  oxycodone    5 mG/acetaminophen 325 mG 1 Tablet(s) Oral every 4 hours PRN Moderate Pain (4 - 6)    Vital Signs Last 24 Hrs  T(C): 36.4 (31 Mar 2025 07:53), Max: 38.3 (31 Mar 2025 01:45)  T(F): 97.5 (31 Mar 2025 07:53), Max: 101 (31 Mar 2025 02:32)  HR: 69 (31 Mar 2025 07:53) (69 - 86)  BP: 117/53 (31 Mar 2025 07:53) (112/74 - 137/80)  BP(mean): 95 (30 Mar 2025 23:28) (81 - 95)  RR: 16 (31 Mar 2025 07:53) (14 - 18)  SpO2: 98% (31 Mar 2025 07:53) (95% - 100%)    Parameters below as of 31 Mar 2025 07:53  Patient On (Oxygen Delivery Method): room air    GEN: NAD  HEENT:  pupils equal and reactive, EOMI, no oropharyngeal lesions, erythema, exudates, oral thrush  NECK:   supple, no carotid bruits  CV:  +S1, +S2, regular, no murmurs  RESP:   lungs clear to auscultation bilaterally, no wheezing, rales, rhonchi, good air entry bilaterally  GI:  abdomen soft, non-tender, non-distended, normal BS  EXT:  no clubbing, no cyanosis, no edema, no calf pain, swelling or erythema  NEURO:  AAOX3, no focal neurological deficits, follows all commands, able to move extremities spontaneously  SKIN:  no ulcers, lesions or rashes    LABS:                          10.3   1.77  )-----------( 107      ( 31 Mar 2025 07:46 )             30.9     03-31    139  |  107  |  6[L]  ----------------------------<  89  3.3[L]   |  27  |  0.52    Ca    8.0[L]      31 Mar 2025 07:46    TPro  5.1[L]  /  Alb  2.4[L]  /  TBili  0.4  /  DBili  x   /  AST  22  /  ALT  31  /  AlkPhos  54  03-31    LIVER FUNCTIONS - ( 31 Mar 2025 07:46 )  Alb: 2.4 g/dL / Pro: 5.1 gm/dL / ALK PHOS: 54 U/L / ALT: 31 U/L / AST: 22 U/L / GGT: x           PT/INR - ( 31 Mar 2025 07:46 )   PT: 13.7 sec;   INR: 1.19 ratio    PTT - ( 31 Mar 2025 07:46 )  PTT:30.9 sec    Urinalysis Basic - ( 31 Mar 2025 07:46 )  Color: x / Appearance: x / SG: x / pH: x  Gluc: 89 mg/dL / Ketone: x  / Bili: x / Urobili: x   Blood: x / Protein: x / Nitrite: x   Leuk Esterase: x / RBC: x / WBC x   Sq Epi: x / Non Sq Epi: x / Bacteria: x      : CT Abdomen and Pelvis w/ IV Cont (03.30.25 @ 20:36) >    IMPRESSION:    Mild nonspecific rectosigmoid colitis may be related to post therapeutic   change, inflammatory, or infectious etiology. No free air or focal   collection.  Soft tissue fullness of the anorectal soft tissues compatible with   history of anal carcinoma. No upstream dilatation.  Diverticulosis.    No evidence of acute appendicitis at this time. There are appendicoliths   at the cecal base and proximal appendix.    < end of copied text >        
Date of service: 04-03-25 @ 08:44    Lying in bed in NAD  Has loose stools improved, at baseline  Fever is down    ROS: no fever or chills; denies dizziness, no HA, no SOB or cough, no abdominal pain, no dysuria, no legs pain, no rashes    MEDICATIONS  (STANDING):  chlorhexidine 4% Liquid 1 Application(s) Topical <User Schedule>  fluticasone propionate/ salmeterol 250-50 MICROgram(s) Diskus 1 Dose(s) Inhalation two times a day  levothyroxine 112 MICROGram(s) Oral daily  lidocaine 2% Jelly 5 milliLiter(s) Topical three times a day  loperamide 2 milliGRAM(s) Oral three times a day  losartan 25 milliGRAM(s) Oral daily  piperacillin/tazobactam IVPB.. 3.375 Gram(s) IV Intermittent every 8 hours    Vital Signs Last 24 Hrs  T(C): 36.8 (03 Apr 2025 08:09), Max: 37.6 (02 Apr 2025 16:42)  T(F): 98.2 (03 Apr 2025 08:09), Max: 99.6 (02 Apr 2025 16:42)  HR: 72 (03 Apr 2025 08:09) (64 - 72)  BP: 115/58 (03 Apr 2025 08:09) (104/54 - 126/77)  BP(mean): --  RR: 18 (03 Apr 2025 08:09) (18 - 18)  SpO2: 94% (03 Apr 2025 08:09) (94% - 99%)    Parameters below as of 03 Apr 2025 08:09  Patient On (Oxygen Delivery Method): room air     Physical exam:    Constitutional:  No acute distress  HEENT: NC/AT, EOMI, PERRLA, conjunctivae clear; ears and nose atraumatic; pharynx benign  Neck: supple; thyroid not palpable  Back: no tenderness  Respiratory: respiratory effort normal; clear to auscultation  Cardiovascular: S1S2 regular, no murmurs  Abdomen: soft, not tender, not distended, positive BS; no liver or spleen organomegaly  Genitourinary: no suprapubic tenderness  Lymphatic: no LN palpable  Musculoskeletal: no muscle tenderness, no joint swelling or tenderness  Extremities: no pedal edema  Neurological/ Psychiatric: AxOx3, judgement and insight normal; moving all extremities  Skin: no rashes; no palpable lesions    Labs: reviewed                        10.6   2.60  )-----------( 130      ( 03 Apr 2025 06:26 )             31.3     04-03    138  |  105  |  2[L]  ----------------------------<  86  3.3[L]   |  26  |  0.52    Ca    8.5      03 Apr 2025 06:26    TPro  5.4[L]  /  Alb  2.5[L]  /  TBili  0.3  /  DBili  x   /  AST  51[H]  /  ALT  53  /  AlkPhos  65  04-03                        10.4   2.39  )-----------( 116      ( 01 Apr 2025 09:52 )             31.8     04-01    134[L]  |  103  |  4[L]  ----------------------------<  93  3.3[L]   |  27  |  0.58    Ca    8.3[L]      01 Apr 2025 09:52    TPro  5.5[L]  /  Alb  2.6[L]  /  TBili  0.4  /  DBili  x   /  AST  58[H]  /  ALT  61  /  AlkPhos  61  04-01                        10.3   1.77  )-----------( 107      ( 31 Mar 2025 07:46 )             30.9     03-31    139  |  107  |  6[L]  ----------------------------<  89  3.3[L]   |  27  |  0.52    Ca    8.0[L]      31 Mar 2025 07:46    TPro  5.1[L]  /  Alb  2.4[L]  /  TBili  0.4  /  DBili  x   /  AST  22  /  ALT  31  /  AlkPhos  54  03-31     LIVER FUNCTIONS - ( 31 Mar 2025 07:46 )  Alb: 2.4 g/dL / Pro: 5.1 gm/dL / ALK PHOS: 54 U/L / ALT: 31 U/L / AST: 22 U/L / GGT: x           Urinalysis with Rflx Culture (03-30 @ 20:55)  Urine Appearance: Clear  Protein, Urine: Negative mg/dL  Urine Microscopic-Add On (NC) (03-30 @ 20:55)  White Blood Cell - Urine: 1 /HPF  Red Blood Cell - Urine: 21 /HPF    Urinalysis with Rflx Culture (collected 30 Mar 2025 20:55)    Culture - Blood (collected 30 Mar 2025 18:56)  Source: Blood None  Preliminary Report (01 Apr 2025 01:02):    No growth at 24 hours    Culture - Blood (collected 30 Mar 2025 18:56)  Source: Blood None  Preliminary Report (01 Apr 2025 01:02):    No growth at 24 hours    GI PCR: E.coli    Radiology: all available radiological tests reviewed  < from: CT Abdomen and Pelvis w/ IV Cont (03.30.25 @ 20:36) >  Mild nonspecific rectosigmoid colitis may be related to post therapeutic change, inflammatory, or infectious etiology. No free air or focal collection.  Soft tissue fullness of the anorectal soft tissues compatible with history of anal carcinoma. No upstream dilatation.  Diverticulosis.  No evidence of acute appendicitis at this time. There are appendicoliths   < end of copied text >    Advanced directives addressed: full resuscitation
Date of service: 04-04-25 @ 08:02    Lying in bed in NAD  Loose stools are improved  No further abdominal cramps    ROS: no fever or chills; denies dizziness, no HA, no SOB or cough, no abdominal pain, no dysuria, no legs pain, no rashes    MEDICATIONS  (STANDING):  cefuroxime   Tablet 500 milliGRAM(s) Oral every 12 hours  chlorhexidine 4% Liquid 1 Application(s) Topical <User Schedule>  fluticasone propionate/ salmeterol 250-50 MICROgram(s) Diskus 1 Dose(s) Inhalation two times a day  levothyroxine 112 MICROGram(s) Oral daily  lidocaine 2% Jelly 5 milliLiter(s) Topical three times a day  loperamide 2 milliGRAM(s) Oral every 3 hours  losartan 25 milliGRAM(s) Oral daily    Vital Signs Last 24 Hrs  T(C): 36.9 (04 Apr 2025 00:06), Max: 36.9 (04 Apr 2025 00:06)  T(F): 98.5 (04 Apr 2025 00:06), Max: 98.5 (04 Apr 2025 00:06)  HR: 73 (04 Apr 2025 00:06) (69 - 74)  BP: 121/66 (04 Apr 2025 00:06) (115/58 - 121/66)  BP(mean): --  RR: 18 (04 Apr 2025 00:06) (18 - 18)  SpO2: 95% (04 Apr 2025 00:06) (94% - 95%)    Parameters below as of 04 Apr 2025 00:06  Patient On (Oxygen Delivery Method): room air     Physical exam:    Constitutional:  No acute distress  HEENT: NC/AT, EOMI, PERRLA, conjunctivae clear; ears and nose atraumatic; pharynx benign  Neck: supple; thyroid not palpable  Back: no tenderness  Respiratory: respiratory effort normal; clear to auscultation  Cardiovascular: S1S2 regular, no murmurs  Abdomen: soft, not tender, not distended, positive BS; no liver or spleen organomegaly  Genitourinary: no suprapubic tenderness  Lymphatic: no LN palpable  Musculoskeletal: no muscle tenderness, no joint swelling or tenderness  Extremities: no pedal edema  Neurological/ Psychiatric: AxOx3, judgement and insight normal; moving all extremities  Skin: no rashes; no palpable lesions    Labs: reviewed                        10.6   2.60  )-----------( 130      ( 03 Apr 2025 06:26 )             31.3     04-03    138  |  105  |  2[L]  ----------------------------<  86  3.3[L]   |  26  |  0.52    Ca    8.5      03 Apr 2025 06:26  Mg     1.8     04-03    TPro  5.4[L]  /  Alb  2.5[L]  /  TBili  0.3  /  DBili  x   /  AST  51[H]  /  ALT  53  /  AlkPhos  65  04-03                        10.6   2.60  )-----------( 130      ( 03 Apr 2025 06:26 )             31.3     04-03    138  |  105  |  2[L]  ----------------------------<  86  3.3[L]   |  26  |  0.52    Ca    8.5      03 Apr 2025 06:26    TPro  5.4[L]  /  Alb  2.5[L]  /  TBili  0.3  /  DBili  x   /  AST  51[H]  /  ALT  53  /  AlkPhos  65  04-03                        10.4   2.39  )-----------( 116      ( 01 Apr 2025 09:52 )             31.8     04-01    134[L]  |  103  |  4[L]  ----------------------------<  93  3.3[L]   |  27  |  0.58    Ca    8.3[L]      01 Apr 2025 09:52    TPro  5.5[L]  /  Alb  2.6[L]  /  TBili  0.4  /  DBili  x   /  AST  58[H]  /  ALT  61  /  AlkPhos  61  04-01                        10.3   1.77  )-----------( 107      ( 31 Mar 2025 07:46 )             30.9     03-31    139  |  107  |  6[L]  ----------------------------<  89  3.3[L]   |  27  |  0.52    Ca    8.0[L]      31 Mar 2025 07:46    TPro  5.1[L]  /  Alb  2.4[L]  /  TBili  0.4  /  DBili  x   /  AST  22  /  ALT  31  /  AlkPhos  54  03-31     LIVER FUNCTIONS - ( 31 Mar 2025 07:46 )  Alb: 2.4 g/dL / Pro: 5.1 gm/dL / ALK PHOS: 54 U/L / ALT: 31 U/L / AST: 22 U/L / GGT: x           Urinalysis with Rflx Culture (03-30 @ 20:55)  Urine Appearance: Clear  Protein, Urine: Negative mg/dL  Urine Microscopic-Add On (NC) (03-30 @ 20:55)  White Blood Cell - Urine: 1 /HPF  Red Blood Cell - Urine: 21 /HPF    Urinalysis with Rflx Culture (collected 30 Mar 2025 20:55)    Culture - Blood (collected 30 Mar 2025 18:56)  Source: Blood None  Preliminary Report (01 Apr 2025 01:02):    No growth at 24 hours    Culture - Blood (collected 30 Mar 2025 18:56)  Source: Blood None  Preliminary Report (01 Apr 2025 01:02):    No growth at 24 hours    GI PCR: E.coli    Radiology: all available radiological tests reviewed  < from: CT Abdomen and Pelvis w/ IV Cont (03.30.25 @ 20:36) >  Mild nonspecific rectosigmoid colitis may be related to post therapeutic change, inflammatory, or infectious etiology. No free air or focal collection.  Soft tissue fullness of the anorectal soft tissues compatible with history of anal carcinoma. No upstream dilatation.  Diverticulosis.  No evidence of acute appendicitis at this time. There are appendicoliths   < end of copied text >    Advanced directives addressed: full resuscitation
HOSPITALIST ATTENDING PROGRESS NOTE    Chart and meds reviewed.  Patient seen and examined.    CC: abd pain     Subjective: seo defervesced overnight on iv zosyn     All other systems reviewed and found to be negative with the exception of what has been described above.    MEDICATIONS  (STANDING):  fluticasone propionate/ salmeterol 250-50 MICROgram(s) Diskus 1 Dose(s) Inhalation two times a day  levothyroxine 112 MICROGram(s) Oral daily  lidocaine 2% Jelly 5 milliLiter(s) Topical three times a day  losartan 25 milliGRAM(s) Oral daily  piperacillin/tazobactam IVPB.. 3.375 Gram(s) IV Intermittent every 8 hours    MEDICATIONS  (PRN):  acetaminophen     Tablet .. 650 milliGRAM(s) Oral every 6 hours PRN Temp greater or equal to 38C (100.4F), Mild Pain (1 - 3)  albuterol    90 MICROgram(s) HFA Inhaler 2 Puff(s) Inhalation every 6 hours PRN Bronchospasm  morphine  - Injectable 2 milliGRAM(s) IV Push every 4 hours PRN Severe Pain (7 - 10)  oxycodone    5 mG/acetaminophen 325 mG 1 Tablet(s) Oral every 4 hours PRN Moderate Pain (4 - 6)      VITALS:  T(F): 98.7 (04-01-25 @ 16:20), Max: 98.9 (03-31-25 @ 17:59)  HR: 63 (04-01-25 @ 16:20) (60 - 75)  BP: 96/55 (04-01-25 @ 16:20) (96/55 - 120/71)  RR: 17 (04-01-25 @ 16:20) (16 - 18)  SpO2: 97% (04-01-25 @ 16:20) (95% - 99%)  Wt(kg): --    I&O's Summary    31 Mar 2025 07:01  -  01 Apr 2025 07:00  --------------------------------------------------------  IN: 1265 mL / OUT: 0 mL / NET: 1265 mL    01 Apr 2025 07:01  -  01 Apr 2025 16:21  --------------------------------------------------------  IN: 185 mL / OUT: 0 mL / NET: 185 mL        CAPILLARY BLOOD GLUCOSE          PHYSICAL EXAM:  GEN: NAD  HEENT:  pupils equal and reactive, EOMI, no oropharyngeal lesions, erythema, exudates, oral thrush  NECK:   supple, no carotid bruits  CV:  +S1, +S2, regular, no murmurs  RESP:   lungs clear to auscultation bilaterally, no wheezing, rales, rhonchi, good air entry bilaterally  GI:  abdomen soft, non-tender, non-distended, normal BS  EXT:  no clubbing, no cyanosis, no edema, no calf pain, swelling or erythema  NEURO:  AAOX3, no focal neurological deficits, follows all commands, able to move extremities spontaneously  SKIN:  no ulcers, lesions or rashes    LABS:                            10.4   2.39  )-----------( 116      ( 01 Apr 2025 09:52 )             31.8     04-01    134[L]  |  103  |  4[L]  ----------------------------<  93  3.3[L]   |  27  |  0.58    Ca    8.3[L]      01 Apr 2025 09:52    TPro  5.5[L]  /  Alb  2.6[L]  /  TBili  0.4  /  DBili  x   /  AST  58[H]  /  ALT  61  /  AlkPhos  61  04-01        LIVER FUNCTIONS - ( 01 Apr 2025 09:52 )  Alb: 2.6 g/dL / Pro: 5.5 gm/dL / ALK PHOS: 61 U/L / ALT: 61 U/L / AST: 58 U/L / GGT: x           PT/INR - ( 31 Mar 2025 07:46 )   PT: 13.7 sec;   INR: 1.19 ratio         PTT - ( 31 Mar 2025 07:46 )  PTT:30.9 sec  Urinalysis Basic - ( 01 Apr 2025 09:52 )    Color: x / Appearance: x / SG: x / pH: x  Gluc: 93 mg/dL / Ketone: x  / Bili: x / Urobili: x   Blood: x / Protein: x / Nitrite: x   Leuk Esterase: x / RBC: x / WBC x   Sq Epi: x / Non Sq Epi: x / Bacteria: x              CULTURES:      Additional results/Imaging, I have personally reviewed:    Telemetry, personally reviewed:
HOSPITALIST ATTENDING PROGRESS NOTE    Chart and meds reviewed.  Patient seen and examined.  CC: abd pain     Subjective: stool studies positive for eaec and indeterminant for norovirus pt started on imodium remains on abx per id       All other systems reviewed and found to be negative with the exception of what has been described above.    MEDICATIONS  (STANDING):  chlorhexidine 4% Liquid 1 Application(s) Topical <User Schedule>  fluticasone propionate/ salmeterol 250-50 MICROgram(s) Diskus 1 Dose(s) Inhalation two times a day  levothyroxine 112 MICROGram(s) Oral daily  lidocaine 2% Jelly 5 milliLiter(s) Topical three times a day  loperamide 2 milliGRAM(s) Oral every 3 hours  losartan 25 milliGRAM(s) Oral daily  piperacillin/tazobactam IVPB.. 3.375 Gram(s) IV Intermittent every 8 hours    MEDICATIONS  (PRN):  acetaminophen     Tablet .. 650 milliGRAM(s) Oral every 6 hours PRN Temp greater or equal to 38C (100.4F), Mild Pain (1 - 3)  albuterol    90 MICROgram(s) HFA Inhaler 2 Puff(s) Inhalation every 6 hours PRN Bronchospasm  morphine  - Injectable 2 milliGRAM(s) IV Push every 4 hours PRN Severe Pain (7 - 10)  oxycodone    5 mG/acetaminophen 325 mG 1 Tablet(s) Oral every 4 hours PRN Moderate Pain (4 - 6)      VITALS:  T(F): 98.2 (04-03-25 @ 08:09), Max: 99.6 (04-02-25 @ 16:42)  HR: 72 (04-03-25 @ 08:09) (67 - 72)  BP: 115/58 (04-03-25 @ 08:09) (104/54 - 115/58)  RR: 18 (04-03-25 @ 08:09) (18 - 18)  SpO2: 94% (04-03-25 @ 08:09) (94% - 99%)  Wt(kg): --    I&O's Summary    02 Apr 2025 07:01  -  03 Apr 2025 07:00  --------------------------------------------------------  IN: 480 mL / OUT: 0 mL / NET: 480 mL        CAPILLARY BLOOD GLUCOSE        PHYSICAL EXAM:  GEN: NAD  HEENT:  pupils equal and reactive, EOMI, no oropharyngeal lesions, erythema, exudates, oral thrush  NECK:   supple, no carotid bruits  CV:  +S1, +S2, regular, no murmurs  RESP:   lungs clear to auscultation bilaterally, no wheezing, rales, rhonchi, good air entry bilaterally  GI:  abdomen soft, non-tender, non-distended, normal BS  EXT:  no clubbing, no cyanosis, no edema, no calf pain, swelling or erythema  NEURO:  AAOX3, no focal neurological deficits, follows all commands, able to move extremities spontaneously  SKIN:  no ulcers, lesions or rashes      LABS:                            10.6   2.60  )-----------( 130      ( 03 Apr 2025 06:26 )             31.3     04-03    138  |  105  |  2[L]  ----------------------------<  86  3.3[L]   |  26  |  0.52    Ca    8.5      03 Apr 2025 06:26  Mg     1.8     04-03    TPro  5.4[L]  /  Alb  2.5[L]  /  TBili  0.3  /  DBili  x   /  AST  51[H]  /  ALT  53  /  AlkPhos  65  04-03        LIVER FUNCTIONS - ( 03 Apr 2025 06:26 )  Alb: 2.5 g/dL / Pro: 5.4 gm/dL / ALK PHOS: 65 U/L / ALT: 53 U/L / AST: 51 U/L / GGT: x             Urinalysis Basic - ( 03 Apr 2025 06:26 )    Color: x / Appearance: x / SG: x / pH: x  Gluc: 86 mg/dL / Ketone: x  / Bili: x / Urobili: x   Blood: x / Protein: x / Nitrite: x   Leuk Esterase: x / RBC: x / WBC x   Sq Epi: x / Non Sq Epi: x / Bacteria: x              CULTURES:      Additional results/Imaging, I have personally reviewed:    Telemetry, personally reviewed:
INTERVAL HPI/OVERNIGHT EVENTS:  Patient S&E at bedside. No o/n events, patient resting comfortably. bowel movements have decreased.       VITAL SIGNS:  T(F): 98.5 (04-04-25 @ 16:33)  HR: 67 (04-04-25 @ 16:33)  BP: 104/64 (04-04-25 @ 16:33)  RR: 18 (04-04-25 @ 16:33)  SpO2: 96% (04-04-25 @ 16:33)  Wt(kg): --    PHYSICAL EXAM:    Constitutional: NAD  Respiratory: CTA b/l, good air entry b/l  Cardiovascular: RRR, no M/R/G  Gastrointestinal: soft, NTND, no masses palpable, + BS, no hepatosplenomegaly  Extremities: no c/c/e  Neurological: AAOx3      MEDICATIONS  (STANDING):  cefuroxime   Tablet 500 milliGRAM(s) Oral every 12 hours  chlorhexidine 4% Liquid 1 Application(s) Topical <User Schedule>  fluticasone propionate/ salmeterol 250-50 MICROgram(s) Diskus 1 Dose(s) Inhalation two times a day  levothyroxine 112 MICROGram(s) Oral daily  lidocaine 2% Jelly 5 milliLiter(s) Topical three times a day  loperamide 2 milliGRAM(s) Oral every 3 hours  losartan 25 milliGRAM(s) Oral daily    MEDICATIONS  (PRN):  acetaminophen     Tablet .. 650 milliGRAM(s) Oral every 6 hours PRN Temp greater or equal to 38C (100.4F), Mild Pain (1 - 3)  albuterol    90 MICROgram(s) HFA Inhaler 2 Puff(s) Inhalation every 6 hours PRN Bronchospasm  morphine  - Injectable 2 milliGRAM(s) IV Push every 4 hours PRN Severe Pain (7 - 10)  oxycodone    5 mG/acetaminophen 325 mG 1 Tablet(s) Oral every 4 hours PRN Moderate Pain (4 - 6)      Allergies    No Known Allergies    Intolerances        LABS:                        10.6   2.60  )-----------( 130      ( 03 Apr 2025 06:26 )             31.3     04-03    138  |  105  |  2[L]  ----------------------------<  86  3.3[L]   |  26  |  0.52    Ca    8.5      03 Apr 2025 06:26  Mg     1.8     04-03    TPro  5.4[L]  /  Alb  2.5[L]  /  TBili  0.3  /  DBili  x   /  AST  51[H]  /  ALT  53  /  AlkPhos  65  04-03      Urinalysis Basic - ( 03 Apr 2025 06:26 )    Color: x / Appearance: x / SG: x / pH: x  Gluc: 86 mg/dL / Ketone: x  / Bili: x / Urobili: x   Blood: x / Protein: x / Nitrite: x   Leuk Esterase: x / RBC: x / WBC x   Sq Epi: x / Non Sq Epi: x / Bacteria: x        RADIOLOGY & ADDITIONAL TESTS:  Studies reviewed.    ASSESSMENT & PLAN: 
INTERVAL HPI/OVERNIGHT EVENTS:  Patient S&E at bedside. No o/n events, patient resting comfortably. continues with ongoing diarrhea     VITAL SIGNS:  T(F): 99.3 (04-02-25 @ 08:58)  HR: 68 (04-02-25 @ 11:40)  BP: 126/77 (04-02-25 @ 08:58)  RR: 18 (04-02-25 @ 08:58)  SpO2: 96% (04-02-25 @ 11:40)  Wt(kg): --    PHYSICAL EXAM:    Constitutional: NAD  Eyes: EOMI, sclera non-icteric  Neck: supple, no masses, no JVD  Respiratory: CTA b/l, good air entry b/l  Cardiovascular: RRR, no M/R/G  Gastrointestinal: soft,TTP   Extremities: no c/c/e  Neurological: AAOx3      MEDICATIONS  (STANDING):  chlorhexidine 4% Liquid 1 Application(s) Topical <User Schedule>  fluticasone propionate/ salmeterol 250-50 MICROgram(s) Diskus 1 Dose(s) Inhalation two times a day  levothyroxine 112 MICROGram(s) Oral daily  lidocaine 2% Jelly 5 milliLiter(s) Topical three times a day  loperamide 2 milliGRAM(s) Oral three times a day  losartan 25 milliGRAM(s) Oral daily  piperacillin/tazobactam IVPB.. 3.375 Gram(s) IV Intermittent every 8 hours    MEDICATIONS  (PRN):  acetaminophen     Tablet .. 650 milliGRAM(s) Oral every 6 hours PRN Temp greater or equal to 38C (100.4F), Mild Pain (1 - 3)  albuterol    90 MICROgram(s) HFA Inhaler 2 Puff(s) Inhalation every 6 hours PRN Bronchospasm  morphine  - Injectable 2 milliGRAM(s) IV Push every 4 hours PRN Severe Pain (7 - 10)  oxycodone    5 mG/acetaminophen 325 mG 1 Tablet(s) Oral every 4 hours PRN Moderate Pain (4 - 6)      Allergies    No Known Allergies    Intolerances        LABS:                        10.0   2.70  )-----------( 121      ( 02 Apr 2025 09:17 )             30.2     04-02    135  |  104  |  3[L]  ----------------------------<  84  3.5   |  25  |  0.49[L]    Ca    8.4[L]      02 Apr 2025 09:17    TPro  5.2[L]  /  Alb  2.5[L]  /  TBili  0.4  /  DBili  x   /  AST  38[H]  /  ALT  48  /  AlkPhos  64  04-02      Urinalysis Basic - ( 02 Apr 2025 09:17 )    Color: x / Appearance: x / SG: x / pH: x  Gluc: 84 mg/dL / Ketone: x  / Bili: x / Urobili: x   Blood: x / Protein: x / Nitrite: x   Leuk Esterase: x / RBC: x / WBC x   Sq Epi: x / Non Sq Epi: x / Bacteria: x        RADIOLOGY & ADDITIONAL TESTS:  Studies reviewed.    ASSESSMENT & PLAN:

## 2025-04-04 NOTE — PROGRESS NOTE ADULT - ASSESSMENT
65 y/o F w/ PMH anal cancer [place] [facility] [place] with [place], COPD, DM p/w generalized weakness.     # anal cancer  - follows at [facility] [place]  ON JUNE PROTOCOL   - currently on RT concurrent with xeloda on D1-5 and mitomycin on D1,29   - hold XELODA  - concern for possible radiation induced colitis   NOW FOUND TO HAVE ECOLI - EAEC and indeterminate NOROVIRUS   repeat stool neg norovirus   - discussed with ID and planned for discharge on po CEFTIN  - C DIFF neg   - diarrhea - recommend ongoing immodium  # pancytopenia secondary to antineoplastic therapy   - would plan for daily CBC with DIFF    - now afebrile    - transfuse for hgb < 7     # fever  -  afebrile - now planned for CEFTIN     will follow and communicate with msk

## 2025-04-04 NOTE — DISCHARGE NOTE PROVIDER - HOSPITAL COURSE
65 y/o F w/ PMH of DM2, hypothyroidism, OA, anal cancer (s/p chemo / RT), COPD, p/w generalized weakness. Patient states she got radiation on Friday, and did not feel well after that. On Saturday she barely got out of bed. States she normally has stool leaking due to her anal cancer, and it has not increased in quantity or consistency. States that she also has some mid-abdominal discomfort which started in ED. C/o nausea, but denies vomiting. States she had a Tmax or 102 at home. CTAP showed mild nonspecific rectosigmoid colitis may be related to post therapeutic change, inflammatory, or infectious etiology. Patient was seen by oncology and started on zosyn for radiation proctitis. Stool studies showed EAEC ecoli and indeterminant for norovirus. Pt's abdominal pain improved and she was switched to ceftin per infectious disease. She is to complete 7 more days of oral antibiotics at home. Patient is stable for discharge.

## 2025-04-04 NOTE — DISCHARGE NOTE NURSING/CASE MANAGEMENT/SOCIAL WORK - PATIENT PORTAL LINK FT
You can access the FollowMyHealth Patient Portal offered by [facility] by registering at the following website: [email or web address]. By joining [facility]’s FollowMyHealth portal, you will also be able to view your health information using other applications (apps) compatible with our system.

## 2025-04-04 NOTE — PROGRESS NOTE ADULT - ASSESSMENT
65 y/o Female with h/o DM2, hypothyroidism, OA, anal cancer (s/p chemo / RT), COPD was admitted on 3/30 for generalized weakness. Patient states she got radiation on Friday, and did not feel well after that. On Saturday she barely got out of bed. States she normally has stool leaking due to her anal cancer, and it has not increased in quantity or consistency. States that she also has some mid-abdominal discomfort which started in ED. C/o nausea, but denies vomiting. States she had a Tmax or 102 at home. In ER she received zosyn.     #Febrile syndrome improving  #Mild nonspecific rectosigmoid colitis ?related to XRT, ?inflammatory  #Infectious colitis with E.coli ENAG/ norovirus  #Anal cancer s/p XRT and chemotherapy  #Immunocompromised host.  -BC x 2, urine c/s noted  -new stool studies noted   -on zosyn 3.375 gm IV q8h # 5  -tolerating abx well so far; no side effects noted  -monitor abdomen  -imodium prn  -change abx to ceftin 500 mg PO q12h for 7 more days  -repeat stool GI PCR shows persistent E.coli  -f/u cultures   -f/u with oncology at [facility] [place] as outpatient   -monitor temps  -f/u CBC  -supportive care  2. Other issues:   -care per medicine    d/w Dr. Bravo

## 2025-04-04 NOTE — DISCHARGE NOTE PROVIDER - NSDCCPCAREPLAN_GEN_ALL_CORE_FT
PRINCIPAL DISCHARGE DIAGNOSIS  Diagnosis: Fever  Assessment and Plan of Treatment: You were diagnosed with colitis while admitted.   - you were admitted to the hospital due to abdominal pain and were found to have colitis. Colitis is inflammation and or infection of the large intestine (colon). This can cause pain, weakness and diarrhea.   - please maintain a low fiber diet.   - please stay well hydrated.   - please continue to take antibiotics CEFTIN 500 MG TWICE A DAY FOR 7 DAYS   - Please follow up with your oncologist  in the next week (call for an appointment)   *** Monitor for the follow signs and symptoms and contact your primary care provider or go to the ER if symptoms are severe. ***  fever, chills, weakness, fainting, vomiting, worsening abdominal pain, blood in stool or dark tarry stools, or if new symptoms arise.

## 2025-04-05 LAB
CULTURE RESULTS: SIGNIFICANT CHANGE UP
CULTURE RESULTS: SIGNIFICANT CHANGE UP
SPECIMEN SOURCE: SIGNIFICANT CHANGE UP
SPECIMEN SOURCE: SIGNIFICANT CHANGE UP

## 2025-04-11 ENCOUNTER — APPOINTMENT (OUTPATIENT)
Dept: ORTHOPEDIC SURGERY | Facility: CLINIC | Age: 65
End: 2025-04-11
Payer: COMMERCIAL

## 2025-04-11 DIAGNOSIS — A41.9 SEPSIS, UNSPECIFIED ORGANISM: ICD-10-CM

## 2025-04-11 DIAGNOSIS — K62.7 RADIATION PROCTITIS: ICD-10-CM

## 2025-04-11 DIAGNOSIS — D61.810 ANTINEOPLASTIC CHEMOTHERAPY INDUCED PANCYTOPENIA: ICD-10-CM

## 2025-04-11 DIAGNOSIS — T45.1X5A ADVERSE EFFECT OF ANTINEOPLASTIC AND IMMUNOSUPPRESSIVE DRUGS, INITIAL ENCOUNTER: ICD-10-CM

## 2025-04-11 DIAGNOSIS — M25.511 PAIN IN RIGHT SHOULDER: ICD-10-CM

## 2025-04-11 DIAGNOSIS — N28.1 CYST OF KIDNEY, ACQUIRED: ICD-10-CM

## 2025-04-11 DIAGNOSIS — I10 ESSENTIAL (PRIMARY) HYPERTENSION: ICD-10-CM
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
## 2025-04-11 DIAGNOSIS — M19.90 UNSPECIFIED OSTEOARTHRITIS, UNSPECIFIED SITE: ICD-10-CM

## 2025-04-11 DIAGNOSIS — J44.9 CHRONIC OBSTRUCTIVE PULMONARY DISEASE, UNSPECIFIED: ICD-10-CM
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
## 2025-04-11 DIAGNOSIS — A08.11 ACUTE GASTROENTEROPATHY DUE TO NORWALK AGENT: ICD-10-CM

## 2025-04-11 DIAGNOSIS — C21.0 MALIGNANT NEOPLASM OF ANUS, UNSPECIFIED: ICD-10-CM

## 2025-04-11 DIAGNOSIS — M19.011 PRIMARY OSTEOARTHRITIS, RIGHT SHOULDER: ICD-10-CM

## 2025-04-11 DIAGNOSIS — M25.512 PAIN IN LEFT SHOULDER: ICD-10-CM

## 2025-04-11 DIAGNOSIS — E03.9 HYPOTHYROIDISM, UNSPECIFIED: ICD-10-CM

## 2025-04-11 DIAGNOSIS — M19.012 PRIMARY OSTEOARTHRITIS, LEFT SHOULDER: ICD-10-CM

## 2025-04-11 DIAGNOSIS — E11.9 TYPE 2 DIABETES MELLITUS WITHOUT COMPLICATIONS: ICD-10-CM

## 2025-04-11 DIAGNOSIS — D84.89 OTHER IMMUNODEFICIENCIES: ICD-10-CM

## 2025-04-11 PROCEDURE — 99214 OFFICE O/P EST MOD 30 MIN: CPT | Mod: 25,57

## 2025-04-11 PROCEDURE — 20611 DRAIN/INJ JOINT/BURSA W/US: CPT | Mod: 50

## 2025-04-25 ENCOUNTER — INPATIENT (INPATIENT)
Facility: HOSPITAL | Age: 65
LOS: 3 days | Discharge: HOME | DRG: 810 | End: 2025-04-29
Attending: STUDENT IN AN ORGANIZED HEALTH CARE EDUCATION/TRAINING PROGRAM | Admitting: INTERNAL MEDICINE
Payer: COMMERCIAL

## 2025-04-25 VITALS
RESPIRATION RATE: 20 BRPM | HEART RATE: 80 BPM | SYSTOLIC BLOOD PRESSURE: 142 MMHG | TEMPERATURE: 100 F | OXYGEN SATURATION: 97 % | DIASTOLIC BLOOD PRESSURE: 69 MMHG | WEIGHT: 125.66 LBS

## 2025-04-25 DIAGNOSIS — Z96.641 PRESENCE OF RIGHT ARTIFICIAL HIP JOINT: Chronic | ICD-10-CM

## 2025-04-25 DIAGNOSIS — D70.9 NEUTROPENIA, UNSPECIFIED: ICD-10-CM

## 2025-04-25 LAB
ALBUMIN SERPL ELPH-MCNC: 2.9 G/DL — LOW (ref 3.3–5)
ALP SERPL-CCNC: 64 U/L — SIGNIFICANT CHANGE UP (ref 40–120)
ALT FLD-CCNC: 35 U/L — SIGNIFICANT CHANGE UP (ref 12–78)
ANION GAP SERPL CALC-SCNC: 7 MMOL/L — SIGNIFICANT CHANGE UP (ref 5–17)
ANISOCYTOSIS BLD QL: SLIGHT — SIGNIFICANT CHANGE UP
APPEARANCE UR: CLEAR — SIGNIFICANT CHANGE UP
APTT BLD: 27.6 SEC — SIGNIFICANT CHANGE UP (ref 26.1–36.8)
AST SERPL-CCNC: 38 U/L — HIGH (ref 15–37)
BACTERIA # UR AUTO: NEGATIVE /HPF — SIGNIFICANT CHANGE UP
BASOPHILS # BLD AUTO: 0.01 K/UL — SIGNIFICANT CHANGE UP (ref 0–0.2)
BASOPHILS # BLD MANUAL: 0 K/UL — SIGNIFICANT CHANGE UP (ref 0–0.2)
BASOPHILS NFR BLD AUTO: 0.6 % — SIGNIFICANT CHANGE UP (ref 0–2)
BASOPHILS NFR BLD MANUAL: 0 % — SIGNIFICANT CHANGE UP (ref 0–2)
BILIRUB SERPL-MCNC: 0.6 MG/DL — SIGNIFICANT CHANGE UP (ref 0.2–1.2)
BILIRUB UR-MCNC: NEGATIVE — SIGNIFICANT CHANGE UP
BUN SERPL-MCNC: 6 MG/DL — LOW (ref 7–23)
CALCIUM SERPL-MCNC: 8.5 MG/DL — SIGNIFICANT CHANGE UP (ref 8.5–10.1)
CAST: 0 /LPF — SIGNIFICANT CHANGE UP (ref 0–4)
CHLORIDE SERPL-SCNC: 101 MMOL/L — SIGNIFICANT CHANGE UP (ref 96–108)
CO2 SERPL-SCNC: 25 MMOL/L — SIGNIFICANT CHANGE UP (ref 22–31)
COLOR SPEC: YELLOW — SIGNIFICANT CHANGE UP
CREAT SERPL-MCNC: 0.54 MG/DL — SIGNIFICANT CHANGE UP (ref 0.5–1.3)
DIFF PNL FLD: ABNORMAL
EGFR: 102 ML/MIN/1.73M2 — SIGNIFICANT CHANGE UP
EGFR: 102 ML/MIN/1.73M2 — SIGNIFICANT CHANGE UP
ELLIPTOCYTES BLD QL SMEAR: SLIGHT — SIGNIFICANT CHANGE UP
EOSINOPHIL # BLD AUTO: 0.01 K/UL — SIGNIFICANT CHANGE UP (ref 0–0.5)
EOSINOPHIL # BLD MANUAL: 0 K/UL — SIGNIFICANT CHANGE UP (ref 0–0.5)
EOSINOPHIL NFR BLD AUTO: 0.6 % — SIGNIFICANT CHANGE UP (ref 0–6)
EOSINOPHIL NFR BLD MANUAL: 0 % — SIGNIFICANT CHANGE UP (ref 0–6)
FLUAV AG NPH QL: SIGNIFICANT CHANGE UP
FLUBV AG NPH QL: SIGNIFICANT CHANGE UP
GLUCOSE SERPL-MCNC: 108 MG/DL — HIGH (ref 70–99)
GLUCOSE UR QL: NEGATIVE MG/DL — SIGNIFICANT CHANGE UP
HCT VFR BLD CALC: 29.2 % — LOW (ref 34.5–45)
HGB BLD-MCNC: 9.8 G/DL — LOW (ref 11.5–15.5)
HYPOCHROMIA BLD QL: SLIGHT — SIGNIFICANT CHANGE UP
IMM GRANULOCYTES # BLD AUTO: 0.08 K/UL — HIGH (ref 0–0.07)
IMM GRANULOCYTES NFR BLD AUTO: 4.6 % — HIGH (ref 0–0.9)
IMMATURE PLATELET FRACTION #: 0.9 K/UL — LOW (ref 4.7–11.1)
IMMATURE PLATELET FRACTION %: 1.3 % — LOW (ref 1.6–4.9)
INR BLD: 1.03 RATIO — SIGNIFICANT CHANGE UP (ref 0.85–1.16)
KETONES UR-MCNC: NEGATIVE MG/DL — SIGNIFICANT CHANGE UP
LACTATE SERPL-SCNC: 1.2 MMOL/L — SIGNIFICANT CHANGE UP (ref 0.7–2)
LEUKOCYTE ESTERASE UR-ACNC: ABNORMAL
LYMPHOCYTES # BLD AUTO: 0.33 K/UL — LOW (ref 1–3.3)
LYMPHOCYTES # BLD MANUAL: 0.31 K/UL — LOW (ref 1–3.3)
LYMPHOCYTES NFR BLD AUTO: 19 % — SIGNIFICANT CHANGE UP (ref 13–44)
LYMPHOCYTES NFR BLD MANUAL: 18 % — SIGNIFICANT CHANGE UP (ref 13–44)
MANUAL METAMYELOCYTE #: 0.03 K/UL — HIGH (ref 0–0)
MANUAL MYELOCYTE #: 0.02 K/UL — HIGH (ref 0–0)
MANUAL NEUTROPHIL BANDS #: 0.17 K/UL — SIGNIFICANT CHANGE UP (ref 0–0.84)
MANUAL SMEAR VERIFICATION: SIGNIFICANT CHANGE UP
MCHC RBC-ENTMCNC: 28.7 PG — SIGNIFICANT CHANGE UP (ref 27–34)
MCHC RBC-ENTMCNC: 33.6 G/DL — SIGNIFICANT CHANGE UP (ref 32–36)
MCV RBC AUTO: 85.4 FL — SIGNIFICANT CHANGE UP (ref 80–100)
METAMYELOCYTES # FLD: 2 % — HIGH (ref 0–0)
METAMYELOCYTES NFR BLD: 2 % — HIGH (ref 0–0)
MONOCYTES # BLD AUTO: 0.28 K/UL — SIGNIFICANT CHANGE UP (ref 0–0.9)
MONOCYTES # BLD MANUAL: 0.28 K/UL — SIGNIFICANT CHANGE UP (ref 0–0.9)
MONOCYTES NFR BLD AUTO: 16.1 % — HIGH (ref 2–14)
MONOCYTES NFR BLD MANUAL: 16 % — HIGH (ref 2–14)
MYELOCYTES NFR BLD: 1 % — HIGH (ref 0–0)
NEUTROPHILS # BLD AUTO: 1.03 K/UL — LOW (ref 1.8–7.4)
NEUTROPHILS # BLD MANUAL: 0.92 K/UL — LOW (ref 1.8–7.4)
NEUTROPHILS NFR BLD AUTO: 59.1 % — SIGNIFICANT CHANGE UP (ref 43–77)
NEUTROPHILS NFR BLD MANUAL: 53 % — SIGNIFICANT CHANGE UP (ref 43–77)
NEUTS BAND # BLD: 10 % — HIGH (ref 0–8)
NEUTS BAND NFR BLD: 10 % — HIGH (ref 0–8)
NITRITE UR-MCNC: NEGATIVE — SIGNIFICANT CHANGE UP
NRBC # BLD AUTO: 0 K/UL — SIGNIFICANT CHANGE UP (ref 0–0)
NRBC # FLD: 0 K/UL — SIGNIFICANT CHANGE UP (ref 0–0)
NRBC BLD AUTO-RTO: 0 /100 WBCS — SIGNIFICANT CHANGE UP (ref 0–0)
OVALOCYTES BLD QL SMEAR: SLIGHT — SIGNIFICANT CHANGE UP
PH UR: 6.5 — SIGNIFICANT CHANGE UP (ref 5–8)
PLAT MORPH BLD: NORMAL — SIGNIFICANT CHANGE UP
PLATELET # BLD AUTO: 72 K/UL — LOW (ref 150–400)
PMV BLD: 8.8 FL — SIGNIFICANT CHANGE UP (ref 7–13)
POIKILOCYTOSIS BLD QL AUTO: SLIGHT — SIGNIFICANT CHANGE UP
POTASSIUM SERPL-MCNC: 3.3 MMOL/L — LOW (ref 3.5–5.3)
POTASSIUM SERPL-SCNC: 3.3 MMOL/L — LOW (ref 3.5–5.3)
PROT SERPL-MCNC: 6 GM/DL — SIGNIFICANT CHANGE UP (ref 6–8.3)
PROT UR-MCNC: NEGATIVE MG/DL — SIGNIFICANT CHANGE UP
PROTHROM AB SERPL-ACNC: 12.1 SEC — SIGNIFICANT CHANGE UP (ref 9.9–13.4)
RBC # BLD: 3.42 M/UL — LOW (ref 3.8–5.2)
RBC # FLD: 17.5 % — HIGH (ref 10.3–14.5)
RBC BLD AUTO: ABNORMAL
RBC CASTS # UR COMP ASSIST: 15 /HPF — HIGH (ref 0–4)
RSV RNA NPH QL NAA+NON-PROBE: SIGNIFICANT CHANGE UP
SARS-COV-2 RNA SPEC QL NAA+PROBE: SIGNIFICANT CHANGE UP
SODIUM SERPL-SCNC: 133 MMOL/L — LOW (ref 135–145)
SOURCE RESPIRATORY: SIGNIFICANT CHANGE UP
SP GR SPEC: 1.01 — SIGNIFICANT CHANGE UP (ref 1–1.03)
SQUAMOUS # UR AUTO: 0 /HPF — SIGNIFICANT CHANGE UP (ref 0–5)
UROBILINOGEN FLD QL: 1 MG/DL — SIGNIFICANT CHANGE UP (ref 0.2–1)
WBC # BLD: 1.74 K/UL — LOW (ref 3.8–10.5)
WBC # FLD AUTO: 1.74 K/UL — LOW (ref 3.8–10.5)
WBC MORPHOLOGY: NORMAL — SIGNIFICANT CHANGE UP
WBC UR QL: 2 /HPF — SIGNIFICANT CHANGE UP (ref 0–5)

## 2025-04-25 PROCEDURE — 82962 GLUCOSE BLOOD TEST: CPT

## 2025-04-25 PROCEDURE — 71045 X-RAY EXAM CHEST 1 VIEW: CPT

## 2025-04-25 PROCEDURE — 80053 COMPREHEN METABOLIC PANEL: CPT

## 2025-04-25 PROCEDURE — 85025 COMPLETE CBC W/AUTO DIFF WBC: CPT

## 2025-04-25 PROCEDURE — 87507 IADNA-DNA/RNA PROBE TQ 12-25: CPT

## 2025-04-25 PROCEDURE — 94640 AIRWAY INHALATION TREATMENT: CPT

## 2025-04-25 PROCEDURE — 71045 X-RAY EXAM CHEST 1 VIEW: CPT | Mod: 26

## 2025-04-25 PROCEDURE — 84100 ASSAY OF PHOSPHORUS: CPT

## 2025-04-25 PROCEDURE — 36415 COLL VENOUS BLD VENIPUNCTURE: CPT

## 2025-04-25 PROCEDURE — 74177 CT ABD & PELVIS W/CONTRAST: CPT | Mod: 26

## 2025-04-25 PROCEDURE — 93010 ELECTROCARDIOGRAM REPORT: CPT

## 2025-04-25 PROCEDURE — 83735 ASSAY OF MAGNESIUM: CPT

## 2025-04-25 PROCEDURE — 99285 EMERGENCY DEPT VISIT HI MDM: CPT

## 2025-04-25 PROCEDURE — 80048 BASIC METABOLIC PNL TOTAL CA: CPT

## 2025-04-25 PROCEDURE — 85027 COMPLETE CBC AUTOMATED: CPT

## 2025-04-25 RX ORDER — OXYCODONE HYDROCHLORIDE AND ACETAMINOPHEN 10; 325 MG/1; MG/1
1 TABLET ORAL ONCE
Refills: 0 | Status: DISCONTINUED | OUTPATIENT
Start: 2025-04-25 | End: 2025-04-25

## 2025-04-25 RX ORDER — CEFEPIME 2 G/20ML
2000 INJECTION, POWDER, FOR SOLUTION INTRAVENOUS ONCE
Refills: 0 | Status: COMPLETED | OUTPATIENT
Start: 2025-04-25 | End: 2025-04-25

## 2025-04-25 RX ORDER — CEFEPIME 2 G/20ML
2000 INJECTION, POWDER, FOR SOLUTION INTRAVENOUS ONCE
Refills: 0 | Status: DISCONTINUED | OUTPATIENT
Start: 2025-04-25 | End: 2025-04-25

## 2025-04-25 RX ADMIN — CEFEPIME 2000 MILLIGRAM(S): 2 INJECTION, POWDER, FOR SOLUTION INTRAVENOUS at 21:45

## 2025-04-25 RX ADMIN — OXYCODONE HYDROCHLORIDE AND ACETAMINOPHEN 1 TABLET(S): 10; 325 TABLET ORAL at 21:15

## 2025-04-25 NOTE — ED ADULT NURSE NOTE - OBJECTIVE STATEMENT
64yF AOx4 ambulatory, presents to  ED c/o fever starting today. pt states Tmax 102. pt endorsing malaise and RLQ pain. pt took percocet PTA at 3pm. pt denies CP, SOB. PMH osteoarthritis, COPD, rectal CA. pt has rectal CA, diagnosed in february 2025, who just finished chemo/ radiation therapy on 4/25. pt states she has a low white blood cell count. pt is a pt at [facility] [place]. IV inserted, labs drawn and sent, placed on cardiac monitor, ekg completed, medicated per EMAR.

## 2025-04-25 NOTE — ED ADULT NURSE NOTE - NSFALLHARMRISKINTERV_ED_ALL_ED

## 2025-04-25 NOTE — ED ADULT NURSE NOTE - IN THE PAST 12 MONTHS HAVE YOU USED DRUGS OTHER THAN THOSE REQUIRED FOR MEDICAL REASON?
No Initiate Treatment: triamcinolone acetonide 0.1 % topical cream: Apply to rash on body from the neck down twice daily x2 weeks. Detail Level: Zone Render In Strict Bullet Format?: No Plan: Patient states that she started pravastatin in January and a month later broke out in a rash.

## 2025-04-25 NOTE — ED PROVIDER NOTE - CPE EDP ENMT NORM
[id number] - Outpatient Follow-up Note  Serenity Johnson 31 y o  female MRN: [id number]  Encounter: [id number]    ASSESSMENT AND PLAN:      1  Diarrhea, unspecified type  2  Rectal bleeding  Patient reports an improvement of diarrhea and rectal bleeding  She did have 1 episode of diarrhea over the weekend but reports that she has had intermittent constipation  Associated with occasional bloating and mild lower abdominal pain  Differential diagnoses include colitis, hemorrhoidal bleeding, IBD  - proceed with colonoscopy as scheduled on 02/12/2021  - low-fiber, low residue/bland diet for now  - stay well hydrated  - can trial fiber gummies 1-2 daily     Followup Appointment: 2 months   ______________________________________________________________________    Chief Complaint   Patient presents with    Follow up diarrhea     HPI:  Serenity Johnson is a 31y o  year old female who returns to the office today for follow-up of her diarrhea and rectal bleeding  Patient was last seen for virtual visit in the middle of December after an acute onset of diarrhea and rectal bleeding  Patient reports that she had multiple episodes of diarrhea, lower abdominal pain with clots  She notes that since that time she has only experienced 1 episode of diarrhea that occurred over this past weekend  She denies any further episodes of rectal bleeding  She notes that she is moving her bowels every other day and reports that occasionally they are soft and formed but will also sometimes be hard in consistency  Associated with mild lower abdominal pain and cramping and occasional intermittent bloating  She denies any fever, chills, upper GI symptoms, nausea, vomiting, hematochezia or melena  Her appetite and weight are stable  Historical Information   Past Medical History:   Diagnosis Date    Irritable bowel syndrome     Migraines      History reviewed   No pertinent surgical history  Social History     Substance and Sexual Activity   Alcohol Use Yes    Frequency: 2-4 times a month    Comment: social     Social History     Substance and Sexual Activity   Drug Use Not on file     Social History     Tobacco Use   Smoking Status Never Smoker   Smokeless Tobacco Never Used     Family History   Problem Relation Age of Onset    Hypertension Mother     Melanoma Mother     Cancer Maternal Grandfather     Cancer Maternal Aunt     Colon polyps Maternal Aunt 28        polyposis; 68 polyps removed; hx adenomatous polyps    Cancer Other 82        rectal cancer    Colon cancer Other 65         Current Outpatient Medications:     clotrimazole-betamethasone (LOTRISONE) 1-0 05 % cream    norethindrone-ethinyl estradiol (MICROGESTIN 1/20) 1-20 MG-MCG per tablet    SUMAtriptan (IMITREX) 50 mg tablet    topiramate (TOPAMAX) 25 mg sprinkle capsule    valACYclovir (VALTREX) 1,000 mg tablet  No Known Allergies  Reviewed medications and allergies and updated as indicated    PHYSICAL EXAM:    Blood pressure 136/86, pulse 84, height 5' 6" (1 676 m), weight 84 kg (185 lb 3 2 oz)  Body mass index is 29 89 kg/m²  General Appearance: NAD, cooperative, alert  Eyes: Anicteric, PERRLA, EOMI  ENT:  Normocephalic, atraumatic, normal mucosa  Neck:  Supple, symmetrical, trachea midline  Resp:  Clear to auscultation bilaterally; no rales, rhonchi or wheezing; respirations unlabored   CV:  S1 S2, Regular rate and rhythm; no murmur, rub, or gallop  GI:  Soft, +right and left lower quadrant abdominal ttp, no guarding or rebound, normal bowel sounds; no masses, no organomegaly   Rectal: Deferred  Musculoskeletal: No cyanosis, clubbing or edema  Normal ROM    Skin:  No jaundice, rashes, or lesions   Heme/Lymph: No palpable cervical lymphadenopathy  Psych: Normal affect, good eye contact  Neuro: No gross deficits, AAOx3    Lab Results:   No results found for: WBC, HGB, HCT, MCV, PLT  No results found for: NA, K, CL, CO2, ANIONGAP, BUN, CREATININE, GLUCOSE, GLUF, CALCIUM, CORRECTEDCA, AST, ALT, ALKPHOS, PROT, BILITOT, EGFR  No results found for: IRON, TIBC, FERRITIN  No results found for: LIPASE    Radiology Results:   No results found  normal...

## 2025-04-25 NOTE — ED PROVIDER NOTE - CLINICAL SUMMARY MEDICAL DECISION MAKING FREE TEXT BOX
Neutropenic fever rule out appendicitis vs sepsis  Plan for CBC, CMP, lactate cultures, ABx, CT abd, and admit

## 2025-04-25 NOTE — ED PROVIDER NOTE - OBJECTIVE STATEMENT
65 y/o F with PMHx of DM2, HTN, COPD, hypothyroidism, anal CA, osteoarthritis presents to the ED c/o fever of 101. Pt undergoing treatment for anal CA, last chemo yesterday. Earlier in week pt found to be neutropenic. Pt was last seen at ED a month ago but with more abd pain, today less so. Oncologist sent pt to ED for neutropenic fever.

## 2025-04-25 NOTE — ED ADULT NURSE NOTE - NSICDXPASTSURGICALHX_GEN_ALL_CORE_FT
PAST SURGICAL HISTORY:  History of total hip replacement, right 2011, [facility]

## 2025-04-25 NOTE — ED ADULT TRIAGE NOTE - CHIEF COMPLAINT QUOTE
Pt comes to the ED complaining of fever of 102. Pt is on percocet 5mg/325mg every 4 hours. Pt states that she last took it at 3pm. Pt is undergoing treatment for anal CA and had radiation yesterday.

## 2025-04-25 NOTE — ED PROVIDER NOTE - NSICDXPASTSURGICALHX_GEN_ALL_CORE_FT
PAST SURGICAL HISTORY:  History of total hip replacement, right 2011, [facility]

## 2025-04-26 LAB
ADD ON TEST-SPECIMEN IN LAB: SIGNIFICANT CHANGE UP
ALBUMIN SERPL ELPH-MCNC: 2.5 G/DL — LOW (ref 3.3–5)
ALP SERPL-CCNC: 58 U/L — SIGNIFICANT CHANGE UP (ref 40–120)
ALT FLD-CCNC: 30 U/L — SIGNIFICANT CHANGE UP (ref 12–78)
ANION GAP SERPL CALC-SCNC: 5 MMOL/L — SIGNIFICANT CHANGE UP (ref 5–17)
ANISOCYTOSIS BLD QL: SLIGHT — SIGNIFICANT CHANGE UP
AST SERPL-CCNC: 27 U/L — SIGNIFICANT CHANGE UP (ref 15–37)
BASOPHILS # BLD AUTO: 0.02 K/UL — SIGNIFICANT CHANGE UP (ref 0–0.2)
BASOPHILS # BLD MANUAL: 0.01 K/UL — SIGNIFICANT CHANGE UP (ref 0–0.2)
BASOPHILS NFR BLD AUTO: 1.3 % — SIGNIFICANT CHANGE UP (ref 0–2)
BASOPHILS NFR BLD MANUAL: 1 % — SIGNIFICANT CHANGE UP (ref 0–2)
BILIRUB SERPL-MCNC: 0.4 MG/DL — SIGNIFICANT CHANGE UP (ref 0.2–1.2)
BUN SERPL-MCNC: 4 MG/DL — LOW (ref 7–23)
CALCIUM SERPL-MCNC: 8.1 MG/DL — LOW (ref 8.5–10.1)
CHLORIDE SERPL-SCNC: 105 MMOL/L — SIGNIFICANT CHANGE UP (ref 96–108)
CO2 SERPL-SCNC: 24 MMOL/L — SIGNIFICANT CHANGE UP (ref 22–31)
CREAT SERPL-MCNC: 0.45 MG/DL — LOW (ref 0.5–1.3)
EGFR: 107 ML/MIN/1.73M2 — SIGNIFICANT CHANGE UP
EGFR: 107 ML/MIN/1.73M2 — SIGNIFICANT CHANGE UP
EOSINOPHIL # BLD AUTO: 0.01 K/UL — SIGNIFICANT CHANGE UP (ref 0–0.5)
EOSINOPHIL # BLD MANUAL: 0 K/UL — SIGNIFICANT CHANGE UP (ref 0–0.5)
EOSINOPHIL NFR BLD AUTO: 0.7 % — SIGNIFICANT CHANGE UP (ref 0–6)
EOSINOPHIL NFR BLD MANUAL: 0 % — SIGNIFICANT CHANGE UP (ref 0–6)
GLUCOSE SERPL-MCNC: 100 MG/DL — HIGH (ref 70–99)
HCT VFR BLD CALC: 26.4 % — LOW (ref 34.5–45)
HCT VFR BLD CALC: SIGNIFICANT CHANGE UP (ref 34.5–45)
HGB BLD-MCNC: 8.9 G/DL — LOW (ref 11.5–15.5)
HGB BLD-MCNC: SIGNIFICANT CHANGE UP (ref 11.5–15.5)
IMM GRANULOCYTES # BLD AUTO: 0.05 K/UL — SIGNIFICANT CHANGE UP (ref 0–0.07)
IMM GRANULOCYTES NFR BLD AUTO: 3.3 % — HIGH (ref 0–0.9)
IMMATURE PLATELET FRACTION #: 0.6 K/UL — LOW (ref 4.7–11.1)
IMMATURE PLATELET FRACTION %: 1 % — LOW (ref 1.6–4.9)
LYMPHOCYTES # BLD AUTO: 0.26 K/UL — LOW (ref 1–3.3)
LYMPHOCYTES # BLD MANUAL: 0.23 K/UL — LOW (ref 1–3.3)
LYMPHOCYTES NFR BLD AUTO: 17 % — SIGNIFICANT CHANGE UP (ref 13–44)
LYMPHOCYTES NFR BLD MANUAL: 16 % — SIGNIFICANT CHANGE UP (ref 13–44)
MACROCYTES BLD QL: SLIGHT — SIGNIFICANT CHANGE UP
MANUAL METAMYELOCYTE #: 0.01 K/UL — HIGH (ref 0–0)
MANUAL NEUTROPHIL BANDS #: 0.07 K/UL — SIGNIFICANT CHANGE UP (ref 0–0.84)
MANUAL SMEAR VERIFICATION: SIGNIFICANT CHANGE UP
MCHC RBC-ENTMCNC: 28.5 PG — SIGNIFICANT CHANGE UP (ref 27–34)
MCHC RBC-ENTMCNC: 33.7 G/DL — SIGNIFICANT CHANGE UP (ref 32–36)
MCHC RBC-ENTMCNC: SIGNIFICANT CHANGE UP (ref 27–34)
MCHC RBC-ENTMCNC: SIGNIFICANT CHANGE UP G/DL (ref 32–36)
MCV RBC AUTO: 84.6 FL — SIGNIFICANT CHANGE UP (ref 80–100)
MCV RBC AUTO: SIGNIFICANT CHANGE UP (ref 80–100)
METAMYELOCYTES # FLD: 1 % — HIGH (ref 0–0)
METAMYELOCYTES NFR BLD: 1 % — HIGH (ref 0–0)
MICROCYTES BLD QL: SLIGHT — SIGNIFICANT CHANGE UP
MONOCYTES # BLD AUTO: 0.22 K/UL — SIGNIFICANT CHANGE UP (ref 0–0.9)
MONOCYTES # BLD MANUAL: 0.12 K/UL — SIGNIFICANT CHANGE UP (ref 0–0.9)
MONOCYTES NFR BLD AUTO: 14.4 % — HIGH (ref 2–14)
MONOCYTES NFR BLD MANUAL: 8 % — SIGNIFICANT CHANGE UP (ref 2–14)
NEUTROPHILS # BLD AUTO: 0.97 K/UL — LOW (ref 1.8–7.4)
NEUTROPHILS # BLD MANUAL: 1.01 K/UL — LOW (ref 1.8–7.4)
NEUTROPHILS NFR BLD AUTO: 63.3 % — SIGNIFICANT CHANGE UP (ref 43–77)
NEUTROPHILS NFR BLD MANUAL: 69 % — SIGNIFICANT CHANGE UP (ref 43–77)
NEUTS BAND # BLD: 5 % — SIGNIFICANT CHANGE UP (ref 0–8)
NEUTS BAND NFR BLD: 5 % — SIGNIFICANT CHANGE UP (ref 0–8)
NRBC # BLD AUTO: 0 K/UL — SIGNIFICANT CHANGE UP (ref 0–0)
NRBC # FLD: 0 K/UL — SIGNIFICANT CHANGE UP (ref 0–0)
NRBC BLD AUTO-RTO: 0 /100 WBCS — SIGNIFICANT CHANGE UP (ref 0–0)
PLAT MORPH BLD: NORMAL — SIGNIFICANT CHANGE UP
PLATELET # BLD AUTO: 57 K/UL — LOW (ref 150–400)
PLATELET # BLD AUTO: SIGNIFICANT CHANGE UP K/UL (ref 150–400)
PMV BLD: 9.3 FL — SIGNIFICANT CHANGE UP (ref 7–13)
POTASSIUM SERPL-MCNC: 3.5 MMOL/L — SIGNIFICANT CHANGE UP (ref 3.5–5.3)
POTASSIUM SERPL-SCNC: 3.5 MMOL/L — SIGNIFICANT CHANGE UP (ref 3.5–5.3)
PROT SERPL-MCNC: 5 GM/DL — LOW (ref 6–8.3)
RBC # BLD: 3.12 M/UL — LOW (ref 3.8–5.2)
RBC # BLD: SIGNIFICANT CHANGE UP (ref 3.8–5.2)
RBC # FLD: 17.9 % — HIGH (ref 10.3–14.5)
RBC # FLD: SIGNIFICANT CHANGE UP % (ref 10.3–14.5)
RBC BLD AUTO: NORMAL — SIGNIFICANT CHANGE UP
SODIUM SERPL-SCNC: 134 MMOL/L — LOW (ref 135–145)
WBC # BLD: 1.46 K/UL — LOW (ref 3.8–10.5)
WBC # BLD: SIGNIFICANT CHANGE UP K/UL (ref 3.8–10.5)
WBC # FLD AUTO: 1.46 K/UL — LOW (ref 3.8–10.5)
WBC # FLD AUTO: SIGNIFICANT CHANGE UP K/UL (ref 3.8–10.5)
WBC MORPHOLOGY: ABNORMAL

## 2025-04-26 PROCEDURE — 99223 1ST HOSP IP/OBS HIGH 75: CPT

## 2025-04-26 RX ORDER — DEXTROSE 50 % IN WATER 50 %
15 SYRINGE (ML) INTRAVENOUS ONCE
Refills: 0 | Status: DISCONTINUED | OUTPATIENT
Start: 2025-04-26 | End: 2025-04-29

## 2025-04-26 RX ORDER — HYDROMORPHONE/SOD CHLOR,ISO/PF 2 MG/10 ML
2 SYRINGE (ML) INJECTION EVERY 4 HOURS
Refills: 0 | Status: DISCONTINUED | OUTPATIENT
Start: 2025-04-26 | End: 2025-04-27

## 2025-04-26 RX ORDER — ONDANSETRON HCL/PF 4 MG/2 ML
4 VIAL (ML) INJECTION EVERY 8 HOURS
Refills: 0 | Status: DISCONTINUED | OUTPATIENT
Start: 2025-04-26 | End: 2025-04-29

## 2025-04-26 RX ORDER — HEPARIN SODIUM 1000 [USP'U]/ML
5000 INJECTION INTRAVENOUS; SUBCUTANEOUS EVERY 8 HOURS
Refills: 0 | Status: DISCONTINUED | OUTPATIENT
Start: 2025-04-26 | End: 2025-04-28

## 2025-04-26 RX ORDER — CEFEPIME 2 G/20ML
2000 INJECTION, POWDER, FOR SOLUTION INTRAVENOUS EVERY 12 HOURS
Refills: 0 | Status: DISCONTINUED | OUTPATIENT
Start: 2025-04-26 | End: 2025-04-26

## 2025-04-26 RX ORDER — SILVER SULFADIAZINE 1 %
1 CREAM (GRAM) TOPICAL
Refills: 0 | Status: DISCONTINUED | OUTPATIENT
Start: 2025-04-26 | End: 2025-04-29

## 2025-04-26 RX ORDER — ACETAMINOPHEN 500 MG/5ML
650 LIQUID (ML) ORAL EVERY 6 HOURS
Refills: 0 | Status: DISCONTINUED | OUTPATIENT
Start: 2025-04-26 | End: 2025-04-29

## 2025-04-26 RX ORDER — CEFEPIME 2 G/20ML
2000 INJECTION, POWDER, FOR SOLUTION INTRAVENOUS EVERY 8 HOURS
Refills: 0 | Status: DISCONTINUED | OUTPATIENT
Start: 2025-04-26 | End: 2025-04-26

## 2025-04-26 RX ORDER — SODIUM CHLORIDE 9 G/1000ML
1000 INJECTION, SOLUTION INTRAVENOUS
Refills: 0 | Status: DISCONTINUED | OUTPATIENT
Start: 2025-04-26 | End: 2025-04-29

## 2025-04-26 RX ORDER — PIPERACILLIN-TAZO-DEXTROSE,ISO 2.25G/50ML
3.38 IV SOLUTION, PIGGYBACK PREMIX FROZEN(ML) INTRAVENOUS ONCE
Refills: 0 | Status: COMPLETED | OUTPATIENT
Start: 2025-04-26 | End: 2025-04-27

## 2025-04-26 RX ORDER — SODIUM CHLORIDE 9 G/1000ML
1000 INJECTION, SOLUTION INTRAVENOUS
Refills: 0 | Status: DISCONTINUED | OUTPATIENT
Start: 2025-04-26 | End: 2025-04-26

## 2025-04-26 RX ORDER — DEXTROSE 50 % IN WATER 50 %
12.5 SYRINGE (ML) INTRAVENOUS ONCE
Refills: 0 | Status: DISCONTINUED | OUTPATIENT
Start: 2025-04-26 | End: 2025-04-29

## 2025-04-26 RX ORDER — HYDROMORPHONE/SOD CHLOR,ISO/PF 2 MG/10 ML
1 SYRINGE (ML) INJECTION EVERY 4 HOURS
Refills: 0 | Status: DISCONTINUED | OUTPATIENT
Start: 2025-04-26 | End: 2025-04-27

## 2025-04-26 RX ORDER — DEXTROSE 50 % IN WATER 50 %
25 SYRINGE (ML) INTRAVENOUS ONCE
Refills: 0 | Status: DISCONTINUED | OUTPATIENT
Start: 2025-04-26 | End: 2025-04-29

## 2025-04-26 RX ORDER — GLUCAGON 3 MG/1
1 POWDER NASAL ONCE
Refills: 0 | Status: DISCONTINUED | OUTPATIENT
Start: 2025-04-26 | End: 2025-04-29

## 2025-04-26 RX ORDER — MELATONIN 5 MG
3 TABLET ORAL AT BEDTIME
Refills: 0 | Status: DISCONTINUED | OUTPATIENT
Start: 2025-04-26 | End: 2025-04-29

## 2025-04-26 RX ORDER — MAGNESIUM, ALUMINUM HYDROXIDE 200-200 MG
30 TABLET,CHEWABLE ORAL EVERY 4 HOURS
Refills: 0 | Status: DISCONTINUED | OUTPATIENT
Start: 2025-04-26 | End: 2025-04-29

## 2025-04-26 RX ORDER — LOPERAMIDE HCL 1 MG/7.5ML
2 SOLUTION ORAL
Refills: 0 | Status: DISCONTINUED | OUTPATIENT
Start: 2025-04-26 | End: 2025-04-29

## 2025-04-26 RX ORDER — LEVOTHYROXINE SODIUM 300 MCG
112 TABLET ORAL DAILY
Refills: 0 | Status: DISCONTINUED | OUTPATIENT
Start: 2025-04-26 | End: 2025-04-29

## 2025-04-26 RX ORDER — ACETAMINOPHEN 500 MG/5ML
1000 LIQUID (ML) ORAL ONCE
Refills: 0 | Status: COMPLETED | OUTPATIENT
Start: 2025-04-26 | End: 2025-04-26

## 2025-04-26 RX ORDER — LIDOCAINE HYDROCHLORIDE 20 MG/ML
1 JELLY TOPICAL EVERY 6 HOURS
Refills: 0 | Status: DISCONTINUED | OUTPATIENT
Start: 2025-04-26 | End: 2025-04-29

## 2025-04-26 RX ORDER — TIOTROPIUM BROMIDE INHALATION SPRAY 3.12 UG/1
2 SPRAY, METERED RESPIRATORY (INHALATION) DAILY
Refills: 0 | Status: DISCONTINUED | OUTPATIENT
Start: 2025-04-26 | End: 2025-04-29

## 2025-04-26 RX ORDER — PIPERACILLIN-TAZO-DEXTROSE,ISO 2.25G/50ML
3.38 IV SOLUTION, PIGGYBACK PREMIX FROZEN(ML) INTRAVENOUS EVERY 8 HOURS
Refills: 0 | Status: DISCONTINUED | OUTPATIENT
Start: 2025-04-27 | End: 2025-04-29

## 2025-04-26 RX ORDER — WITCH HAZEL LEAF
1 FLUID EXTRACT MISCELLANEOUS
Refills: 0 | Status: DISCONTINUED | OUTPATIENT
Start: 2025-04-26 | End: 2025-04-29

## 2025-04-26 RX ORDER — PIPERACILLIN-TAZO-DEXTROSE,ISO 2.25G/50ML
3.38 IV SOLUTION, PIGGYBACK PREMIX FROZEN(ML) INTRAVENOUS ONCE
Refills: 0 | Status: COMPLETED | OUTPATIENT
Start: 2025-04-26 | End: 2025-04-26

## 2025-04-26 RX ADMIN — Medication 1 MILLIGRAM(S): at 02:52

## 2025-04-26 RX ADMIN — CEFEPIME 2000 MILLIGRAM(S): 2 INJECTION, POWDER, FOR SOLUTION INTRAVENOUS at 13:06

## 2025-04-26 RX ADMIN — Medication 200 GRAM(S): at 17:47

## 2025-04-26 RX ADMIN — Medication 20 MILLIEQUIVALENT(S): at 02:22

## 2025-04-26 RX ADMIN — Medication 1000 MILLIGRAM(S): at 21:17

## 2025-04-26 RX ADMIN — Medication 20 MILLIEQUIVALENT(S): at 05:36

## 2025-04-26 RX ADMIN — Medication 1 MILLIGRAM(S): at 20:12

## 2025-04-26 RX ADMIN — Medication 2 MILLIGRAM(S): at 17:21

## 2025-04-26 RX ADMIN — Medication 650 MILLIGRAM(S): at 17:47

## 2025-04-26 RX ADMIN — Medication 1 MILLIGRAM(S): at 06:28

## 2025-04-26 RX ADMIN — Medication 400 MILLIGRAM(S): at 20:17

## 2025-04-26 RX ADMIN — Medication 1 MILLIGRAM(S): at 02:22

## 2025-04-26 RX ADMIN — HEPARIN SODIUM 5000 UNIT(S): 1000 INJECTION INTRAVENOUS; SUBCUTANEOUS at 20:16

## 2025-04-26 RX ADMIN — Medication 112 MICROGRAM(S): at 05:36

## 2025-04-26 RX ADMIN — Medication 1 MILLIGRAM(S): at 14:46

## 2025-04-26 RX ADMIN — Medication 75 MILLILITER(S): at 18:32

## 2025-04-26 RX ADMIN — Medication 1 MILLIGRAM(S): at 16:22

## 2025-04-26 RX ADMIN — Medication 1 APPLICATION(S): at 20:21

## 2025-04-26 RX ADMIN — Medication 1000 MILLILITER(S): at 13:05

## 2025-04-26 RX ADMIN — Medication 1 APPLICATION(S): at 17:47

## 2025-04-26 RX ADMIN — Medication 500 MILLILITER(S): at 02:23

## 2025-04-26 RX ADMIN — Medication 1 MILLIGRAM(S): at 19:42

## 2025-04-26 RX ADMIN — Medication 1 MILLIGRAM(S): at 06:59

## 2025-04-26 RX ADMIN — Medication 650 MILLIGRAM(S): at 10:27

## 2025-04-26 RX ADMIN — Medication 650 MILLIGRAM(S): at 11:22

## 2025-04-26 RX ADMIN — Medication 4 MILLIGRAM(S): at 06:28

## 2025-04-26 NOTE — DIETITIAN INITIAL EVALUATION ADULT - PERTINENT LABORATORY DATA
04-26    134[L]  |  105  |  4[L]  ----------------------------<  100[H]  3.5   |  24  |  0.45[L]    Ca    8.1[L]      26 Apr 2025 06:48    TPro  5.0[L]  /  Alb  2.5[L]  /  TBili  0.4  /  DBili  x   /  AST  27  /  ALT  30  /  AlkPhos  58  04-26  A1C with Estimated Average Glucose Result: 5.6 % (03-31-25 @ 07:46)

## 2025-04-26 NOTE — DIETITIAN NUTRITION RISK NOTIFICATION - TREATMENT: THE FOLLOWING DIET HAS BEEN RECOMMENDED
Diet, Regular:   Supplement Feeding Modality:  Oral  Ensure Plant-Based Cans or Servings Per Day:  1       Frequency:  Two Times a day (04-26-25 @ 11:27) [Pending Verification By Attending]  Diet, Consistent Carbohydrate w/Evening Snack (04-26-25 @ 06:39) [Active]

## 2025-04-26 NOTE — DIETITIAN INITIAL EVALUATION ADULT - ORAL INTAKE PTA/DIET HISTORY
reports very poor PO intake/ appetite x "a few weeks" Reports intake of eggs (scrambled or hard boiled), yogurt, bananas, applesauce. Only bites of food at a time. Endorses early satiety, nausea, and mucus BMs. Constipation. Meeting <50% ENN x 3-4 weeks   Of note, does take anti-nausea medication at home with relief

## 2025-04-26 NOTE — DIETITIAN INITIAL EVALUATION ADULT - ADD RECOMMEND
1) Liberalize diet to regular to maximize caloric and nutrient intake - POCT/A1C WNL, 2) OrangeHRM BID to optimize nutritional needs (provides 325 kcal, 16 g protein/ shake), 3) MVI w/ minerals daily to ensure 100% RDA met, 4) Consider adding thiamine 100 mg daily 2/2 poor PO intake/ malnutrition, 5) Monitor bowel movements, if no BM for >3 days or diarrhea occurs, consider implementing standing bowel regimen, 6) Consider obtaining vitamin D 25OH level to assess nutriture, 7) consider checking B6, B12, thiamine, folate, carnitine, and copper levels as malnutrition in cause these to be deficient, 8) Confirm goals of care regarding nutrition support, 9) Consider adding appetite stimulant such as Remeron or Marinol 2/2 chronically poor appetite/ PO intake. RD will continue to monitor PO intake, labs, hydration, and wt prn.  1) Liberalize diet to regular to maximize caloric and nutrient intake - POCT/A1C WNL, 2) Jyoti Diez BID to optimize nutritional needs (provides 325 kcal, 16 g protein/ shake), 3) Recommend to add MVI w/minerals, Vit C 500 mg BID, add Zinc Sulfate 220 mg x 10 days to promote wound healing, 4) Consider adding thiamine 100 mg daily 2/2 poor PO intake/ malnutrition, 5) Monitor bowel movements, if no BM for >3 days or diarrhea occurs, consider implementing standing bowel regimen, 6) Consider obtaining vitamin D 25OH level to assess nutriture, 7) consider checking B6, B12, thiamine, folate, carnitine, and copper levels as malnutrition in cause these to be deficient, 8) Confirm goals of care regarding nutrition support, 9) Consider adding appetite stimulant such as Remeron or Marinol 2/2 chronically poor appetite/ PO intake. RD will continue to monitor PO intake, labs, hydration, and wt prn.

## 2025-04-26 NOTE — H&P ADULT - ASSESSMENT
63 y/o F w/ PMH of DM2, hypothyroidism, OA, anal cancer (s/p chemo / RT), COPD. Presenting with fevers that started this afternoon, max temperature 101. Pt had chemotherapy session yesterday. Pt denies coughing, headaches, nausea/vomiting, Pain also having 9/10 pain with bowel movements due to her rectal cancer. Pt had a similar presentation seen earlier this month. In the ED VSS,  wbc 1.74, hb 9.8, plt 72, na 133, K 3.3 Pt admitted for further work up.     # Neutropenic fever  # pancytopenia 2/2 chemo  # Rectal CA s/p chemo / RT    - neutropenic precautions  - Blood culture  - GI pcr  - Cefepime   - ID consulted  - Oncology consulted    # Hyponatremia  #hypokalemia  - replete    # Hypothyroidism  - Levothyroxine     # COPD  - Stable, on trelegy at home  - Start advair       # DM2  - A1c 5.6  - Observe off insulin     # HTN  - Continue losartan    # DVT prophylaxis  - SCDS

## 2025-04-26 NOTE — CONSULT NOTE ADULT - ASSESSMENT
63 y/o F w/ PMH of anal cancer s/p mitomycin, xeloda / RT completed 4/24/25, COPD presenting with fevers that started this afternoon, max temperature 101.     # anal cancer  - follows at [facility] [place]   - s/p completion of mitomycin, xeloda and RT wtih LD on Thursdasy 4/24/25   - anal irritation w/constipation alternating w/diarrhea - added topical lidocaine, silver sulfadiazine and aquafor for soreness  - dilaudid and percocet prn for pain   - on immodium for diarrhea     # neutropenic fever  - In the ED VSS,  wbc 1.74, hb 9.8, plt 72, na 133, K 3.3 and repleted  - viral panel negative   - UA negative   - CXR negative  - CT a/p without new acute finding   - pt on cefepime   - pt has been afebrile  - will plan to give neupogen if anc <500 and spikes another fever as it has been >24 hrs since last chemo dose   - send cbc with differential daily - counts to improve now off chemo     will follow daily

## 2025-04-26 NOTE — DIETITIAN INITIAL EVALUATION ADULT - PERTINENT MEDS FT
MEDICATIONS  (STANDING):  cefepime  Injectable. 2000 milliGRAM(s) IV Push every 8 hours  dextrose 5%. 1000 milliLiter(s) (100 mL/Hr) IV Continuous <Continuous>  dextrose 5%. 1000 milliLiter(s) (50 mL/Hr) IV Continuous <Continuous>  dextrose 50% Injectable 25 Gram(s) IV Push once  dextrose 50% Injectable 12.5 Gram(s) IV Push once  dextrose 50% Injectable 25 Gram(s) IV Push once  fluticasone propionate/ salmeterol 100-50 MICROgram(s) Diskus 1 Dose(s) Inhalation two times a day  glucagon  Injectable 1 milliGRAM(s) IntraMuscular once  levothyroxine 112 MICROGram(s) Oral daily  tiotropium 2.5 MICROgram(s) Inhaler 2 Puff(s) Inhalation daily    MEDICATIONS  (PRN):  acetaminophen     Tablet .. 650 milliGRAM(s) Oral every 6 hours PRN Temp greater or equal to 38C (100.4F), Mild Pain (1 - 3)  aluminum hydroxide/magnesium hydroxide/simethicone Suspension 30 milliLiter(s) Oral every 4 hours PRN Dyspepsia  dextrose Oral Gel 15 Gram(s) Oral once PRN Blood Glucose LESS THAN 70 milliGRAM(s)/deciliter  HYDROmorphone  Injectable 1 milliGRAM(s) IV Push every 4 hours PRN Severe Pain (7 - 10)  loperamide 2 milliGRAM(s) Oral every 3 hours PRN Diarrhea  melatonin 3 milliGRAM(s) Oral at bedtime PRN Insomnia  ondansetron Injectable 4 milliGRAM(s) IV Push every 8 hours PRN Nausea and/or Vomiting  oxycodone    5 mG/acetaminophen 325 mG 1 Tablet(s) Oral every 4 hours PRN Moderate Pain (4 - 6)

## 2025-04-26 NOTE — DIETITIAN NUTRITION RISK NOTIFICATION - ADDITIONAL COMMENTS/DIETITIAN RECOMMENDATIONS
1) Liberalize diet to regular to maximize caloric and nutrient intake - POCT/A1C WNL, 2) AkesoGenX BID to optimize nutritional needs (provides 325 kcal, 16 g protein/ shake), 3) MVI w/ minerals daily to ensure 100% RDA met, 4) Consider adding thiamine 100 mg daily 2/2 poor PO intake/ malnutrition, 5) Monitor bowel movements, if no BM for >3 days or diarrhea occurs, consider implementing standing bowel regimen, 6) Consider obtaining vitamin D 25OH level to assess nutriture, 7) consider checking B6, B12, thiamine, folate, carnitine, and copper levels as malnutrition in cause these to be deficient, 8) Confirm goals of care regarding nutrition support, 9) Consider adding appetite stimulant such as Remeron or Marinol 2/2 chronically poor appetite/ PO intake. RD will continue to monitor PO intake, labs, hydration, and wt prn.   1) Liberalize diet to regular to maximize caloric and nutrient intake - POCT/A1C WNL, 2) Jyoti Diez BID to optimize nutritional needs (provides 325 kcal, 16 g protein/ shake), 3) Recommend to add MVI w/minerals, Vit C 500 mg BID, add Zinc Sulfate 220 mg x 10 days to promote wound healing, 4) Consider adding thiamine 100 mg daily 2/2 poor PO intake/ malnutrition, 5) Monitor bowel movements, if no BM for >3 days or diarrhea occurs, consider implementing standing bowel regimen, 6) Consider obtaining vitamin D 25OH level to assess nutriture, 7) consider checking B6, B12, thiamine, folate, carnitine, and copper levels as malnutrition in cause these to be deficient, 8) Confirm goals of care regarding nutrition support, 9) Consider adding appetite stimulant such as Remeron or Marinol 2/2 chronically poor appetite/ PO intake. RD will continue to monitor PO intake, labs, hydration, and wt prn.

## 2025-04-26 NOTE — DIETITIAN INITIAL EVALUATION ADULT - NSICDXPASTSURGICALHX_GEN_ALL_CORE_FT
PAST SURGICAL HISTORY:  History of total hip replacement, right 2011, [facility]

## 2025-04-26 NOTE — H&P ADULT - HISTORY OF PRESENT ILLNESS
HPI:  63 y/o F w/ PMH of DM2, hypothyroidism, OA, anal cancer (s/p chemo / RT), COPD. Presenting with fevers that started this afternoon, max temperature 101. Pt had chemotherapy session yesterday. Pt denies coughing, headaches, nausea/vomiting, Pain also having 9/10 pain with bowel movements due to her rectal cancer. Pt had a similar presentation seen earlier this month. In the ED VSS,  wbc 1.74, hb 9.8, plt 72, na 133, K 3.3 Pt admitted for further work up.       PAST MEDICAL & SURGICAL HISTORY:  Osteoarthritis  s/p R THR       Hypothyroidism      DM2 (diabetes mellitus, type 2)      White coat syndrome without diagnosis of hypertension      History of total hip replacement, right  , [facility]        FAMILY HISTORY:  FH: pancreatic cancer (Father)    FH: COPD (chronic obstructive pulmonary disease) (Mother)      Social History:      Allergies    No Known Allergies    Intolerances        MEDICATIONS  (STANDING):  cefepime  Injectable. 2000 milliGRAM(s) IV Push every 12 hours  fluticasone propionate/ salmeterol 100-50 MICROgram(s) Diskus 1 Dose(s) Inhalation two times a day  levothyroxine 112 MICROGram(s) Oral daily  loperamide 2 milliGRAM(s) Oral every 3 hours  potassium chloride    Tablet ER 20 milliEquivalent(s) Oral every 2 hours  sodium chloride 0.9% Bolus 1000 milliLiter(s) IV Bolus once  tiotropium 2.5 MICROgram(s) Inhaler 2 Puff(s) Inhalation daily    MEDICATIONS  (PRN):  acetaminophen     Tablet .. 650 milliGRAM(s) Oral every 6 hours PRN Temp greater or equal to 38C (100.4F), Mild Pain (1 - 3)  aluminum hydroxide/magnesium hydroxide/simethicone Suspension 30 milliLiter(s) Oral every 4 hours PRN Dyspepsia  HYDROmorphone  Injectable 1 milliGRAM(s) IV Push every 4 hours PRN Severe Pain (7 - 10)  melatonin 3 milliGRAM(s) Oral at bedtime PRN Insomnia  ondansetron Injectable 4 milliGRAM(s) IV Push every 8 hours PRN Nausea and/or Vomiting  oxycodone    5 mG/acetaminophen 325 mG 1 Tablet(s) Oral every 4 hours PRN Moderate Pain (4 - 6)      ROS:  10 point ROS negative except for ones mentioned in HPI    PE   HEENT:  Head is normocephalic    Skin:  Warm and dry without any rash   NECK:  Supple without lymphadenopathy.   HEART:  Regular rate and rhythm. normal S1 and S2, No M/R/G  LUNGS:  Good ins/exp effort, no W/R/R/C  ABDOMEN:  Soft, nontender, nondistended with good bowel sounds heard  EXTREMITIES:  Without cyanosis, clubbing or edema.   NEUROLOGICAL:  Gross nonfocal      PEx  T(C): 37.2 (25 @ 01:25), Max: 37.6 (25 @ 20:04)  HR: 71 (25 @ 01:25) (63 - 80)  BP: 105/60 (25 @ 01:25) (96/64 - 142/69)  RR: 17 (25 @ 01:25) (17 - 20)  SpO2: 97% (25 @ 01:25) (96% - 97%)  Wt(kg): --                        9.8    1.74  )-----------( 72       ( 2025 20:41 )             29.2         133[L]  |  101  |  6[L]  ----------------------------<  108[H]  3.3[L]   |  25  |  0.54    Ca    8.5      2025 20:41    TPro  6.0  /  Alb  2.9[L]  /  TBili  0.6  /  DBili  x   /  AST  38[H]  /  ALT  35  /  AlkPhos  64  25    CAPILLARY BLOOD GLUCOSE        PT/INR - ( 2025 20:41 )   PT: 12.1 sec;   INR: 1.03 ratio         PTT - ( 2025 20:41 )  PTT:27.6 sec  Urinalysis Basic - ( 2025 21:23 )    Color: Yellow / Appearance: Clear / S.006 / pH: x  Gluc: x / Ketone: Negative mg/dL  / Bili: Negative / Urobili: 1.0 mg/dL   Blood: x / Protein: Negative mg/dL / Nitrite: Negative   Leuk Esterase: Trace / RBC: 15 /HPF / WBC 2 /HPF   Sq Epi: x / Non Sq Epi: 0 /HPF / Bacteria: Negative /HPF          Urinalysis with Rflx Culture (collected 25 @ 21:23)            Radiology/Imaging, I have personally reviewed:      IMPRESSION:    Stable examination when compared to the prior exam of 3/30/2025. No new   acute finding.

## 2025-04-26 NOTE — DIETITIAN INITIAL EVALUATION ADULT - LITERATURE/VIDEOS GIVEN
NCM High protein/kcal Nutr Therapy; High protein/kcal Recipes; Nutrition Therapy During and After Cancer Treatment Handouts

## 2025-04-26 NOTE — PROGRESS NOTE ADULT - ASSESSMENT
63 y/o F w/ PMH of DM2, hypothyroidism, OA, anal cancer (s/p chemo / RT), COPD admitted for:       # Neutropenic fever, Met SIRS criteria on admission.   fever 102 at home, leukopenia   CXR neg   UA neg   F/u UCX  neutropenic  precautions  CT A/P: no acute findings has  lower rectum and anus appear thickened and there is hyperemia of the mucosa.  D/w Dr Lopez will change Cefepime to Zosyn for better anaerobic coverage       # Pancytopenia 2/2 chemo  Rectal CA s/p chemo / RT  s/p completion of mitomycin, xeloda and RT wtih LD on Thursdasy 4/24/25   Likely radiation proctitis and dermatitis  Start silvadene , Lidocain and Witch hazel patches PRN   Change pain meds to PO Dulaudid 2mg Q4h and C/w IV Dilaudid Q4h   Tylenol PRN   D/w Dr Riojas, [facility] reviewed, has mild neutropenia, hold off on neurogen, labs daily        # Borderline Hypotension Multisectional:  dehydration, poor oral intake, infection and Pain meds  Trial of oral Dilaudid  Abxs adjusted  IVF bolus and Gnetle Hydration ordered  Monitor closely     # Hyponatremia  #hypokalemia  - replete and monitor     # Hypothyroidism  - C/w Levothyroxine     # COPD  - Stable, on trelegy at home  - Start advair and spiriva  here       # DM2  - A1c 5.6  - Observe off insulin     # HTN  - Continue losartan    # DVT prophylaxis  - SCDS, add heparin SQ as Ptis high risk

## 2025-04-26 NOTE — CONSULT NOTE ADULT - ASSESSMENT
Assessment:  64F with DM2, hypothyroidism, OA, anal cancer (s/p chemo / RT), COPD presents 4/26 with fever Tmax 101 after chemo the day prior  Overall, denies any cough, abdominal pain, n/v, dysuria.   No known recent sick contact  Afebrile on admission, on RA  WBC 1.46  Cr 0.54  UA negative  CTAP negative  CXR clear  RVP negative    Antimicrobials:  cefepime  Injectable. 2000 every 12 hours (4/25 --- )    Impression:   #Neutropenic fever  #Pancytopenia 2/2 chemo  #Rectal CA s/p chemo / RT    Recommendations:  - continue Cefepime 2G q8 (Day #2)  - monitor temperature curve  - trend WBC, ANC  - reason for abx use reviewed with patient  - side effects of antibiotic discussed, tolerating abx well so far  - Prior cultures reviewed. An epidemiologic assessment was performed. There is a significant risk for resistant microorganisms to spread to family members, and/or healthcare staff. Isolation precautions based on infection control policy. Will reconsider further isolation measures based on new culture results and other clinical data as appropriate Appropriate cultures collected and an appropriate broad spectrum antibiotic therapy will be considered  - follow up BCx x2  - follow up Onc  - rest per primary team    Clinical team may change from intravenous to oral antibiotics when the following criteria are met:   1. Patient is clinically improving/stable       a)	Improved signs and symptoms of infection from initial presentation       b)	Afebrile for 24 hours       c)	Leukocytosis trending towards normal range   2. Patient is tolerating oral intake   3. Initial/repeat blood cultures are negative OR do not need to wait for preliminary blood cultures to result    Cannot advise changing to oral antibiotic therapy until culture sensitivity is available.

## 2025-04-26 NOTE — PROGRESS NOTE ADULT - SUBJECTIVE AND OBJECTIVE BOX
CC: Neutropenia      HPI: 65 y/o F w/ PMH of DM2, hypothyroidism, OA, anal cancer (s/p chemo / RT), COPD. Presenting with fevers that started this afternoon, max temperature 101. Pt had chemotherapy session yesterday. Pt denies coughing, headaches, nausea/vomiting, Pain also having 9/10 pain with bowel movements due to her rectal cancer. Pt had a similar presentation seen earlier this month. In the ED VSS,  wbc 1.74, hb 9.8, plt 72, na 133, K 3.3 Pt admitted for further work up.      INTERVAL HPI/OVERNIGHT EVENTS: chart reviewed, Pt was seen and examined, confirmed histiry above, states was  very weak and temp was 102 yesterday, went to ED. No cough or SOB. Today  reports feels tired and  cold, has a lot of pain in the  rectum, no diarrhea but small amount  mucous and feeling like needs to go, also pain in perirectal area and perineum. Has decreased Apetite, reports IV  Dilaudid helps but doesnt last long. Trial of PO dilaudid discussed and Pt agrees. Also Local care discussed, Pt reports that lidocaine was helping before     Vital Signs Last 24 Hrs  T(C): 37.1 (26 Apr 2025 15:35), Max: 38.4 (26 Apr 2025 10:26)  T(F): 98.8 (26 Apr 2025 15:35), Max: 101.1 (26 Apr 2025 10:26)  HR: 70 (26 Apr 2025 15:35) (63 - 80)  BP: 100/53 (26 Apr 2025 15:35) (92/57 - 142/69)  BP(mean): 75 (26 Apr 2025 00:15) (75 - 92)  RR: 18 (26 Apr 2025 15:35) (17 - 20)  SpO2: 95% (26 Apr 2025 15:35) (95% - 100%)    Parameters below as of 26 Apr 2025 15:35  Patient On (Oxygen Delivery Method): room air    REVIEW OF SYSTEMS:  All other review of systems is negative unless indicated above.      PHYSICAL EXAM:  General: uncomfortable,  in no respiratory  distress  Eyes: EOMI; conjunctiva and sclera clear  Head: Normocephalic; atraumatic  ENMT: No nasal discharge; airway clear  Respiratory: No wheezes, rales or rhonchi  Cardiovascular: Regular rate and rhythm. S1 and S2 Normal;   Gastrointestinal: Soft non-tender non-distended; Normal bowel sounds  Genitourinary: No  suprapubic  tenderness  Extremities: No edema  Neurological: Alert and oriented x 3, non focal   Musculoskeletal: Normal muscle tone, without deformities  Psychiatric: Cooperative      LABS:                           8.9    1.46  )-----------( 57       ( 26 Apr 2025 06:48 )             26.4     26 Apr 2025 06:48    134    |  105    |  4      ----------------------------<  100    3.5     |  24     |  0.45     Ca    8.1        26 Apr 2025 06:48    TPro  5.0    /  Alb  2.5    /  TBili  0.4    /  DBili  x      /  AST  27     /  ALT  30     /  AlkPhos  58     26 Apr 2025 06:48    PT/INR - ( 25 Apr 2025 20:41 )   PT: 12.1 sec;   INR: 1.03 ratio         PTT - ( 25 Apr 2025 20:41 )  PTT:27.6 sec  CAPILLARY BLOOD GLUCOSE        LIVER FUNCTIONS - ( 26 Apr 2025 06:48 )  Alb: 2.5 g/dL / Pro: 5.0 gm/dL / ALK PHOS: 58 U/L / ALT: 30 U/L / AST: 27 U/L / GGT: x           Urinalysis Basic - ( 26 Apr 2025 06:48 )    Color: x / Appearance: x / SG: x / pH: x  Gluc: 100 mg/dL / Ketone: x  / Bili: x / Urobili: x   Blood: x / Protein: x / Nitrite: x   Leuk Esterase: x / RBC: x / WBC x   Sq Epi: x / Non Sq Epi: x / Bacteria: x        MEDICATIONS  (STANDING):  dextrose 5%. 1000 milliLiter(s) (100 mL/Hr) IV Continuous <Continuous>  dextrose 5%. 1000 milliLiter(s) (50 mL/Hr) IV Continuous <Continuous>  dextrose 50% Injectable 25 Gram(s) IV Push once  dextrose 50% Injectable 12.5 Gram(s) IV Push once  dextrose 50% Injectable 25 Gram(s) IV Push once  fluticasone propionate/ salmeterol 100-50 MICROgram(s) Diskus 1 Dose(s) Inhalation two times a day  glucagon  Injectable 1 milliGRAM(s) IntraMuscular once  lactated ringers. 1000 milliLiter(s) (75 mL/Hr) IV Continuous <Continuous>  levothyroxine 112 MICROGram(s) Oral daily  piperacillin/tazobactam IVPB. 3.375 Gram(s) IV Intermittent once  piperacillin/tazobactam IVPB.- 3.375 Gram(s) IV Intermittent once  silver sulfADIAZINE 1% Cream 1 Application(s) Topical two times a day  tiotropium 2.5 MICROgram(s) Inhaler 2 Puff(s) Inhalation daily    MEDICATIONS  (PRN):  acetaminophen     Tablet .. 650 milliGRAM(s) Oral every 6 hours PRN Temp greater or equal to 38C (100.4F), Mild Pain (1 - 3)  aluminum hydroxide/magnesium hydroxide/simethicone Suspension 30 milliLiter(s) Oral every 4 hours PRN Dyspepsia  dextrose Oral Gel 15 Gram(s) Oral once PRN Blood Glucose LESS THAN 70 milliGRAM(s)/deciliter  HYDROmorphone   Tablet 2 milliGRAM(s) Oral every 4 hours PRN Moderate Pain (4 - 6)  HYDROmorphone  Injectable 1 milliGRAM(s) IV Push every 4 hours PRN Severe Pain (7 - 10)  lidocaine 5% Ointment 1 Application(s) Topical every 6 hours PRN pain  loperamide 2 milliGRAM(s) Oral every 3 hours PRN Diarrhea  melatonin 3 milliGRAM(s) Oral at bedtime PRN Insomnia  ondansetron Injectable 4 milliGRAM(s) IV Push every 8 hours PRN Nausea and/or Vomiting  witch hazel Pads 1 Application(s) Topical four times a day PRN pain      RADIOLOGY & ADDITIONAL TESTS:      ACC: [id number] EXAM:  CT ABDOMEN AND PELVIS IC   ORDERED BY: JOSÉ MIGUEL IRAHETA     PROCEDURE DATE:  04/25/2025          INTERPRETATION:  CLINICAL INFORMATION: Lower abdominal pain. History of   anal cancer on chemotherapy.    COMPARISON: CT abdomen and pelvis 3/30/2025.    CONTRAST/COMPLICATIONS:  IV Contrast: Omnipaque 350  90 cc administered   0 cc discarded  Oral Contrast: NONE  .    PROCEDURE:  CT of the Abdomen and Pelvis was performed.  Sagittal and coronal reformats were performed.    FINDINGS:  LOWER CHEST: Within normal limits.    LIVER: Within normal limits.  BILE DUCTS: Normal caliber.  GALLBLADDER: Within normal limits.  SPLEEN: Within normal limits.  PANCREAS: Within normal limits.  ADRENALS: Within normal limits.  KIDNEYS/URETERS: Small left renal cyst.    BLADDER: Within normal limits.  REPRODUCTIVE ORGANS: Uterus and adnexa within normal limits.    BOWEL: Sigmoid diverticulosis and thickening of the sigmoid colon   possibly on the basis of chronic diverticular disease. No evidence for   acute diverticulitis. The lower rectum and anus appear thickened and   there is hyperemia of the mucosa. This may be related to post radiation   changes. Similar to prior exam. Evaluation limited secondary to streak   artifact from right hip prosthesis. No bowel obstruction. Appendix is   normal.  PERITONEUM/RETROPERITONEUM: Within normal limits.  VESSELS: Atherosclerotic changes.  LYMPH NODES: No lymphadenopathy.  ABDOMINAL WALL: Within normal limits.  BONES: Degenerative changes. Right hip replacement.    IMPRESSION:    Stable examination when compared to the prior exam of 3/30/2025. No new   acute finding.

## 2025-04-26 NOTE — PATIENT PROFILE ADULT - FUNCTIONAL ASSESSMENT - DAILY ACTIVITY 1.
(I am covering for Dirk Smith CNP who is away from the office today.)    Interaction is a decrease in Ranexa levels. This is safe and okay to continue.      Thank you, Radha Mccormick
Drug interaction between primidone(not on our list of medications) and Ranexa. Please advise.
4 = No assist / stand by assistance

## 2025-04-26 NOTE — CONSULT NOTE ADULT - SUBJECTIVE AND OBJECTIVE BOX
HPI:    65 y/o F w/ PMH of anal cancer s/p mitomycin, xeloda / RT completed 25, COPD presenting with fevers that started this afternoon, max temperature 101.     Pt denies coughing, headaches, nausea/vomiting, Pain also having 9/10 pain with bowel movements due to her rectal cancer. Pt had a similar presentation seen earlier this month.     In the ED VSS,  wbc 1.74, hb 9.8, plt 72, na 133, K 3.3 and repleted  viral panel negative   UA negative   CXR negative  CT a/p without new acute finding   pt on cefepime   dilaudid for pain         PAST MEDICAL & SURGICAL HISTORY:  Osteoarthritis  s/p R THR       Hypothyroidism      DM2 (diabetes mellitus, type 2)      White coat syndrome without diagnosis of hypertension      History of total hip replacement, right  , [facility]        FAMILY HISTORY:  FH: pancreatic cancer (Father)    FH: COPD (chronic obstructive pulmonary disease) (Mother)      Social History:      Allergies    No Known Allergies    Intolerances        MEDICATIONS  (STANDING):  cefepime  Injectable. 2000 milliGRAM(s) IV Push every 12 hours  fluticasone propionate/ salmeterol 100-50 MICROgram(s) Diskus 1 Dose(s) Inhalation two times a day  levothyroxine 112 MICROGram(s) Oral daily  loperamide 2 milliGRAM(s) Oral every 3 hours  potassium chloride    Tablet ER 20 milliEquivalent(s) Oral every 2 hours  sodium chloride 0.9% Bolus 1000 milliLiter(s) IV Bolus once  tiotropium 2.5 MICROgram(s) Inhaler 2 Puff(s) Inhalation daily    MEDICATIONS  (PRN):  acetaminophen     Tablet .. 650 milliGRAM(s) Oral every 6 hours PRN Temp greater or equal to 38C (100.4F), Mild Pain (1 - 3)  aluminum hydroxide/magnesium hydroxide/simethicone Suspension 30 milliLiter(s) Oral every 4 hours PRN Dyspepsia  HYDROmorphone  Injectable 1 milliGRAM(s) IV Push every 4 hours PRN Severe Pain (7 - 10)  melatonin 3 milliGRAM(s) Oral at bedtime PRN Insomnia  ondansetron Injectable 4 milliGRAM(s) IV Push every 8 hours PRN Nausea and/or Vomiting  oxycodone    5 mG/acetaminophen 325 mG 1 Tablet(s) Oral every 4 hours PRN Moderate Pain (4 - 6)        PEx  T(C): 37.2 (25 @ 01:25), Max: 37.6 (25 @ 20:04)  HR: 71 (25 @ 01:25) (63 - 80)  BP: 105/60 (25 @ 01:25) (96/64 - 142/69)  RR: 17 (25 @ 01:25) (17 - 20)  SpO2: 97% (25 @ 01:25) (96% - 97%)  Wt(kg): --                        9.8    1.74  )-----------( 72       ( 2025 20:41 )             29.2         133[L]  |  101  |  6[L]  ----------------------------<  108[H]  3.3[L]   |  25  |  0.54    Ca    8.5      2025 20:41    TPro  6.0  /  Alb  2.9[L]  /  TBili  0.6  /  DBili  x   /  AST  38[H]  /  ALT  35  /  AlkPhos  64      CAPILLARY BLOOD GLUCOSE        PT/INR - ( 2025 20:41 )   PT: 12.1 sec;   INR: 1.03 ratio         PTT - ( 2025 20:41 )  PTT:27.6 sec  Urinalysis Basic - ( 2025 21:23 )    Color: Yellow / Appearance: Clear / S.006 / pH: x  Gluc: x / Ketone: Negative mg/dL  / Bili: Negative / Urobili: 1.0 mg/dL   Blood: x / Protein: Negative mg/dL / Nitrite: Negative   Leuk Esterase: Trace / RBC: 15 /HPF / WBC 2 /HPF   Sq Epi: x / Non Sq Epi: 0 /HPF / Bacteria: Negative /HPF          Urinalysis with Rflx Culture (collected 25 @ 21:23)            Radiology/Imaging, I have personally reviewed:      IMPRESSION:    Stable examination when compared to the prior exam of 3/30/2025. No new   acute finding.   (2025 02:08)      PAST MEDICAL & SURGICAL HISTORY:  Osteoarthritis  s/p R THR       Hypothyroidism      DM2 (diabetes mellitus, type 2)      White coat syndrome without diagnosis of hypertension      History of total hip replacement, right  , [facility]          Allergies    No Known Allergies    Intolerances        MEDICATIONS  (STANDING):  cefepime  Injectable. 2000 milliGRAM(s) IV Push every 8 hours  dextrose 5%. 1000 milliLiter(s) (100 mL/Hr) IV Continuous <Continuous>  dextrose 5%. 1000 milliLiter(s) (50 mL/Hr) IV Continuous <Continuous>  dextrose 50% Injectable 25 Gram(s) IV Push once  dextrose 50% Injectable 12.5 Gram(s) IV Push once  dextrose 50% Injectable 25 Gram(s) IV Push once  fluticasone propionate/ salmeterol 100-50 MICROgram(s) Diskus 1 Dose(s) Inhalation two times a day  glucagon  Injectable 1 milliGRAM(s) IntraMuscular once  levothyroxine 112 MICROGram(s) Oral daily  tiotropium 2.5 MICROgram(s) Inhaler 2 Puff(s) Inhalation daily    MEDICATIONS  (PRN):  acetaminophen     Tablet .. 650 milliGRAM(s) Oral every 6 hours PRN Temp greater or equal to 38C (100.4F), Mild Pain (1 - 3)  aluminum hydroxide/magnesium hydroxide/simethicone Suspension 30 milliLiter(s) Oral every 4 hours PRN Dyspepsia  dextrose Oral Gel 15 Gram(s) Oral once PRN Blood Glucose LESS THAN 70 milliGRAM(s)/deciliter  HYDROmorphone  Injectable 1 milliGRAM(s) IV Push every 4 hours PRN Severe Pain (7 - 10)  loperamide 2 milliGRAM(s) Oral every 3 hours PRN Diarrhea  melatonin 3 milliGRAM(s) Oral at bedtime PRN Insomnia  ondansetron Injectable 4 milliGRAM(s) IV Push every 8 hours PRN Nausea and/or Vomiting  oxycodone    5 mG/acetaminophen 325 mG 1 Tablet(s) Oral every 4 hours PRN Moderate Pain (4 - 6)      FAMILY HISTORY:  FH: pancreatic cancer (Father)    FH: COPD (chronic obstructive pulmonary disease) (Mother)        SOCIAL HISTORY: No EtOH, no tobacco    REVIEW OF SYSTEMS:    CONSTITUTIONAL: + weakness,+ fevers or chills  EYES/ENT: No visual changes;  No vertigo or throat pain   NECK: No pain or stiffness  RESPIRATORY: No cough, wheezing, hemoptysis; No shortness of breath  CARDIOVASCULAR: No chest pain or palpitations  GASTROINTESTINAL: No abdominal or epigastric pain. No nausea, vomiting, or hematemesis; +diarrhea alternating with constipation. No melena or hematochezia. +rectal pain   GENITOURINARY: No dysuria, frequency or hematuria  NEUROLOGICAL: No numbness or weakness  All other review of systems is negative unless indicated above.    Height (cm): 165.1 ( @ 01:04)  Weight (kg): 55.5 ( 01:04)  BMI (kg/m2): 20.4 ( 01:04)  BSA (m2): 1.61 ( 01:04)    T(F): 99.5 (25 @ 08:10), Max: 99.6 (25 @ 20:04)  HR: 74 (25 @ 08:10)  BP: 121/63 (25 @ 08:10)  RR: 18 (25 @ 08:10)  SpO2: 96% (25 @ 08:10)  Wt(kg): --    GENERAL: NAD, well-developed  HEAD:  Atraumatic, Normocephalic  EYES: EOMI,   CHEST/LUNG: Clear to auscultation bilaterally;   HEART: Regular rate and rhythm;   ABDOMEN: Soft, Nontender,   NEUROLOGY: non-focal                          8.9    1.46  )-----------( 57       ( 2025 06:48 )             26.4           134[L]  |  105  |  4[L]  ----------------------------<  100[H]  3.5   |  24  |  0.45[L]    Ca    8.1[L]      2025 06:48    TPro  5.0[L]  /  Alb  2.5[L]  /  TBili  0.4  /  DBili  x   /  AST  27  /  ALT  30  /  AlkPhos  58            PT/INR - ( 2025 20:41 )   PT: 12.1 sec;   INR: 1.03 ratio         PTT - ( 2025 20:41 )  PTT:27.6 sec    Blood None   @ 18:56   No growth at 5 days  --  --      
Patient is a 64y old  Female who presents with a chief complaint of   HPI:    HPI:  63 y/o F w/ PMH of DM2, hypothyroidism, OA, anal cancer (s/p chemo / RT), COPD. Presenting with fevers that started this afternoon, max temperature 101. Pt had chemotherapy session yesterday. Pt denies coughing, headaches, nausea/vomiting, Pain also having 9/10 pain with bowel movements due to her rectal cancer. Pt had a similar presentation seen earlier this month. In the ED VSS,  wbc 1.74, hb 9.8, plt 72, na 133, K 3.3 Pt admitted for further work up.  (2025 02:08)  Above HPI reviewed and reconciled with patient    64F with DM2, hypothyroidism, OA, anal cancer (s/p chemo / RT), COPD presents  with fever Tmax 101 after chemo the day prior  Overall, denies any cough, abdominal pain, n/v, dysuria.   No known recent sick contact  Afebrile on admission, on RA  WBC 1.46  Cr 0.54  UA negative  CTAP negative  CXR clear  RVP negative    PAST MEDICAL & SURGICAL HISTORY:  Osteoarthritis  s/p R THR       Hypothyroidism      DM2 (diabetes mellitus, type 2)      White coat syndrome without diagnosis of hypertension      History of total hip replacement, right  , [facility]        FAMILY HISTORY:  FH: pancreatic cancer (Father)    FH: COPD (chronic obstructive pulmonary disease) (Mother)      Social Hx: Denies alcohol, tobacco, recreational drug use    Allergies  No Known Allergies    ANTIMICROBIALS (past 90 days)  MEDICATIONS  (STANDING):    cefepime  Injectable.   2000 milliGRAM(s) IV Push (25 @ 21:45)        ACTIVE ANTIMICROBIALS  cefepime  Injectable. 2000 every 12 hours ( --- )    MEDICATIONS  (STANDING):  acetaminophen     Tablet .. 650 every 6 hours PRN  aluminum hydroxide/magnesium hydroxide/simethicone Suspension 30 every 4 hours PRN  dextrose 50% Injectable 25 once  dextrose 50% Injectable 12.5 once  dextrose 50% Injectable 25 once  dextrose Oral Gel 15 once PRN  fluticasone propionate/ salmeterol 100-50 MICROgram(s) Diskus 1 two times a day  glucagon  Injectable 1 once  HYDROmorphone  Injectable 1 every 4 hours PRN  levothyroxine 112 daily  loperamide 2 every 3 hours PRN  melatonin 3 at bedtime PRN  ondansetron Injectable 4 every 8 hours PRN  oxycodone    5 mG/acetaminophen 325 mG 1 every 4 hours PRN  tiotropium 2.5 MICROgram(s) Inhaler 2 daily      REVIEW OF SYSTEMS  [  ] ROS unobtainable because:    [ x ] All other systems negative except as noted below    Constitutional:  [ ] fever [ ] chills  [ ] weight loss  [ ]night sweat  [ ]poor appetite/PO intake [ ]fatigue   Skin:  [ ] rash [ ] phlebitis	  Eyes: [ ] icterus [ ] pain  [ ] discharge	  ENMT: [ ] sore throat  [ ] thrush [ ] ulcers [ ] exudates [ ]anosmia  Respiratory: [ ] dyspnea [ ] hemoptysis [ ] cough [ ] sputum	  Cardiovascular:  [ ] chest pain [ ] palpitations [ ] edema	  Gastrointestinal:  [ ] nausea [ ] vomiting [ ] diarrhea [ ] constipation [ ] pain	  Genitourinary:  [ ] dysuria [ ] frequency [ ] hematuria [ ] discharge [ ] flank pain  [ ] incontinence  Musculoskeletal:  [ ] myalgias [ ] arthralgias [ ] arthritis  [ ] back pain  Neurological:  [ ] headache [ ] weakness [ ] seizures  [ ] confusion/altered mental status    Vital Signs Last 24 Hrs  T(C): 37.2 (2025 01:25), Max: 37.6 (2025 20:04)  T(F): 98.9 (:25), Max: 99.6 (2025 20:04)  HR: 71 (:25) (63 - 80)  BP: 105/60 (:25) (96/64 - 142/69)  BP(mean): 75 (2025 00:15) (75 - 92)  RR: 17 (:25) (17 - 20)  SpO2: 97% (:25) (96% - 97%)    Parameters below as of 2025 01:25  Patient On (Oxygen Delivery Method): room air        Physical Exam:  Constitutional: frail, NAD  Head/Eyes: no icterus  LUNGS:  CTA  CVS:  regular rhythm  Abd:  soft, non-tender; non-distended  Ext:  no edema  Vascular:  IV site no erythema tenderness or discharge  Neuro: AAO X 3, non- focal    Labs: all available labs reviewed                        8.9    1.46  )-----------( 57       ( 2025 06:48 )             26.4     -    133[L]  |  101  |  6[L]  ----------------------------<  108[H]  3.3[L]   |  25  |  0.54    Ca    8.5      2025 20:41    TPro  6.0  /  Alb  2.9[L]  /  TBili  0.6  /  DBili  x   /  AST  38[H]  /  ALT  35  /  AlkPhos  64  04-25     LIVER FUNCTIONS - ( 2025 20:41 )  Alb: 2.9 g/dL / Pro: 6.0 gm/dL / ALK PHOS: 64 U/L / ALT: 35 U/L / AST: 38 U/L / GGT: x           Urinalysis Basic - ( 2025 21:23 )    Color: Yellow / Appearance: Clear / S.006 / pH: x  Gluc: x / Ketone: Negative mg/dL  / Bili: Negative / Urobili: 1.0 mg/dL   Blood: x / Protein: Negative mg/dL / Nitrite: Negative   Leuk Esterase: Trace / RBC: 15 /HPF / WBC 2 /HPF   Sq Epi: x / Non Sq Epi: 0 /HPF / Bacteria: Negative /HPF          Radiology: all available radiological tests reviewed    Advanced directives addressed: full resuscitation
Constitutional: See HPI.  Eyes: No visual changes  ENMT:  No neck pain   Cardiac: No cp  Respiratory: No cough  GI: No nausea, vomiting, diarrhea or abdominal pain.  : No dysuria, frequency or burning  MS: No myalgia, muscle weakness, joint pain or back pain.  Psych: No suicidal or homicidal ideations.  Neuro: No headache  Skin: No skin rash.

## 2025-04-26 NOTE — DIETITIAN INITIAL EVALUATION ADULT - OTHER INFO
63 y/o F w/ PMH of DM2, hypothyroidism, OA, anal cancer (s/p chemo / RT), COPD. Presenting with fevers that started this afternoon, max temperature 101. Pt had chemotherapy session yesterday. Pain also having 9/10 pain with bowel movements due to her rectal cancer. Admitted for neutropenic fever, pancytopenia 2/2 chemo tx.     Pleasant pt, sitting in chair. Reports nausea. Ordered small breakfast. Appears thin. Full NFPE able to be conducted d/t vital signs being taken. Moderate wasting of face/ upper body noted. # reported by pt. Unable to obtain bed scale wt. Admit wt 122#. Meets criteria for PCM. On cons CHO diet, POCT and A1C WNL. Suggest to Liberalize diet to regular to maximize caloric and nutrient intake. Will trial [employer] BID. Confirm goals of care regarding nutrition support. Verbal/written education on high kcal/protein foods provided. See additional recs below.

## 2025-04-27 LAB
ANION GAP SERPL CALC-SCNC: 7 MMOL/L — SIGNIFICANT CHANGE UP (ref 5–17)
BASOPHILS # BLD AUTO: 0.01 K/UL — SIGNIFICANT CHANGE UP (ref 0–0.2)
BASOPHILS NFR BLD AUTO: 0.7 % — SIGNIFICANT CHANGE UP (ref 0–2)
BUN SERPL-MCNC: 6 MG/DL — LOW (ref 7–23)
CALCIUM SERPL-MCNC: 8.2 MG/DL — LOW (ref 8.5–10.1)
CHLORIDE SERPL-SCNC: 105 MMOL/L — SIGNIFICANT CHANGE UP (ref 96–108)
CO2 SERPL-SCNC: 22 MMOL/L — SIGNIFICANT CHANGE UP (ref 22–31)
CREAT SERPL-MCNC: 0.52 MG/DL — SIGNIFICANT CHANGE UP (ref 0.5–1.3)
EGFR: 103 ML/MIN/1.73M2 — SIGNIFICANT CHANGE UP
EGFR: 103 ML/MIN/1.73M2 — SIGNIFICANT CHANGE UP
EOSINOPHIL # BLD AUTO: 0.01 K/UL — SIGNIFICANT CHANGE UP (ref 0–0.5)
EOSINOPHIL NFR BLD AUTO: 0.7 % — SIGNIFICANT CHANGE UP (ref 0–6)
GLUCOSE BLDC GLUCOMTR-MCNC: 85 MG/DL — SIGNIFICANT CHANGE UP (ref 70–99)
GLUCOSE SERPL-MCNC: 92 MG/DL — SIGNIFICANT CHANGE UP (ref 70–99)
HCT VFR BLD CALC: 26.2 % — LOW (ref 34.5–45)
HGB BLD-MCNC: 8.6 G/DL — LOW (ref 11.5–15.5)
IMM GRANULOCYTES # BLD AUTO: 0.06 K/UL — SIGNIFICANT CHANGE UP (ref 0–0.07)
IMM GRANULOCYTES NFR BLD AUTO: 4.2 % — HIGH (ref 0–0.9)
IMMATURE PLATELET FRACTION #: 0.9 K/UL — LOW (ref 4.7–11.1)
IMMATURE PLATELET FRACTION %: 1.8 % — SIGNIFICANT CHANGE UP (ref 1.6–4.9)
LYMPHOCYTES # BLD AUTO: 0.18 K/UL — LOW (ref 1–3.3)
LYMPHOCYTES NFR BLD AUTO: 12.5 % — LOW (ref 13–44)
MAGNESIUM SERPL-MCNC: 1.8 MG/DL — SIGNIFICANT CHANGE UP (ref 1.6–2.6)
MCHC RBC-ENTMCNC: 28.3 PG — SIGNIFICANT CHANGE UP (ref 27–34)
MCHC RBC-ENTMCNC: 32.8 G/DL — SIGNIFICANT CHANGE UP (ref 32–36)
MCV RBC AUTO: 86.2 FL — SIGNIFICANT CHANGE UP (ref 80–100)
MONOCYTES # BLD AUTO: 0.11 K/UL — SIGNIFICANT CHANGE UP (ref 0–0.9)
MONOCYTES NFR BLD AUTO: 7.6 % — SIGNIFICANT CHANGE UP (ref 2–14)
NEUTROPHILS # BLD AUTO: 1.07 K/UL — LOW (ref 1.8–7.4)
NEUTROPHILS NFR BLD AUTO: 74.3 % — SIGNIFICANT CHANGE UP (ref 43–77)
NRBC # BLD AUTO: 0 K/UL — SIGNIFICANT CHANGE UP (ref 0–0)
NRBC # FLD: 0 K/UL — SIGNIFICANT CHANGE UP (ref 0–0)
NRBC BLD AUTO-RTO: 0 /100 WBCS — SIGNIFICANT CHANGE UP (ref 0–0)
PHOSPHATE SERPL-MCNC: 2.7 MG/DL — SIGNIFICANT CHANGE UP (ref 2.5–4.5)
PLATELET # BLD AUTO: 49 K/UL — LOW (ref 150–400)
PMV BLD: 9.7 FL — SIGNIFICANT CHANGE UP (ref 7–13)
POTASSIUM SERPL-MCNC: 3.4 MMOL/L — LOW (ref 3.5–5.3)
POTASSIUM SERPL-SCNC: 3.4 MMOL/L — LOW (ref 3.5–5.3)
RBC # BLD: 3.04 M/UL — LOW (ref 3.8–5.2)
RBC # FLD: 18.2 % — HIGH (ref 10.3–14.5)
SODIUM SERPL-SCNC: 134 MMOL/L — LOW (ref 135–145)
WBC # BLD: 1.44 K/UL — LOW (ref 3.8–10.5)
WBC # FLD AUTO: 1.44 K/UL — LOW (ref 3.8–10.5)

## 2025-04-27 PROCEDURE — 99233 SBSQ HOSP IP/OBS HIGH 50: CPT

## 2025-04-27 RX ORDER — SENNA 187 MG
2 TABLET ORAL AT BEDTIME
Refills: 0 | Status: DISCONTINUED | OUTPATIENT
Start: 2025-04-27 | End: 2025-04-29

## 2025-04-27 RX ORDER — OXYCODONE HYDROCHLORIDE 30 MG/1
10 TABLET ORAL EVERY 4 HOURS
Refills: 0 | Status: DISCONTINUED | OUTPATIENT
Start: 2025-04-27 | End: 2025-04-28

## 2025-04-27 RX ORDER — MAGNESIUM SULFATE 500 MG/ML
1 SYRINGE (ML) INJECTION ONCE
Refills: 0 | Status: COMPLETED | OUTPATIENT
Start: 2025-04-27 | End: 2025-04-27

## 2025-04-27 RX ORDER — HYDROMORPHONE/SOD CHLOR,ISO/PF 2 MG/10 ML
1 SYRINGE (ML) INJECTION EVERY 8 HOURS
Refills: 0 | Status: DISCONTINUED | OUTPATIENT
Start: 2025-04-27 | End: 2025-04-29

## 2025-04-27 RX ORDER — OXYCODONE HYDROCHLORIDE 30 MG/1
5 TABLET ORAL EVERY 4 HOURS
Refills: 0 | Status: DISCONTINUED | OUTPATIENT
Start: 2025-04-27 | End: 2025-04-28

## 2025-04-27 RX ADMIN — Medication 2 MILLIGRAM(S): at 11:46

## 2025-04-27 RX ADMIN — LIDOCAINE HYDROCHLORIDE 1 APPLICATION(S): 20 JELLY TOPICAL at 10:55

## 2025-04-27 RX ADMIN — Medication 30 MILLILITER(S): at 13:06

## 2025-04-27 RX ADMIN — Medication 25 GRAM(S): at 17:23

## 2025-04-27 RX ADMIN — Medication 1 MILLIGRAM(S): at 03:13

## 2025-04-27 RX ADMIN — Medication 40 MILLIEQUIVALENT(S): at 15:26

## 2025-04-27 RX ADMIN — Medication 1 MILLIGRAM(S): at 08:06

## 2025-04-27 RX ADMIN — Medication 650 MILLIGRAM(S): at 15:26

## 2025-04-27 RX ADMIN — Medication 1 MILLIGRAM(S): at 03:43

## 2025-04-27 RX ADMIN — Medication 75 MILLILITER(S): at 17:23

## 2025-04-27 RX ADMIN — HEPARIN SODIUM 5000 UNIT(S): 1000 INJECTION INTRAVENOUS; SUBCUTANEOUS at 15:26

## 2025-04-27 RX ADMIN — Medication 1 APPLICATION(S): at 21:01

## 2025-04-27 RX ADMIN — Medication 75 MILLILITER(S): at 05:24

## 2025-04-27 RX ADMIN — Medication 100 GRAM(S): at 12:02

## 2025-04-27 RX ADMIN — OXYCODONE HYDROCHLORIDE 10 MILLIGRAM(S): 30 TABLET ORAL at 17:39

## 2025-04-27 RX ADMIN — Medication 1 MILLIGRAM(S): at 08:37

## 2025-04-27 RX ADMIN — OXYCODONE HYDROCHLORIDE 10 MILLIGRAM(S): 30 TABLET ORAL at 21:00

## 2025-04-27 RX ADMIN — Medication 40 MILLIEQUIVALENT(S): at 10:56

## 2025-04-27 RX ADMIN — Medication 2 MILLIGRAM(S): at 12:31

## 2025-04-27 RX ADMIN — Medication 112 MICROGRAM(S): at 05:25

## 2025-04-27 RX ADMIN — Medication 25 GRAM(S): at 08:07

## 2025-04-27 RX ADMIN — Medication 25 GRAM(S): at 01:26

## 2025-04-27 RX ADMIN — HEPARIN SODIUM 5000 UNIT(S): 1000 INJECTION INTRAVENOUS; SUBCUTANEOUS at 05:25

## 2025-04-27 RX ADMIN — HEPARIN SODIUM 5000 UNIT(S): 1000 INJECTION INTRAVENOUS; SUBCUTANEOUS at 21:00

## 2025-04-27 NOTE — PROGRESS NOTE ADULT - ASSESSMENT
65 y/o F w/ PMH of anal cancer s/p mitomycin, xeloda / RT completed 4/24/25, COPD presenting with fevers that started this afternoon, max temperature 101.     # anal cancer  - follows at [facility]   - s/p completion of mitomycin, xeloda and RT wtih LD on Thursdasy 4/24/25   - anal irritation w/constipation alternating w/diarrhea - added topical lidocaine, silver sulfadiazine and aquafor for soreness  - dilaudid and percocet prn for pain   - docusate for stool softener     # neutropenic fever  - In the ED VSS,  wbc 1.74, hb 9.8, plt 72, na 133, K 3.3 and repleted  - viral panel negative   - UA negative   - CXR negative  - CT a/p without new acute finding   - pt on zosyn  - wbc 1.44, anc 1000, Hb 8.6, plt 49 - pt w/ increasing anc 1.07  - will plan to give neupogen if anc <500 and spikes another fever as it has been >24 hrs since last chemo dose   - send cbc with differential daily - counts to improve now off chemo     venodynes    will follow daily

## 2025-04-27 NOTE — PROGRESS NOTE ADULT - ASSESSMENT
63 y/o F w/ PMH of DM2, hypothyroidism, OA, anal cancer (s/p chemo / RT), COPD admitted for:       # Neutropenic fever, Met SIRS criteria on admission.   fever 102 at home, leukopenia   CXR neg   UA neg   F/u UCX  neutropenic  precautions  CT A/P: no acute findings has  lower rectum and anus appear thickened and there is hyperemia of the mucosa.  D/w Dr Lopez will change Cefepime to Zosyn for better anaerobic coverage       # Pancytopenia 2/2 chemo  Rectal CA s/p chemo / RT  s/p completion of mitomycin, xeloda and RT wtih LD on Thursdasy 4/24/25   Likely radiation proctitis and dermatitis  Start silvadene , Lidocain and Witch hazel patches PRN   Change pain meds to PO Dulaudid 2mg Q4h and C/w IV Dilaudid Q4h   Tylenol PRN   D/w Dr Riojas, [facility] reviewed, has mild neutropenia, hold off on neurogen, labs daily        # Borderline Hypotension Multisectional:  dehydration, poor oral intake, infection and Pain meds  Trial of oral Dilaudid  Abxs adjusted  IVF bolus and Gnetle Hydration ordered  Monitor closely     # Hyponatremia  #hypokalemia  - replete and monitor     # Hypothyroidism  - C/w Levothyroxine     # COPD  - Stable, on trelegy at home  - Start advair and spiriva  here       # DM2  - A1c 5.6  - Observe off insulin     # HTN  - Continue losartan    # DVT prophylaxis  - SCDS, add heparin SQ as Ptis high risk      65 y/o F w/ PMH of DM2, hypothyroidism, OA, anal cancer (s/p chemo / RT), COPD admitted for:     # Neutropenic fever, Met SIRS criteria on admission.   fever 102 at home, leukopenia   CXR neg   UA neg   F/u BCX: NGTD  No GI PCR sent as P has only  mucus as per RN   neutropenic  precautions  CT A/P: no acute findings has  lower rectum and anus appear thickened and there is hyperemia of the mucosa.  Cefepime changed to Zosyn IV, fevers  better, monitor   D/w Dr Lopez    # Pancytopenia 2/2 chemo  Rectal CA s/p chemo / RT  s/p completion of mitomycin, xeloda and RT wtih LD on Thursdasy 4/24/25   Likely radiation proctitis and dermatitis  C/w silvadene  and Lidocain, Witch hazel patches PRN   Change pain meds to PO Dilaudid 2mg Q4h and C/w IV Dilaudid Q4h   Tylenol PRN   heme onc recs appreciated, ANC little better today, trend CBC  with Diff daily Dy       # Borderline Hypotension Multisectional:  dehydration, poor oral intake, infection and Pain meds  BP improved   C/w PO dilaudid  S/p IVF bolus and Gentle Hydration   Hold JENNIFER meds for now   Monitor closely     # Hyponatremia, improved   #hypokalemia/ Low magnesium   - replete KCl and mag   - repeat labs in am      # Hypothyroidism  - C/w Levothyroxine     # COPD  - Stable, on trelegy at home  - Start advair and spiriva  here       # DM2  - A1c 5.6  - Observe off insulin     # HTN  - Continue losartan    # DVT prophylaxis  - SCDS, add heparin SQ as Ptis high risk

## 2025-04-27 NOTE — PROGRESS NOTE ADULT - SUBJECTIVE AND OBJECTIVE BOX
CC: Neutropenia      HPI: 63 y/o F w/ PMH of DM2, hypothyroidism, OA, anal cancer (s/p chemo / RT), COPD. Presenting with fevers that started this afternoon, max temperature 101. Pt had chemotherapy session yesterday. Pt denies coughing, headaches, nausea/vomiting, Pain also having 9/10 pain with bowel movements due to her rectal cancer. Pt had a similar presentation seen earlier this month. In the ED VSS,  wbc 1.74, hb 9.8, plt 72, na 133, K 3.3 Pt admitted for further work up.      INTERVAL HPI/OVERNIGHT EVENTS:  Pt was seen and examined,        Vital Signs Last 24 Hrs  T(C): 37.7 (27 Apr 2025 07:48), Max: 39.2 (26 Apr 2025 19:51)  T(F): 99.8 (27 Apr 2025 07:48), Max: 102.6 (26 Apr 2025 19:51)  HR: 77 (27 Apr 2025 07:48) (63 - 79)  BP: 121/67 (27 Apr 2025 07:48) (89/56 - 121/67)  RR: 19 (27 Apr 2025 07:48) (17 - 19)  SpO2: 93% (27 Apr 2025 07:48) (93% - 100%)    Parameters below as of 27 Apr 2025 07:48  Patient On (Oxygen Delivery Method): room air        REVIEW OF SYSTEMS:  All other review of systems is negative unless indicated above.      PHYSICAL EXAM:  General: uncomfortable,  in no respiratory  distress  Eyes: EOMI; conjunctiva and sclera clear  Head: Normocephalic; atraumatic  ENMT: No nasal discharge; airway clear  Respiratory: No wheezes, rales or rhonchi  Cardiovascular: Regular rate and rhythm. S1 and S2 Normal;   Gastrointestinal: Soft non-tender non-distended; Normal bowel sounds  Genitourinary: No  suprapubic  tenderness  Extremities: No edema  Neurological: Alert and oriented x 3, non focal   Musculoskeletal: Normal muscle tone, without deformities  Psychiatric: Cooperative      LABS:                           8.9    1.46  )-----------( 57       ( 26 Apr 2025 06:48 )             26.4     26 Apr 2025 06:48    134    |  105    |  4      ----------------------------<  100    3.5     |  24     |  0.45     Ca    8.1        26 Apr 2025 06:48    TPro  5.0    /  Alb  2.5    /  TBili  0.4    /  DBili  x      /  AST  27     /  ALT  30     /  AlkPhos  58     26 Apr 2025 06:48    PT/INR - ( 25 Apr 2025 20:41 )   PT: 12.1 sec;   INR: 1.03 ratio         PTT - ( 25 Apr 2025 20:41 )  PTT:27.6 sec  CAPILLARY BLOOD GLUCOSE        LIVER FUNCTIONS - ( 26 Apr 2025 06:48 )  Alb: 2.5 g/dL / Pro: 5.0 gm/dL / ALK PHOS: 58 U/L / ALT: 30 U/L / AST: 27 U/L / GGT: x           Urinalysis Basic - ( 26 Apr 2025 06:48 )    Color: x / Appearance: x / SG: x / pH: x  Gluc: 100 mg/dL / Ketone: x  / Bili: x / Urobili: x   Blood: x / Protein: x / Nitrite: x   Leuk Esterase: x / RBC: x / WBC x   Sq Epi: x / Non Sq Epi: x / Bacteria: x        MEDICATIONS  (STANDING):  dextrose 5%. 1000 milliLiter(s) (100 mL/Hr) IV Continuous <Continuous>  dextrose 5%. 1000 milliLiter(s) (50 mL/Hr) IV Continuous <Continuous>  dextrose 50% Injectable 25 Gram(s) IV Push once  dextrose 50% Injectable 12.5 Gram(s) IV Push once  dextrose 50% Injectable 25 Gram(s) IV Push once  fluticasone propionate/ salmeterol 100-50 MICROgram(s) Diskus 1 Dose(s) Inhalation two times a day  glucagon  Injectable 1 milliGRAM(s) IntraMuscular once  lactated ringers. 1000 milliLiter(s) (75 mL/Hr) IV Continuous <Continuous>  levothyroxine 112 MICROGram(s) Oral daily  piperacillin/tazobactam IVPB. 3.375 Gram(s) IV Intermittent once  piperacillin/tazobactam IVPB.- 3.375 Gram(s) IV Intermittent once  silver sulfADIAZINE 1% Cream 1 Application(s) Topical two times a day  tiotropium 2.5 MICROgram(s) Inhaler 2 Puff(s) Inhalation daily    MEDICATIONS  (PRN):  acetaminophen     Tablet .. 650 milliGRAM(s) Oral every 6 hours PRN Temp greater or equal to 38C (100.4F), Mild Pain (1 - 3)  aluminum hydroxide/magnesium hydroxide/simethicone Suspension 30 milliLiter(s) Oral every 4 hours PRN Dyspepsia  dextrose Oral Gel 15 Gram(s) Oral once PRN Blood Glucose LESS THAN 70 milliGRAM(s)/deciliter  HYDROmorphone   Tablet 2 milliGRAM(s) Oral every 4 hours PRN Moderate Pain (4 - 6)  HYDROmorphone  Injectable 1 milliGRAM(s) IV Push every 4 hours PRN Severe Pain (7 - 10)  lidocaine 5% Ointment 1 Application(s) Topical every 6 hours PRN pain  loperamide 2 milliGRAM(s) Oral every 3 hours PRN Diarrhea  melatonin 3 milliGRAM(s) Oral at bedtime PRN Insomnia  ondansetron Injectable 4 milliGRAM(s) IV Push every 8 hours PRN Nausea and/or Vomiting  witch hazel Pads 1 Application(s) Topical four times a day PRN pain      RADIOLOGY & ADDITIONAL TESTS:      ACC: [id number] EXAM:  CT ABDOMEN AND PELVIS IC   ORDERED BY: JOSÉ MIGUEL IRAHETA     PROCEDURE DATE:  04/25/2025          INTERPRETATION:  CLINICAL INFORMATION: Lower abdominal pain. History of   anal cancer on chemotherapy.    COMPARISON: CT abdomen and pelvis 3/30/2025.    CONTRAST/COMPLICATIONS:  IV Contrast: Omnipaque 350  90 cc administered   0 cc discarded  Oral Contrast: NONE  .    PROCEDURE:  CT of the Abdomen and Pelvis was performed.  Sagittal and coronal reformats were performed.    FINDINGS:  LOWER CHEST: Within normal limits.    LIVER: Within normal limits.  BILE DUCTS: Normal caliber.  GALLBLADDER: Within normal limits.  SPLEEN: Within normal limits.  PANCREAS: Within normal limits.  ADRENALS: Within normal limits.  KIDNEYS/URETERS: Small left renal cyst.    BLADDER: Within normal limits.  REPRODUCTIVE ORGANS: Uterus and adnexa within normal limits.    BOWEL: Sigmoid diverticulosis and thickening of the sigmoid colon   possibly on the basis of chronic diverticular disease. No evidence for   acute diverticulitis. The lower rectum and anus appear thickened and   there is hyperemia of the mucosa. This may be related to post radiation   changes. Similar to prior exam. Evaluation limited secondary to streak   artifact from right hip prosthesis. No bowel obstruction. Appendix is   normal.  PERITONEUM/RETROPERITONEUM: Within normal limits.  VESSELS: Atherosclerotic changes.  LYMPH NODES: No lymphadenopathy.  ABDOMINAL WALL: Within normal limits.  BONES: Degenerative changes. Right hip replacement.    IMPRESSION:    Stable examination when compared to the prior exam of 3/30/2025. No new   acute finding.         CC: Neutropenia      HPI: 63 y/o F w/ PMH of DM2, hypothyroidism, OA, anal cancer (s/p chemo / RT), COPD. Presenting with fevers that started this afternoon, max temperature 101. Pt had chemotherapy session yesterday. Pt denies coughing, headaches, nausea/vomiting, Pain also having 9/10 pain with bowel movements due to her rectal cancer. Pt had a similar presentation seen earlier this month. In the ED VSS,  wbc 1.74, hb 9.8, plt 72, na 133, K 3.3 Pt admitted for further work up.      INTERVAL HPI/ OVERNIGHT EVENTS:  Pt was seen and examined, reports pain is better with meds, but still there as soon as ot wears off. No BMs, just small mucus. fevers better, but still has chills.  Tries to eat but stool  poor intake. Plan discussed     Vital Signs Last 24 Hrs  T(C): 37.7 (27 Apr 2025 07:48), Max: 39.2 (26 Apr 2025 19:51)  T(F): 99.8 (27 Apr 2025 07:48), Max: 102.6 (26 Apr 2025 19:51)  HR: 77 (27 Apr 2025 07:48) (63 - 79)  BP: 121/67 (27 Apr 2025 07:48) (89/56 - 121/67)  RR: 19 (27 Apr 2025 07:48) (17 - 19)  SpO2: 93% (27 Apr 2025 07:48) (93% - 100%)    Parameters below as of 27 Apr 2025 07:48  Patient On (Oxygen Delivery Method): room air      REVIEW OF SYSTEMS:  All other review of systems is negative unless indicated above.      PHYSICAL EXAM:  General: uncomfortable,  in no respiratory  distress  Eyes: EOMI; conjunctiva and sclera clear  Head: Normocephalic; atraumatic  ENMT: No nasal discharge; airway clear  Respiratory: No wheezes, rales or rhonchi  Cardiovascular: Regular rate and rhythm. S1 and S2 Normal;   Gastrointestinal: Soft non-tender non-distended; Normal bowel sounds  Genitourinary: No  suprapubic  tenderness  Extremities: No edema  Neurological: Alert and oriented x 3, non focal   Musculoskeletal: Normal muscle tone, without deformities  Psychiatric: Cooperative      LABS:                           8.6    1.44  )-----------( 49       ( 27 Apr 2025 08:12 )             26.2     04-27    134[L]  |  105  |  6[L]  ----------------------------<  92  3.4[L]   |  22  |  0.52    Ca    8.2[L]      27 Apr 2025 08:12  Phos  2.7     04-27  Mg     1.8     04-27    TPro  5.0[L]  /  Alb  2.5[L]  /  TBili  0.4  /  DBili  x   /  AST  27  /  ALT  30  /  AlkPhos  58  04-26  LIVER FUNCTIONS - ( 26 Apr 2025 06:48 )  Alb: 2.5 g/dL / Pro: 5.0 gm/dL / ALK PHOS: 58 U/L / ALT: 30 U/L / AST: 27 U/L / GGT: x         PT/INR - ( 25 Apr 2025 20:41 )   PT: 12.1 sec;   INR: 1.03 ratio    PTT - ( 25 Apr 2025 20:41 )  PTT:27.6 sec      Urinalysis Basic - ( 27 Apr 2025 08:12 )  Color: x / Appearance: x / SG: x / pH: x  Gluc: 92 mg/dL / Ketone: x  / Bili: x / Urobili: x   Blood: x / Protein: x / Nitrite: x   Leuk Esterase: x / RBC: x / WBC x   Sq Epi: x / Non Sq Epi: x / Bacteria: x                            8.9    1.46  )-----------( 57       ( 26 Apr 2025 06:48 )             26.4     26 Apr 2025 06:48    134    |  105    |  4      ----------------------------<  100    3.5     |  24     |  0.45     Ca    8.1        26 Apr 2025 06:48    TPro  5.0    /  Alb  2.5    /  TBili  0.4    /  DBili  x      /  AST  27     /  ALT  30     /  AlkPhos  58     26 Apr 2025 06:48  PT/INR - ( 25 Apr 2025 20:41 )   PT: 12.1 sec;   INR: 1.03 ratio    PTT - ( 25 Apr 2025 20:41 )  PTT:27.6 sec      LIVER FUNCTIONS - ( 26 Apr 2025 06:48 )  Alb: 2.5 g/dL / Pro: 5.0 gm/dL / ALK PHOS: 58 U/L / ALT: 30 U/L / AST: 27 U/L / GGT: x           Urinalysis Basic - ( 26 Apr 2025 06:48 )    Color: x / Appearance: x / SG: x / pH: x  Gluc: 100 mg/dL / Ketone: x  / Bili: x / Urobili: x   Blood: x / Protein: x / Nitrite: x   Leuk Esterase: x / RBC: x / WBC x   Sq Epi: x / Non Sq Epi: x / Bacteria: x        MEDICATIONS  (STANDING):  dextrose 5%. 1000 milliLiter(s) (100 mL/Hr) IV Continuous <Continuous>  dextrose 5%. 1000 milliLiter(s) (50 mL/Hr) IV Continuous <Continuous>  dextrose 50% Injectable 25 Gram(s) IV Push once  dextrose 50% Injectable 12.5 Gram(s) IV Push once  dextrose 50% Injectable 25 Gram(s) IV Push once  fluticasone propionate/ salmeterol 100-50 MICROgram(s) Diskus 1 Dose(s) Inhalation two times a day  glucagon  Injectable 1 milliGRAM(s) IntraMuscular once  lactated ringers. 1000 milliLiter(s) (75 mL/Hr) IV Continuous <Continuous>  levothyroxine 112 MICROGram(s) Oral daily  piperacillin/tazobactam IVPB. 3.375 Gram(s) IV Intermittent once  piperacillin/tazobactam IVPB.- 3.375 Gram(s) IV Intermittent once  silver sulfADIAZINE 1% Cream 1 Application(s) Topical two times a day  tiotropium 2.5 MICROgram(s) Inhaler 2 Puff(s) Inhalation daily    MEDICATIONS  (PRN):  acetaminophen     Tablet .. 650 milliGRAM(s) Oral every 6 hours PRN Temp greater or equal to 38C (100.4F), Mild Pain (1 - 3)  aluminum hydroxide/magnesium hydroxide/simethicone Suspension 30 milliLiter(s) Oral every 4 hours PRN Dyspepsia  dextrose Oral Gel 15 Gram(s) Oral once PRN Blood Glucose LESS THAN 70 milliGRAM(s)/deciliter  HYDROmorphone   Tablet 2 milliGRAM(s) Oral every 4 hours PRN Moderate Pain (4 - 6)  HYDROmorphone  Injectable 1 milliGRAM(s) IV Push every 4 hours PRN Severe Pain (7 - 10)  lidocaine 5% Ointment 1 Application(s) Topical every 6 hours PRN pain  loperamide 2 milliGRAM(s) Oral every 3 hours PRN Diarrhea  melatonin 3 milliGRAM(s) Oral at bedtime PRN Insomnia  ondansetron Injectable 4 milliGRAM(s) IV Push every 8 hours PRN Nausea and/or Vomiting  witch hazel Pads 1 Application(s) Topical four times a day PRN pain      RADIOLOGY & ADDITIONAL TESTS:      ACC: [id number] EXAM:  CT ABDOMEN AND PELVIS IC   ORDERED BY: JOSÉ MIGUEL IRAHETA     PROCEDURE DATE:  04/25/2025          INTERPRETATION:  CLINICAL INFORMATION: Lower abdominal pain. History of   anal cancer on chemotherapy.    COMPARISON: CT abdomen and pelvis 3/30/2025.    CONTRAST/COMPLICATIONS:  IV Contrast: Omnipaque 350  90 cc administered   0 cc discarded  Oral Contrast: NONE  .    PROCEDURE:  CT of the Abdomen and Pelvis was performed.  Sagittal and coronal reformats were performed.    FINDINGS:  LOWER CHEST: Within normal limits.    LIVER: Within normal limits.  BILE DUCTS: Normal caliber.  GALLBLADDER: Within normal limits.  SPLEEN: Within normal limits.  PANCREAS: Within normal limits.  ADRENALS: Within normal limits.  KIDNEYS/URETERS: Small left renal cyst.    BLADDER: Within normal limits.  REPRODUCTIVE ORGANS: Uterus and adnexa within normal limits.    BOWEL: Sigmoid diverticulosis and thickening of the sigmoid colon   possibly on the basis of chronic diverticular disease. No evidence for   acute diverticulitis. The lower rectum and anus appear thickened and   there is hyperemia of the mucosa. This may be related to post radiation   changes. Similar to prior exam. Evaluation limited secondary to streak   artifact from right hip prosthesis. No bowel obstruction. Appendix is   normal.  PERITONEUM/RETROPERITONEUM: Within normal limits.  VESSELS: Atherosclerotic changes.  LYMPH NODES: No lymphadenopathy.  ABDOMINAL WALL: Within normal limits.  BONES: Degenerative changes. Right hip replacement.    IMPRESSION:    Stable examination when compared to the prior exam of 3/30/2025. No new   acute finding.

## 2025-04-27 NOTE — PROGRESS NOTE ADULT - SUBJECTIVE AND OBJECTIVE BOX
INTERVAL HPI/OVERNIGHT EVENTS:  Patient S&E at bedside.   pt given ivf and tylenol overnight   remains on zosyn  tmax 102.6 at 7 pm last night and has been afebrile since  reports constipation   concerned about rectal pain with bm   wbc 1.44, anc 1000, Hb 8.6, plt 49     PAST MEDICAL & SURGICAL HISTORY:  Osteoarthritis  s/p R THR 2011      Hypothyroidism      DM2 (diabetes mellitus, type 2)      White coat syndrome without diagnosis of hypertension      History of total hip replacement, right  2011, [facility]          FAMILY HISTORY:  FH: pancreatic cancer (Father)    FH: COPD (chronic obstructive pulmonary disease) (Mother)        VITAL SIGNS:  T(F): 99.8 (04-27-25 @ 07:48)  HR: 77 (04-27-25 @ 07:48)  BP: 121/67 (04-27-25 @ 07:48)  RR: 19 (04-27-25 @ 07:48)  SpO2: 93% (04-27-25 @ 07:48)  Wt(kg): --    PHYSICAL EXAM:    GENERAL: NAD, well-developed  HEAD:  Atraumatic, Normocephalic  EYES: EOMI,   CHEST/LUNG: Clear to auscultation bilaterally;   HEART: Regular rate and rhythm;   ABDOMEN: Soft, Nontender,   NEUROLOGY: non-focal      MEDICATIONS  (STANDING):  dextrose 5%. 1000 milliLiter(s) (100 mL/Hr) IV Continuous <Continuous>  dextrose 5%. 1000 milliLiter(s) (50 mL/Hr) IV Continuous <Continuous>  dextrose 50% Injectable 25 Gram(s) IV Push once  dextrose 50% Injectable 12.5 Gram(s) IV Push once  dextrose 50% Injectable 25 Gram(s) IV Push once  fluticasone propionate/ salmeterol 100-50 MICROgram(s) Diskus 1 Dose(s) Inhalation two times a day  glucagon  Injectable 1 milliGRAM(s) IntraMuscular once  heparin   Injectable 5000 Unit(s) SubCutaneous every 8 hours  levothyroxine 112 MICROGram(s) Oral daily  piperacillin/tazobactam IVPB.. 3.375 Gram(s) IV Intermittent every 8 hours  silver sulfADIAZINE 1% Cream 1 Application(s) Topical two times a day  sodium chloride 0.9%. 1000 milliLiter(s) (75 mL/Hr) IV Continuous <Continuous>  tiotropium 2.5 MICROgram(s) Inhaler 2 Puff(s) Inhalation daily    MEDICATIONS  (PRN):  acetaminophen     Tablet .. 650 milliGRAM(s) Oral every 6 hours PRN Temp greater or equal to 38C (100.4F), Mild Pain (1 - 3)  aluminum hydroxide/magnesium hydroxide/simethicone Suspension 30 milliLiter(s) Oral every 4 hours PRN Dyspepsia  dextrose Oral Gel 15 Gram(s) Oral once PRN Blood Glucose LESS THAN 70 milliGRAM(s)/deciliter  HYDROmorphone   Tablet 2 milliGRAM(s) Oral every 4 hours PRN Moderate Pain (4 - 6)  HYDROmorphone  Injectable 1 milliGRAM(s) IV Push every 4 hours PRN Severe Pain (7 - 10)  lidocaine 5% Ointment 1 Application(s) Topical every 6 hours PRN pain  loperamide 2 milliGRAM(s) Oral every 3 hours PRN Diarrhea  melatonin 3 milliGRAM(s) Oral at bedtime PRN Insomnia  ondansetron Injectable 4 milliGRAM(s) IV Push every 8 hours PRN Nausea and/or Vomiting  witch hazel Pads 1 Application(s) Topical four times a day PRN pain      Allergies    No Known Allergies    Intolerances        LABS:                        8.6    1.44  )-----------( 49       ( 27 Apr 2025 08:12 )             26.2     04-26    134[L]  |  105  |  4[L]  ----------------------------<  100[H]  3.5   |  24  |  0.45[L]    Ca    8.1[L]      26 Apr 2025 06:48    TPro  5.0[L]  /  Alb  2.5[L]  /  TBili  0.4  /  DBili  x   /  AST  27  /  ALT  30  /  AlkPhos  58  04-26    PT/INR - ( 25 Apr 2025 20:41 )   PT: 12.1 sec;   INR: 1.03 ratio         PTT - ( 25 Apr 2025 20:41 )  PTT:27.6 sec  Urinalysis Basic - ( 26 Apr 2025 06:48 )    Color: x / Appearance: x / SG: x / pH: x  Gluc: 100 mg/dL / Ketone: x  / Bili: x / Urobili: x   Blood: x / Protein: x / Nitrite: x   Leuk Esterase: x / RBC: x / WBC x   Sq Epi: x / Non Sq Epi: x / Bacteria: x        RADIOLOGY & ADDITIONAL TESTS:  Studies reviewed.

## 2025-04-28 LAB
ANION GAP SERPL CALC-SCNC: 5 MMOL/L — SIGNIFICANT CHANGE UP (ref 5–17)
BASOPHILS # BLD AUTO: 0.01 K/UL — SIGNIFICANT CHANGE UP (ref 0–0.2)
BASOPHILS NFR BLD AUTO: 0.6 % — SIGNIFICANT CHANGE UP (ref 0–2)
BUN SERPL-MCNC: 5 MG/DL — LOW (ref 7–23)
CALCIUM SERPL-MCNC: 8 MG/DL — LOW (ref 8.5–10.1)
CHLORIDE SERPL-SCNC: 105 MMOL/L — SIGNIFICANT CHANGE UP (ref 96–108)
CO2 SERPL-SCNC: 24 MMOL/L — SIGNIFICANT CHANGE UP (ref 22–31)
CREAT SERPL-MCNC: 0.54 MG/DL — SIGNIFICANT CHANGE UP (ref 0.5–1.3)
EC EAEA GENE STL QL NAA+PROBE: DETECTED
EGFR: 102 ML/MIN/1.73M2 — SIGNIFICANT CHANGE UP
EGFR: 102 ML/MIN/1.73M2 — SIGNIFICANT CHANGE UP
EOSINOPHIL # BLD AUTO: 0 K/UL — SIGNIFICANT CHANGE UP (ref 0–0.5)
EOSINOPHIL NFR BLD AUTO: 0 % — SIGNIFICANT CHANGE UP (ref 0–6)
GI PCR PANEL: DETECTED
GLUCOSE SERPL-MCNC: 94 MG/DL — SIGNIFICANT CHANGE UP (ref 70–99)
HCT VFR BLD CALC: 23.6 % — LOW (ref 34.5–45)
HGB BLD-MCNC: 8 G/DL — LOW (ref 11.5–15.5)
IMM GRANULOCYTES # BLD AUTO: 0.06 K/UL — SIGNIFICANT CHANGE UP (ref 0–0.07)
IMM GRANULOCYTES NFR BLD AUTO: 3.6 % — HIGH (ref 0–0.9)
IMMATURE PLATELET FRACTION #: 1 K/UL — LOW (ref 4.7–11.1)
IMMATURE PLATELET FRACTION %: 2.2 % — SIGNIFICANT CHANGE UP (ref 1.6–4.9)
LYMPHOCYTES # BLD AUTO: 0.4 K/UL — LOW (ref 1–3.3)
LYMPHOCYTES NFR BLD AUTO: 24 % — SIGNIFICANT CHANGE UP (ref 13–44)
MAGNESIUM SERPL-MCNC: 1.7 MG/DL — SIGNIFICANT CHANGE UP (ref 1.6–2.6)
MANUAL SMEAR VERIFICATION: SIGNIFICANT CHANGE UP
MCHC RBC-ENTMCNC: 29 PG — SIGNIFICANT CHANGE UP (ref 27–34)
MCHC RBC-ENTMCNC: 33.9 G/DL — SIGNIFICANT CHANGE UP (ref 32–36)
MCV RBC AUTO: 85.5 FL — SIGNIFICANT CHANGE UP (ref 80–100)
MONOCYTES # BLD AUTO: 0.14 K/UL — SIGNIFICANT CHANGE UP (ref 0–0.9)
MONOCYTES NFR BLD AUTO: 8.4 % — SIGNIFICANT CHANGE UP (ref 2–14)
NEUTROPHILS # BLD AUTO: 1.06 K/UL — LOW (ref 1.8–7.4)
NEUTROPHILS NFR BLD AUTO: 63.4 % — SIGNIFICANT CHANGE UP (ref 43–77)
NRBC # BLD AUTO: 0 K/UL — SIGNIFICANT CHANGE UP (ref 0–0)
NRBC # FLD: 0 K/UL — SIGNIFICANT CHANGE UP (ref 0–0)
NRBC BLD AUTO-RTO: 0 /100 WBCS — SIGNIFICANT CHANGE UP (ref 0–0)
PLAT MORPH BLD: NORMAL — SIGNIFICANT CHANGE UP
PLATELET # BLD AUTO: 47 K/UL — LOW (ref 150–400)
PMV BLD: 10.8 FL — SIGNIFICANT CHANGE UP (ref 7–13)
POTASSIUM SERPL-MCNC: 4.2 MMOL/L — SIGNIFICANT CHANGE UP (ref 3.5–5.3)
POTASSIUM SERPL-SCNC: 4.2 MMOL/L — SIGNIFICANT CHANGE UP (ref 3.5–5.3)
RBC # BLD: 2.76 M/UL — LOW (ref 3.8–5.2)
RBC # FLD: 18.6 % — HIGH (ref 10.3–14.5)
RBC BLD AUTO: NORMAL — SIGNIFICANT CHANGE UP
SODIUM SERPL-SCNC: 134 MMOL/L — LOW (ref 135–145)
WBC # BLD: 1.67 K/UL — LOW (ref 3.8–10.5)
WBC # FLD AUTO: 1.67 K/UL — LOW (ref 3.8–10.5)
WBC MORPHOLOGY: NORMAL — SIGNIFICANT CHANGE UP

## 2025-04-28 PROCEDURE — 71045 X-RAY EXAM CHEST 1 VIEW: CPT | Mod: 26

## 2025-04-28 PROCEDURE — 99233 SBSQ HOSP IP/OBS HIGH 50: CPT

## 2025-04-28 RX ADMIN — Medication 1 APPLICATION(S): at 09:41

## 2025-04-28 RX ADMIN — Medication 25 GRAM(S): at 09:41

## 2025-04-28 RX ADMIN — OXYCODONE HYDROCHLORIDE 10 MILLIGRAM(S): 30 TABLET ORAL at 01:30

## 2025-04-28 RX ADMIN — Medication 75 MILLILITER(S): at 17:39

## 2025-04-28 RX ADMIN — Medication 25 GRAM(S): at 17:40

## 2025-04-28 RX ADMIN — Medication 112 MICROGRAM(S): at 05:30

## 2025-04-28 RX ADMIN — Medication 1 DOSE(S): at 21:13

## 2025-04-28 RX ADMIN — Medication 1 APPLICATION(S): at 23:59

## 2025-04-28 RX ADMIN — Medication 75 MILLILITER(S): at 05:31

## 2025-04-28 RX ADMIN — HEPARIN SODIUM 5000 UNIT(S): 1000 INJECTION INTRAVENOUS; SUBCUTANEOUS at 05:30

## 2025-04-28 RX ADMIN — Medication 4 MILLIGRAM(S): at 01:31

## 2025-04-28 RX ADMIN — Medication 25 GRAM(S): at 00:32

## 2025-04-28 NOTE — PROGRESS NOTE ADULT - SUBJECTIVE AND OBJECTIVE BOX
INTERVAL HPI/OVERNIGHT EVENTS:  Patient S&E at bedside.     tmax 101 3 pm,   GI PCR pdetected for EAEC. BCx NTD.   WBC 1.67, Hb 8, Na 134.   Pt was having hallucinations from dilaudid and changed to Percocet for pain,.   Had large BM yesterday.   Repeat cxr today given cough as well.   pt anxious and upset     PAST MEDICAL & SURGICAL HISTORY:  Osteoarthritis  s/p R THR 2011      Hypothyroidism      DM2 (diabetes mellitus, type 2)      White coat syndrome without diagnosis of hypertension      History of total hip replacement, right  2011, [facility]          FAMILY HISTORY:  FH: pancreatic cancer (Father)    FH: COPD (chronic obstructive pulmonary disease) (Mother)        VITAL SIGNS:  T(F): 99 (04-28-25 @ 09:04)  HR: 70 (04-28-25 @ 09:04)  BP: 116/66 (04-28-25 @ 09:04)  RR: 16 (04-28-25 @ 09:04)  SpO2: 95% (04-28-25 @ 09:04)  Wt(kg): --    PHYSICAL EXAM:    GENERAL: anxious, tearful  HEAD:  Atraumatic, Normocephalic  EYES: EOMI,   CHEST/LUNG: Clear to auscultation bilaterally; coughing on deep inspiration   HEART: Regular rate and rhythm;   ABDOMEN: Soft, Nontender,   NEUROLOGY: non-focal    MEDICATIONS  (STANDING):  dextrose 5%. 1000 milliLiter(s) (100 mL/Hr) IV Continuous <Continuous>  dextrose 5%. 1000 milliLiter(s) (50 mL/Hr) IV Continuous <Continuous>  dextrose 50% Injectable 25 Gram(s) IV Push once  dextrose 50% Injectable 12.5 Gram(s) IV Push once  dextrose 50% Injectable 25 Gram(s) IV Push once  fluticasone propionate/ salmeterol 100-50 MICROgram(s) Diskus 1 Dose(s) Inhalation two times a day  glucagon  Injectable 1 milliGRAM(s) IntraMuscular once  levothyroxine 112 MICROGram(s) Oral daily  piperacillin/tazobactam IVPB.. 3.375 Gram(s) IV Intermittent every 8 hours  senna 2 Tablet(s) Oral at bedtime  silver sulfADIAZINE 1% Cream 1 Application(s) Topical two times a day  sodium chloride 0.9%. 1000 milliLiter(s) (75 mL/Hr) IV Continuous <Continuous>  tiotropium 2.5 MICROgram(s) Inhaler 2 Puff(s) Inhalation daily    MEDICATIONS  (PRN):  acetaminophen     Tablet .. 650 milliGRAM(s) Oral every 6 hours PRN Temp greater or equal to 38C (100.4F), Mild Pain (1 - 3)  aluminum hydroxide/magnesium hydroxide/simethicone Suspension 30 milliLiter(s) Oral every 4 hours PRN Dyspepsia  dextrose Oral Gel 15 Gram(s) Oral once PRN Blood Glucose LESS THAN 70 milliGRAM(s)/deciliter  HYDROmorphone  Injectable 1 milliGRAM(s) IV Push every 8 hours PRN for  break through pain  lidocaine 5% Ointment 1 Application(s) Topical every 6 hours PRN pain  loperamide 2 milliGRAM(s) Oral every 3 hours PRN Diarrhea  melatonin 3 milliGRAM(s) Oral at bedtime PRN Insomnia  ondansetron Injectable 4 milliGRAM(s) IV Push every 8 hours PRN Nausea and/or Vomiting  oxycodone    5 mG/acetaminophen 325 mG 1 Tablet(s) Oral every 4 hours PRN Moderate Pain (4 - 6)  oxycodone    5 mG/acetaminophen 325 mG 2 Tablet(s) Oral every 4 hours PRN Severe Pain (7 - 10)  witch hazel Pads 1 Application(s) Topical four times a day PRN pain      Allergies    No Known Allergies    Intolerances        LABS:                        8.0    1.67  )-----------( 47       ( 28 Apr 2025 06:27 )             23.6     04-28    134[L]  |  105  |  5[L]  ----------------------------<  94  4.2   |  24  |  0.54    Ca    8.0[L]      28 Apr 2025 06:27  Phos  2.7     04-27  Mg     1.7     04-28        Urinalysis Basic - ( 28 Apr 2025 06:27 )    Color: x / Appearance: x / SG: x / pH: x  Gluc: 94 mg/dL / Ketone: x  / Bili: x / Urobili: x   Blood: x / Protein: x / Nitrite: x   Leuk Esterase: x / RBC: x / WBC x   Sq Epi: x / Non Sq Epi: x / Bacteria: x        RADIOLOGY & ADDITIONAL TESTS:  Studies reviewed.

## 2025-04-28 NOTE — PROGRESS NOTE ADULT - SUBJECTIVE AND OBJECTIVE BOX
HOSPITALIST ATTENDING PROGRESS NOTE    Chart and meds reviewed.  Patient seen and examined.    CC: fever    Subjective: overnight last fever 101 yest at 15:18 has remained afebrile since then     All other systems reviewed and found to be negative with the exception of what has been described above.    MEDICATIONS  (STANDING):  dextrose 5%. 1000 milliLiter(s) (100 mL/Hr) IV Continuous <Continuous>  dextrose 5%. 1000 milliLiter(s) (50 mL/Hr) IV Continuous <Continuous>  dextrose 50% Injectable 25 Gram(s) IV Push once  dextrose 50% Injectable 12.5 Gram(s) IV Push once  dextrose 50% Injectable 25 Gram(s) IV Push once  fluticasone propionate/ salmeterol 100-50 MICROgram(s) Diskus 1 Dose(s) Inhalation two times a day  glucagon  Injectable 1 milliGRAM(s) IntraMuscular once  levothyroxine 112 MICROGram(s) Oral daily  piperacillin/tazobactam IVPB.. 3.375 Gram(s) IV Intermittent every 8 hours  senna 2 Tablet(s) Oral at bedtime  silver sulfADIAZINE 1% Cream 1 Application(s) Topical two times a day  sodium chloride 0.9%. 1000 milliLiter(s) (75 mL/Hr) IV Continuous <Continuous>  tiotropium 2.5 MICROgram(s) Inhaler 2 Puff(s) Inhalation daily    MEDICATIONS  (PRN):  acetaminophen     Tablet .. 650 milliGRAM(s) Oral every 6 hours PRN Temp greater or equal to 38C (100.4F), Mild Pain (1 - 3)  aluminum hydroxide/magnesium hydroxide/simethicone Suspension 30 milliLiter(s) Oral every 4 hours PRN Dyspepsia  dextrose Oral Gel 15 Gram(s) Oral once PRN Blood Glucose LESS THAN 70 milliGRAM(s)/deciliter  HYDROmorphone  Injectable 1 milliGRAM(s) IV Push every 8 hours PRN for  break through pain  lidocaine 5% Ointment 1 Application(s) Topical every 6 hours PRN pain  loperamide 2 milliGRAM(s) Oral every 3 hours PRN Diarrhea  melatonin 3 milliGRAM(s) Oral at bedtime PRN Insomnia  ondansetron Injectable 4 milliGRAM(s) IV Push every 8 hours PRN Nausea and/or Vomiting  oxycodone    5 mG/acetaminophen 325 mG 1 Tablet(s) Oral every 4 hours PRN Moderate Pain (4 - 6)  oxycodone    5 mG/acetaminophen 325 mG 2 Tablet(s) Oral every 4 hours PRN Severe Pain (7 - 10)  witch hazel Pads 1 Application(s) Topical four times a day PRN pain      VITALS:  T(F): 99 (04-28-25 @ 09:04), Max: 99 (04-28-25 @ 09:04)  HR: 70 (04-28-25 @ 09:04) (61 - 71)  BP: 116/66 (04-28-25 @ 09:04) (88/52 - 116/66)  RR: 16 (04-28-25 @ 09:04) (16 - 18)  SpO2: 95% (04-28-25 @ 09:04) (92% - 99%)  Wt(kg): --    I&O's Summary      CAPILLARY BLOOD GLUCOSE          PHYSICAL EXAM:  Gen: NAD  HEENT:  pupils equal and reactive, EOMI, no oropharyngeal lesions, erythema, exudates, oral thrush  NECK:   supple, no carotid bruits, no palpable lymph nodes, no thyromegaly  CV:  +S1, +S2, regular, no murmurs or rubs  RESP:   lungs clear to auscultation bilaterally, no wheezing, rales, rhonchi, good air entry bilaterally  BREAST:  not examined  GI:  abdomen soft, non-tender, non-distended, normal BS, no bruits, no abdominal masses, no palpable masses  RECTAL:  not examined  :  not examined  MSK:   normal muscle tone, no atrophy, no rigidity, no contractions  EXT:  no clubbing, no cyanosis, no edema, no calf pain, swelling or erythema  VASCULAR:  pulses equal and symmetric in the upper and lower extremities  NEURO:  AAOX3, no focal neurological deficits, follows all commands, able to move extremities spontaneously  SKIN:  no ulcers, lesions or rashes    LABS:                            8.0    1.67  )-----------( 47       ( 28 Apr 2025 06:27 )             23.6     04-28    134[L]  |  105  |  5[L]  ----------------------------<  94  4.2   |  24  |  0.54    Ca    8.0[L]      28 Apr 2025 06:27  Phos  2.7     04-27  Mg     1.7     04-28              Urinalysis Basic - ( 28 Apr 2025 06:27 )    Color: x / Appearance: x / SG: x / pH: x  Gluc: 94 mg/dL / Ketone: x  / Bili: x / Urobili: x   Blood: x / Protein: x / Nitrite: x   Leuk Esterase: x / RBC: x / WBC x   Sq Epi: x / Non Sq Epi: x / Bacteria: x              CULTURES:      Additional results/Imaging, I have personally reviewed:    Telemetry, personally reviewed:

## 2025-04-28 NOTE — PROGRESS NOTE ADULT - ASSESSMENT
63 y/o F w/ PMH of DM2, hypothyroidism, OA, anal cancer (s/p chemo / RT), COPD admitted for:     # Neutropenic fever, Met SIRS criteria on admission.   fever 102 at home, leukopenia   CXR neg   UA neg   F/u BCX: NGTD  No GI PCR sent as P has only  mucus as per RN   neutropenic  precautions  CT A/P: no acute findings has  lower rectum and anus appear thickened and there is hyperemia of the mucosa.  Cefepime changed to Zosyn IV, fevers  better, monitor   D/w Dr Lopez    # Pancytopenia 2/2 chemo  Rectal CA s/p chemo / RT  s/p completion of mitomycin, xeloda and RT wtih LD on Thursdasy 4/24/25   Likely radiation proctitis and dermatitis  C/w silvadene  and Lidocaine, Witch hazel patches PRN   Continue home po percocet   Tylenol PRN   heme onc recs appreciated, ANC little better today, trend CBC  with Diff daily Dy       # Borderline Hypotension Multisectional:  dehydration, poor oral intake, infection and Pain meds  BP improved   C/w PO dilaudid  S/p IVF bolus and Gentle Hydration   Hold JENNIFER meds for now   Monitor closely     # Hyponatremia, improved   #hypokalemia/ Low magnesium   -trend bmp function daily      # Hypothyroidism  - C/w Levothyroxine     # COPD  - Stable, on trelegy at home  - Start advair and spiriva  here       # DM2  - A1c 5.6  - Observe off insulin     # HTN  - Continue losartan    # DVT prophylaxis  - SCDS, add heparin SQ as Ptis high risk

## 2025-04-28 NOTE — PROGRESS NOTE ADULT - ASSESSMENT
65 y/o F w/ PMH of anal cancer s/p mitomycin, xeloda / RT completed 4/24/25, COPD presenting with fevers that started this afternoon, max temperature 101.     # anal cancer  - follows at [facility] [place]   - s/p completion of mitomycin, xeloda and RT with LD on Thursdasy 4/24/25   - anal irritation w/constipation alternating w/diarrhea - added topical lidocaine, silver sulfadiazine and aquafor for soreness  - percocet prn for pain - dc dilaudid given side effects   - docusate for stool softener - pt with significant bm yesterday     # neutropenic fever  - In the ED VSS,  wbc 1.74, hb 9.8, plt 72, na 133, K 3.3 and repleted  - viral panel negative   - UA negative   - CXR negative  - CT a/p without new acute finding   - pt on zosyn  - WBC 1.67, Hb 8, Na 134.   - no indication for neupogen     will follow daily

## 2025-04-28 NOTE — PROGRESS NOTE ADULT - SUBJECTIVE AND OBJECTIVE BOX
Date of Service:04-28-25 @ 13:28  Interval History/ROS: Was febrile 101F yesterday afternoon, but afebrile overnight, comfortable on RA  GI PCR with EAEC, BCx so far negative      REVIEW OF SYSTEMS  [  ] ROS unobtainable because:    [ x ] All other systems negative except as noted below    Constitutional:  [ ] fever [ ] chills  [ ] weight loss  [ ]night sweat  [ ]poor appetite/PO intake [ ]fatigue   Skin:  [ ] rash [ ] phlebitis	  Eyes: [ ] icterus [ ] pain  [ ] discharge	  ENMT: [ ] sore throat  [ ] thrush [ ] ulcers [ ] exudates [ ]anosmia  Respiratory: [ ] dyspnea [ ] hemoptysis [ ] cough [ ] sputum	  Cardiovascular:  [ ] chest pain [ ] palpitations [ ] edema	  Gastrointestinal:  [ ] nausea [ ] vomiting [ ] diarrhea [ ] constipation [ ] pain	  Genitourinary:  [ ] dysuria [ ] frequency [ ] hematuria [ ] discharge [ ] flank pain  [ ] incontinence  Musculoskeletal:  [ ] myalgias [ ] arthralgias [ ] arthritis  [ ] back pain  Neurological:  [ ] headache [ ] weakness [ ] seizures  [ ] confusion/altered mental status    Allergies  No Known Allergies        ANTIMICROBIALS:    piperacillin/tazobactam IVPB.. 3.375 every 8 hours        OTHER MEDS: MEDICATIONS  (STANDING):  acetaminophen     Tablet .. 650 every 6 hours PRN  aluminum hydroxide/magnesium hydroxide/simethicone Suspension 30 every 4 hours PRN  dextrose 50% Injectable 25 once  dextrose 50% Injectable 12.5 once  dextrose 50% Injectable 25 once  dextrose Oral Gel 15 once PRN  fluticasone propionate/ salmeterol 100-50 MICROgram(s) Diskus 1 two times a day  glucagon  Injectable 1 once  HYDROmorphone  Injectable 1 every 8 hours PRN  levothyroxine 112 daily  loperamide 2 every 3 hours PRN  melatonin 3 at bedtime PRN  ondansetron Injectable 4 every 8 hours PRN  oxycodone    5 mG/acetaminophen 325 mG 1 every 4 hours PRN  oxycodone    5 mG/acetaminophen 325 mG 2 every 4 hours PRN  senna 2 at bedtime  tiotropium 2.5 MICROgram(s) Inhaler 2 daily      Vital Signs Last 24 Hrs  T(F): 99 (04-28-25 @ 09:04), Max: 102.6 (04-26-25 @ 19:51)    Vital Signs Last 24 Hrs  HR: 70 (04-28-25 @ 09:04) (61 - 75)  BP: 116/66 (04-28-25 @ 09:04) (88/52 - 116/66)  RR: 16 (04-28-25 @ 09:04)  SpO2: 95% (04-28-25 @ 09:04) (92% - 99%)  Wt(kg): --    EXAM:    Constitutional: frail, NAD  Head/Eyes: no icterus  LUNGS:  CTA  CVS:  regular rhythm  Abd:  soft, non-tender; non-distended  Ext:  no edema  Vascular:  IV site no erythema tenderness or discharge  Neuro: AAO X 3, non- focal    Labs:                        8.0    1.67  )-----------( 47       ( 28 Apr 2025 06:27 )             23.6     04-28    134[L]  |  105  |  5[L]  ----------------------------<  94  4.2   |  24  |  0.54    Ca    8.0[L]      28 Apr 2025 06:27  Phos  2.7     04-27  Mg     1.7     04-28        WBC Trend:  WBC Count: 1.67 (04-28-25 @ 06:27)  WBC Count: 1.44 (04-27-25 @ 08:12)  WBC Count: 1.46 (04-26-25 @ 06:48)  WBC Count: Not measured (04-26-25 @ 06:48)      Creatine Trend:  Creatinine: 0.54 (04-28)  Creatinine: 0.52 (04-27)  Creatinine: 0.45 (04-26)  Creatinine: 0.54 (04-25)      Liver Biochemical Testing Trend:  Alanine Aminotransferase (ALT/SGPT): 30 (04-26)  Alanine Aminotransferase (ALT/SGPT): 35 (04-25)  Alanine Aminotransferase (ALT/SGPT): 53 (04-03)  Alanine Aminotransferase (ALT/SGPT): 48 (04-02)  Alanine Aminotransferase (ALT/SGPT): 61 (04-01)  Aspartate Aminotransferase (AST/SGOT): 27 (04-26-25 @ 06:48)  Aspartate Aminotransferase (AST/SGOT): 38 (04-25-25 @ 20:41)  Aspartate Aminotransferase (AST/SGOT): 51 (04-03-25 @ 06:26)  Aspartate Aminotransferase (AST/SGOT): 38 (04-02-25 @ 09:17)  Aspartate Aminotransferase (AST/SGOT): 58 (04-01-25 @ 09:52)  Bilirubin Total: 0.4 (04-26)  Bilirubin Total: 0.6 (04-25)  Bilirubin Total: 0.3 (04-03)  Bilirubin Total: 0.4 (04-02)  Bilirubin Total: 0.4 (04-01)      Trend LDH      Urinalysis Basic - ( 28 Apr 2025 06:27 )    Color: x / Appearance: x / SG: x / pH: x  Gluc: 94 mg/dL / Ketone: x  / Bili: x / Urobili: x   Blood: x / Protein: x / Nitrite: x   Leuk Esterase: x / RBC: x / WBC x   Sq Epi: x / Non Sq Epi: x / Bacteria: x        MICROBIOLOGY:        Culture - Blood (collected 25 Apr 2025 21:44)  Source: Blood None  Preliminary Report:    No growth at 48 Hours    Urinalysis with Rflx Culture (collected 25 Apr 2025 21:23)    Culture - Blood (collected 25 Apr 2025 20:41)  Source: Blood None  Preliminary Report:    No growth at 48 Hours    Urinalysis with Rflx Culture (collected 30 Mar 2025 20:55)    Culture - Blood (collected 30 Mar 2025 18:56)  Source: Blood None  Final Report:    No growth at 5 days    Culture - Blood (collected 30 Mar 2025 18:56)  Source: Blood None  Final Report:    No growth at 5 days      Lactate, Blood: 1.2 (04-25 @ 20:41)        RADIOLOGY:  imaging below personally reviewed    Xray Chest 1 View- PORTABLE-Urgent:  (25 Apr 2025 21:12)        CT Abdomen and Pelvis w/ IV Cont:   ACC: [id number] EXAM:  CT ABDOMEN AND PELVIS IC   ORDERED BY: JOSÉ MIGUEL IRAHETA     PROCEDURE DATE:  04/25/2025          INTERPRETATION:  CLINICAL INFORMATION: Lower abdominal pain. History of   anal cancer on chemotherapy.    COMPARISON: CT abdomen and pelvis 3/30/2025.    CONTRAST/COMPLICATIONS:  IV Contrast: Omnipaque 350  90 cc administered   0 cc discarded  Oral Contrast: NONE  .    PROCEDURE:  CT of the Abdomen and Pelvis was performed.  Sagittal and coronal reformats were performed.    FINDINGS:  LOWER CHEST: Within normal limits.    LIVER: Within normal limits.  BILE DUCTS: Normal caliber.  GALLBLADDER: Within normal limits.  SPLEEN: Within normal limits.  PANCREAS: Within normal limits.  ADRENALS: Within normal limits.  KIDNEYS/URETERS: Small left renal cyst.    BLADDER: Within normal limits.  REPRODUCTIVE ORGANS: Uterus and adnexa within normal limits.    BOWEL: Sigmoid diverticulosis and thickening of the sigmoid colon   possibly on the basis of chronic diverticular disease. No evidence for   acute diverticulitis. The lower rectum and anus appear thickened and   there is hyperemia of the mucosa. This may be related to post radiation   changes. Similar to prior exam. Evaluation limited secondary to streak   artifact from right hip prosthesis. No bowel obstruction. Appendix is   normal.  PERITONEUM/RETROPERITONEUM: Within normal limits.  VESSELS: Atherosclerotic changes.  LYMPH NODES: No lymphadenopathy.  ABDOMINAL WALL: Within normal limits.  BONES: Degenerative changes. Right hip replacement.    IMPRESSION:    Stable examination when compared to the prior exam of 3/30/2025. No new   acute finding.      --- End of Report ---      AZEB ISAACS MD; Attending Radiologist  This document has been electronically signed. Apr 25 2025 10:57PM (04-25-25 @ 22:09)     Date of Service:04-28-25 @ 13:28  Interval History/ROS: Was febrile 101F yesterday afternoon, but afebrile overnight, comfortable on RA  GI PCR with EAEC, BCx so far negative  Reports today she feels much better overall, denies any fever or chills, but still worried about her loose stools, especially when she eats something. Her loose stools are better but still not normal    REVIEW OF SYSTEMS  [  ] ROS unobtainable because:    [ x ] All other systems negative except as noted below    Constitutional:  [ ] fever [ ] chills  [ ] weight loss  [ ]night sweat  [ ]poor appetite/PO intake [ ]fatigue   Skin:  [ ] rash [ ] phlebitis	  Eyes: [ ] icterus [ ] pain  [ ] discharge	  ENMT: [ ] sore throat  [ ] thrush [ ] ulcers [ ] exudates [ ]anosmia  Respiratory: [ ] dyspnea [ ] hemoptysis [ ] cough [ ] sputum	  Cardiovascular:  [ ] chest pain [ ] palpitations [ ] edema	  Gastrointestinal:  [ ] nausea [ ] vomiting [ ] diarrhea [ ] constipation [ ] pain	  Genitourinary:  [ ] dysuria [ ] frequency [ ] hematuria [ ] discharge [ ] flank pain  [ ] incontinence  Musculoskeletal:  [ ] myalgias [ ] arthralgias [ ] arthritis  [ ] back pain  Neurological:  [ ] headache [ ] weakness [ ] seizures  [ ] confusion/altered mental status    Allergies  No Known Allergies        ANTIMICROBIALS:    piperacillin/tazobactam IVPB.. 3.375 every 8 hours        OTHER MEDS: MEDICATIONS  (STANDING):  acetaminophen     Tablet .. 650 every 6 hours PRN  aluminum hydroxide/magnesium hydroxide/simethicone Suspension 30 every 4 hours PRN  dextrose 50% Injectable 25 once  dextrose 50% Injectable 12.5 once  dextrose 50% Injectable 25 once  dextrose Oral Gel 15 once PRN  fluticasone propionate/ salmeterol 100-50 MICROgram(s) Diskus 1 two times a day  glucagon  Injectable 1 once  HYDROmorphone  Injectable 1 every 8 hours PRN  levothyroxine 112 daily  loperamide 2 every 3 hours PRN  melatonin 3 at bedtime PRN  ondansetron Injectable 4 every 8 hours PRN  oxycodone    5 mG/acetaminophen 325 mG 1 every 4 hours PRN  oxycodone    5 mG/acetaminophen 325 mG 2 every 4 hours PRN  senna 2 at bedtime  tiotropium 2.5 MICROgram(s) Inhaler 2 daily      Vital Signs Last 24 Hrs  T(F): 99 (04-28-25 @ 09:04), Max: 102.6 (04-26-25 @ 19:51)    Vital Signs Last 24 Hrs  HR: 70 (04-28-25 @ 09:04) (61 - 75)  BP: 116/66 (04-28-25 @ 09:04) (88/52 - 116/66)  RR: 16 (04-28-25 @ 09:04)  SpO2: 95% (04-28-25 @ 09:04) (92% - 99%)  Wt(kg): --    EXAM:    Constitutional: frail, NAD  Head/Eyes: no icterus  LUNGS:  CTA  CVS:  regular rhythm  Abd:  soft, non-tender; non-distended  Ext:  no edema  Vascular:  IV site no erythema tenderness or discharge  Neuro: AAO X 3, non- focal    Labs:                        8.0    1.67  )-----------( 47       ( 28 Apr 2025 06:27 )             23.6     04-28    134[L]  |  105  |  5[L]  ----------------------------<  94  4.2   |  24  |  0.54    Ca    8.0[L]      28 Apr 2025 06:27  Phos  2.7     04-27  Mg     1.7     04-28        WBC Trend:  WBC Count: 1.67 (04-28-25 @ 06:27)  WBC Count: 1.44 (04-27-25 @ 08:12)  WBC Count: 1.46 (04-26-25 @ 06:48)  WBC Count: Not measured (04-26-25 @ 06:48)      Creatine Trend:  Creatinine: 0.54 (04-28)  Creatinine: 0.52 (04-27)  Creatinine: 0.45 (04-26)  Creatinine: 0.54 (04-25)      Liver Biochemical Testing Trend:  Alanine Aminotransferase (ALT/SGPT): 30 (04-26)  Alanine Aminotransferase (ALT/SGPT): 35 (04-25)  Alanine Aminotransferase (ALT/SGPT): 53 (04-03)  Alanine Aminotransferase (ALT/SGPT): 48 (04-02)  Alanine Aminotransferase (ALT/SGPT): 61 (04-01)  Aspartate Aminotransferase (AST/SGOT): 27 (04-26-25 @ 06:48)  Aspartate Aminotransferase (AST/SGOT): 38 (04-25-25 @ 20:41)  Aspartate Aminotransferase (AST/SGOT): 51 (04-03-25 @ 06:26)  Aspartate Aminotransferase (AST/SGOT): 38 (04-02-25 @ 09:17)  Aspartate Aminotransferase (AST/SGOT): 58 (04-01-25 @ 09:52)  Bilirubin Total: 0.4 (04-26)  Bilirubin Total: 0.6 (04-25)  Bilirubin Total: 0.3 (04-03)  Bilirubin Total: 0.4 (04-02)  Bilirubin Total: 0.4 (04-01)      Trend LDH      Urinalysis Basic - ( 28 Apr 2025 06:27 )    Color: x / Appearance: x / SG: x / pH: x  Gluc: 94 mg/dL / Ketone: x  / Bili: x / Urobili: x   Blood: x / Protein: x / Nitrite: x   Leuk Esterase: x / RBC: x / WBC x   Sq Epi: x / Non Sq Epi: x / Bacteria: x        MICROBIOLOGY:        Culture - Blood (collected 25 Apr 2025 21:44)  Source: Blood None  Preliminary Report:    No growth at 48 Hours    Urinalysis with Rflx Culture (collected 25 Apr 2025 21:23)    Culture - Blood (collected 25 Apr 2025 20:41)  Source: Blood None  Preliminary Report:    No growth at 48 Hours    Urinalysis with Rflx Culture (collected 30 Mar 2025 20:55)    Culture - Blood (collected 30 Mar 2025 18:56)  Source: Blood None  Final Report:    No growth at 5 days    Culture - Blood (collected 30 Mar 2025 18:56)  Source: Blood None  Final Report:    No growth at 5 days      Lactate, Blood: 1.2 (04-25 @ 20:41)        RADIOLOGY:  imaging below personally reviewed    Xray Chest 1 View- PORTABLE-Urgent:  (25 Apr 2025 21:12)        CT Abdomen and Pelvis w/ IV Cont:   ACC: [id number] EXAM:  CT ABDOMEN AND PELVIS IC   ORDERED BY: JOSÉ MIGUEL IRAHETA     PROCEDURE DATE:  04/25/2025          INTERPRETATION:  CLINICAL INFORMATION: Lower abdominal pain. History of   anal cancer on chemotherapy.    COMPARISON: CT abdomen and pelvis 3/30/2025.    CONTRAST/COMPLICATIONS:  IV Contrast: Omnipaque 350  90 cc administered   0 cc discarded  Oral Contrast: NONE  .    PROCEDURE:  CT of the Abdomen and Pelvis was performed.  Sagittal and coronal reformats were performed.    FINDINGS:  LOWER CHEST: Within normal limits.    LIVER: Within normal limits.  BILE DUCTS: Normal caliber.  GALLBLADDER: Within normal limits.  SPLEEN: Within normal limits.  PANCREAS: Within normal limits.  ADRENALS: Within normal limits.  KIDNEYS/URETERS: Small left renal cyst.    BLADDER: Within normal limits.  REPRODUCTIVE ORGANS: Uterus and adnexa within normal limits.    BOWEL: Sigmoid diverticulosis and thickening of the sigmoid colon   possibly on the basis of chronic diverticular disease. No evidence for   acute diverticulitis. The lower rectum and anus appear thickened and   there is hyperemia of the mucosa. This may be related to post radiation   changes. Similar to prior exam. Evaluation limited secondary to streak   artifact from right hip prosthesis. No bowel obstruction. Appendix is   normal.  PERITONEUM/RETROPERITONEUM: Within normal limits.  VESSELS: Atherosclerotic changes.  LYMPH NODES: No lymphadenopathy.  ABDOMINAL WALL: Within normal limits.  BONES: Degenerative changes. Right hip replacement.    IMPRESSION:    Stable examination when compared to the prior exam of 3/30/2025. No new   acute finding.      --- End of Report ---      AZEB ISAACS MD; Attending Radiologist  This document has been electronically signed. Apr 25 2025 10:57PM (04-25-25 @ 22:09)

## 2025-04-28 NOTE — PROGRESS NOTE ADULT - ASSESSMENT
Assessment:  64F with DM2, hypothyroidism, OA, anal cancer (s/p chemo / RT), COPD presents 4/26 with fever Tmax 101 after chemo the day prior  Overall, denies any cough, abdominal pain, n/v, dysuria.   No known recent sick contact  Afebrile on admission, on RA  WBC 1.46  Cr 0.54  UA negative  CTAP negative  CXR clear  RVP negative  GI PCR with EAEC  BCx 4/25 negative    Antimicrobials:  cefepime  Injectable. 2000 every 12 hours (4/25 --- ) Zosyn (4/27 --- )    Impression:   #Neutropenic fever  #Diarrhea, EAEC,  Acute Gastroenteritis  #Pancytopenia 2/2 chemo  #Rectal CA s/p chemo / RT    Recommendations:  - continue empiric Zosyn 3.375 q8 (Day #2)  - monitor temperature curve  - trend WBC, ANC  - reason for abx use reviewed with patient  - side effects of antibiotic discussed, tolerating abx well so far  - Prior cultures reviewed. An epidemiologic assessment was performed. There is a significant risk for resistant microorganisms to spread to family members, and/or healthcare staff. Isolation precautions based on infection control policy. Will reconsider further isolation measures based on new culture results and other clinical data as appropriate Appropriate cultures collected and an appropriate broad spectrum antibiotic therapy will be considered  - follow up Onc  - rest per primary team    Clinical team may change from intravenous to oral antibiotics when the following criteria are met:   1. Patient is clinically improving/stable       a)	Improved signs and symptoms of infection from initial presentation       b)	Afebrile for 24 hours       c)	Leukocytosis trending towards normal range   2. Patient is tolerating oral intake   3. Initial/repeat blood cultures are negative OR do not need to wait for preliminary blood cultures to result    pending improvement
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
## 2025-04-29 ENCOUNTER — TRANSCRIPTION ENCOUNTER (OUTPATIENT)
Age: 65
End: 2025-04-29

## 2025-04-29 VITALS
HEART RATE: 62 BPM | OXYGEN SATURATION: 95 % | DIASTOLIC BLOOD PRESSURE: 58 MMHG | RESPIRATION RATE: 17 BRPM | SYSTOLIC BLOOD PRESSURE: 104 MMHG | TEMPERATURE: 98 F

## 2025-04-29 LAB
ADD ON TEST-SPECIMEN IN LAB: SIGNIFICANT CHANGE UP
ANION GAP SERPL CALC-SCNC: 3 MMOL/L — LOW (ref 5–17)
BASOPHILS # BLD AUTO: 0.02 K/UL — SIGNIFICANT CHANGE UP (ref 0–0.2)
BASOPHILS NFR BLD AUTO: 1 % — SIGNIFICANT CHANGE UP (ref 0–2)
BUN SERPL-MCNC: 4 MG/DL — LOW (ref 7–23)
CALCIUM SERPL-MCNC: 8.1 MG/DL — LOW (ref 8.5–10.1)
CHLORIDE SERPL-SCNC: 113 MMOL/L — HIGH (ref 96–108)
CO2 SERPL-SCNC: 24 MMOL/L — SIGNIFICANT CHANGE UP (ref 22–31)
CREAT SERPL-MCNC: 0.52 MG/DL — SIGNIFICANT CHANGE UP (ref 0.5–1.3)
EGFR: 103 ML/MIN/1.73M2 — SIGNIFICANT CHANGE UP
EGFR: 103 ML/MIN/1.73M2 — SIGNIFICANT CHANGE UP
EOSINOPHIL # BLD AUTO: 0.01 K/UL — SIGNIFICANT CHANGE UP (ref 0–0.5)
EOSINOPHIL NFR BLD AUTO: 0.5 % — SIGNIFICANT CHANGE UP (ref 0–6)
GLUCOSE SERPL-MCNC: 97 MG/DL — SIGNIFICANT CHANGE UP (ref 70–99)
HCT VFR BLD CALC: 24.7 % — LOW (ref 34.5–45)
HCT VFR BLD CALC: SIGNIFICANT CHANGE UP (ref 34.5–45)
HGB BLD-MCNC: 8.2 G/DL — LOW (ref 11.5–15.5)
HGB BLD-MCNC: SIGNIFICANT CHANGE UP (ref 11.5–15.5)
IMM GRANULOCYTES # BLD AUTO: 0.04 K/UL — SIGNIFICANT CHANGE UP (ref 0–0.07)
IMM GRANULOCYTES NFR BLD AUTO: 2.1 % — HIGH (ref 0–0.9)
IMMATURE PLATELET FRACTION #: 1.2 K/UL — LOW (ref 4.7–11.1)
IMMATURE PLATELET FRACTION #: 1.8 K/UL — LOW (ref 4.7–11.1)
IMMATURE PLATELET FRACTION %: 2.1 % — SIGNIFICANT CHANGE UP (ref 1.6–4.9)
IMMATURE PLATELET FRACTION %: 3.2 % — SIGNIFICANT CHANGE UP (ref 1.6–4.9)
LYMPHOCYTES # BLD AUTO: 0.64 K/UL — LOW (ref 1–3.3)
LYMPHOCYTES NFR BLD AUTO: 33.3 % — SIGNIFICANT CHANGE UP (ref 13–44)
MCHC RBC-ENTMCNC: 28.8 PG — SIGNIFICANT CHANGE UP (ref 27–34)
MCHC RBC-ENTMCNC: 33.2 G/DL — SIGNIFICANT CHANGE UP (ref 32–36)
MCHC RBC-ENTMCNC: SIGNIFICANT CHANGE UP (ref 27–34)
MCHC RBC-ENTMCNC: SIGNIFICANT CHANGE UP G/DL (ref 32–36)
MCV RBC AUTO: 86.7 FL — SIGNIFICANT CHANGE UP (ref 80–100)
MCV RBC AUTO: SIGNIFICANT CHANGE UP (ref 80–100)
MONOCYTES # BLD AUTO: 0.18 K/UL — SIGNIFICANT CHANGE UP (ref 0–0.9)
MONOCYTES NFR BLD AUTO: 9.4 % — SIGNIFICANT CHANGE UP (ref 2–14)
NEUTROPHILS # BLD AUTO: 1.03 K/UL — LOW (ref 1.8–7.4)
NEUTROPHILS NFR BLD AUTO: 53.7 % — SIGNIFICANT CHANGE UP (ref 43–77)
NRBC # BLD AUTO: 0 K/UL — SIGNIFICANT CHANGE UP (ref 0–0)
NRBC # BLD AUTO: 0 K/UL — SIGNIFICANT CHANGE UP (ref 0–0)
NRBC # FLD: 0 K/UL — SIGNIFICANT CHANGE UP (ref 0–0)
NRBC # FLD: 0 K/UL — SIGNIFICANT CHANGE UP (ref 0–0)
NRBC BLD AUTO-RTO: 0 /100 WBCS — SIGNIFICANT CHANGE UP (ref 0–0)
NRBC BLD AUTO-RTO: 0 /100 WBCS — SIGNIFICANT CHANGE UP (ref 0–0)
PLATELET # BLD AUTO: 56 K/UL — LOW (ref 150–400)
PLATELET # BLD AUTO: SIGNIFICANT CHANGE UP K/UL (ref 150–400)
PMV BLD: 10.7 FL — SIGNIFICANT CHANGE UP (ref 7–13)
PMV BLD: 11.2 FL — SIGNIFICANT CHANGE UP (ref 7–13)
POTASSIUM SERPL-MCNC: 3.6 MMOL/L — SIGNIFICANT CHANGE UP (ref 3.5–5.3)
POTASSIUM SERPL-SCNC: 3.6 MMOL/L — SIGNIFICANT CHANGE UP (ref 3.5–5.3)
RBC # BLD: 2.85 M/UL — LOW (ref 3.8–5.2)
RBC # BLD: SIGNIFICANT CHANGE UP (ref 3.8–5.2)
RBC # FLD: 19.3 % — HIGH (ref 10.3–14.5)
RBC # FLD: SIGNIFICANT CHANGE UP % (ref 10.3–14.5)
SODIUM SERPL-SCNC: 140 MMOL/L — SIGNIFICANT CHANGE UP (ref 135–145)
WBC # BLD: 1.92 K/UL — LOW (ref 3.8–10.5)
WBC # BLD: SIGNIFICANT CHANGE UP K/UL (ref 3.8–10.5)
WBC # FLD AUTO: 1.92 K/UL — LOW (ref 3.8–10.5)
WBC # FLD AUTO: SIGNIFICANT CHANGE UP K/UL (ref 3.8–10.5)

## 2025-04-29 PROCEDURE — 99239 HOSP IP/OBS DSCHRG MGMT >30: CPT

## 2025-04-29 RX ORDER — ALPRAZOLAM 0.5 MG
1 TABLET, EXTENDED RELEASE 24 HR ORAL
Qty: 12 | Refills: 0
Start: 2025-04-29 | End: 2025-05-02

## 2025-04-29 RX ORDER — ALPRAZOLAM 0.5 MG
0.25 TABLET, EXTENDED RELEASE 24 HR ORAL EVERY 8 HOURS
Refills: 0 | Status: DISCONTINUED | OUTPATIENT
Start: 2025-04-29 | End: 2025-04-29

## 2025-04-29 RX ADMIN — Medication 25 GRAM(S): at 00:44

## 2025-04-29 RX ADMIN — Medication 25 GRAM(S): at 09:01

## 2025-04-29 RX ADMIN — Medication 500 MILLILITER(S): at 01:03

## 2025-04-29 RX ADMIN — Medication 112 MICROGRAM(S): at 06:51

## 2025-04-29 RX ADMIN — LIDOCAINE HYDROCHLORIDE 1 APPLICATION(S): 20 JELLY TOPICAL at 11:58

## 2025-04-29 RX ADMIN — Medication 0.25 MILLIGRAM(S): at 09:00

## 2025-04-29 RX ADMIN — Medication 1 APPLICATION(S): at 10:00

## 2025-04-29 NOTE — DISCHARGE NOTE NURSING/CASE MANAGEMENT/SOCIAL WORK - PATIENT PORTAL LINK FT
You can access the FollowMyHealth Patient Portal offered by [facility] by registering at the following website: [email or web address]. By joining [facility]’s FollowMyHealth portal, you will also be able to view your health information using other applications (apps) compatible with our system.

## 2025-04-29 NOTE — DISCHARGE NOTE PROVIDER - NSDCFUADDAPPT_GEN_ALL_CORE_FT
From: Connie Brady  To: Chavo Miranda MD  Sent: 7/19/2021 12:09 PM CDT  Subject: Visit [facility] Dr. Tamayo Bears you are doing well. My CT scan is scheduled for tomorrow morning.  If it's ok can I skip by not drinking the bar APPTS ARE READY TO BE MADE: [X] YES    Best Family or Patient Contact (if needed):    Additional Information about above appointments (if needed):    1:Dr. Valdivia 1-2 weeks   2:Your oncologist 1-2 weeks   3:  APPTS ARE READY TO BE MADE: [X] YES    Best Family or Patient Contact (if needed):    Additional Information about above appointments (if needed):    1:Dr. Valdivia 1-2 weeks   2:Your oncologist 1-2 weeks   3:       Patient was outreached but did not answer. A voicemail was left for the patient to return our call.   APPTS ARE READY TO BE MADE: [X] YES    Best Family or Patient Contact (if needed):    Additional Information about above appointments (if needed):    1:Dr. Valdivia 1-2 weeks   2:Your oncologist 1-2 weeks   3:       Patient was outreached but did not answer. A voicemail was left for the patient to return our call.    Provided patient with provider referral information, however patient prefers to schedule the appointments on their own.

## 2025-04-29 NOTE — DISCHARGE NOTE NURSING/CASE MANAGEMENT/SOCIAL WORK - NSDCPEFALRISK_GEN_ALL_CORE
For information on Fall & Injury Prevention, visit: [email or web address] OR  [email or web address] OR  https://www.cdc.gov/steadi/patient.html

## 2025-04-29 NOTE — PROGRESS NOTE ADULT - SUBJECTIVE AND OBJECTIVE BOX
INTERVAL HPI/OVERNIGHT EVENTS:  Patient S&E at bedside. No o/n events,   pt reports more anxiety- takes xanax prn at home and added  iron studies reviewed and c/w anemia of chronic inflammation   had one bm this am which was painful   last fever 4/27 at 3 pm   cxr reviewed     PAST MEDICAL & SURGICAL HISTORY:  Osteoarthritis  s/p R THR 2011      Hypothyroidism      DM2 (diabetes mellitus, type 2)      White coat syndrome without diagnosis of hypertension      History of total hip replacement, right  2011, [facility]          FAMILY HISTORY:  FH: pancreatic cancer (Father)    FH: COPD (chronic obstructive pulmonary disease) (Mother)        VITAL SIGNS:  T(F): 97.3 (04-29-25 @ 07:35)  HR: 60 (04-29-25 @ 07:35)  BP: 111/60 (04-29-25 @ 07:35)  RR: 18 (04-29-25 @ 07:35)  SpO2: 97% (04-29-25 @ 07:35)  Wt(kg): --    PHYSICAL EXAM:  GENERAL: anxious,   HEAD:  Atraumatic, Normocephalic  EYES: EOMI,   CHEST/LUNG: Clear to auscultation bilaterally; mild pain on deep inspiration- on 1l nc  HEART: Regular rate and rhythm;   ABDOMEN: Soft, Nontender,   NEUROLOGY: non-focal      MEDICATIONS  (STANDING):  dextrose 5%. 1000 milliLiter(s) (100 mL/Hr) IV Continuous <Continuous>  dextrose 5%. 1000 milliLiter(s) (50 mL/Hr) IV Continuous <Continuous>  dextrose 50% Injectable 25 Gram(s) IV Push once  dextrose 50% Injectable 12.5 Gram(s) IV Push once  dextrose 50% Injectable 25 Gram(s) IV Push once  fluticasone propionate/ salmeterol 100-50 MICROgram(s) Diskus 1 Dose(s) Inhalation two times a day  glucagon  Injectable 1 milliGRAM(s) IntraMuscular once  levothyroxine 112 MICROGram(s) Oral daily  piperacillin/tazobactam IVPB.. 3.375 Gram(s) IV Intermittent every 8 hours  senna 2 Tablet(s) Oral at bedtime  silver sulfADIAZINE 1% Cream 1 Application(s) Topical two times a day  sodium chloride 0.9%. 1000 milliLiter(s) (75 mL/Hr) IV Continuous <Continuous>  sodium chloride 0.9%. 1000 milliLiter(s) (500 mL/Hr) IV Continuous <Continuous>  tiotropium 2.5 MICROgram(s) Inhaler 2 Puff(s) Inhalation daily    MEDICATIONS  (PRN):  acetaminophen     Tablet .. 650 milliGRAM(s) Oral every 6 hours PRN Temp greater or equal to 38C (100.4F), Mild Pain (1 - 3)  ALPRAZolam 0.25 milliGRAM(s) Oral every 8 hours PRN anxiety  aluminum hydroxide/magnesium hydroxide/simethicone Suspension 30 milliLiter(s) Oral every 4 hours PRN Dyspepsia  dextrose Oral Gel 15 Gram(s) Oral once PRN Blood Glucose LESS THAN 70 milliGRAM(s)/deciliter  HYDROmorphone  Injectable 1 milliGRAM(s) IV Push every 8 hours PRN for  break through pain  lidocaine 5% Ointment 1 Application(s) Topical every 6 hours PRN pain  loperamide 2 milliGRAM(s) Oral every 3 hours PRN Diarrhea  melatonin 3 milliGRAM(s) Oral at bedtime PRN Insomnia  ondansetron Injectable 4 milliGRAM(s) IV Push every 8 hours PRN Nausea and/or Vomiting  oxycodone    5 mG/acetaminophen 325 mG 1 Tablet(s) Oral every 4 hours PRN Moderate Pain (4 - 6)  oxycodone    5 mG/acetaminophen 325 mG 2 Tablet(s) Oral every 4 hours PRN Severe Pain (7 - 10)  witch hazel Pads 1 Application(s) Topical four times a day PRN pain      Allergies    No Known Allergies    Intolerances        LABS:                        8.2    1.92  )-----------( 56       ( 29 Apr 2025 07:15 )             24.7     04-29    140  |  113[H]  |  4[L]  ----------------------------<  97  3.6   |  24  |  0.52    Ca    8.1[L]      29 Apr 2025 07:15  Mg     1.7     04-28        Urinalysis Basic - ( 29 Apr 2025 07:15 )    Color: x / Appearance: x / SG: x / pH: x  Gluc: 97 mg/dL / Ketone: x  / Bili: x / Urobili: x   Blood: x / Protein: x / Nitrite: x   Leuk Esterase: x / RBC: x / WBC x   Sq Epi: x / Non Sq Epi: x / Bacteria: x        RADIOLOGY & ADDITIONAL TESTS:  Studies reviewed.

## 2025-04-29 NOTE — DISCHARGE NOTE PROVIDER - NSDCCPCAREPLAN_GEN_ALL_CORE_FT
PRINCIPAL DISCHARGE DIAGNOSIS  Diagnosis: Neutropenic fever  Assessment and Plan of Treatment: You were diagnosed with neutropenic fever. You were found to have no infection and are safe for discharge. Please however continue to take precautions at home as your white blood cell count improves. Please clean your hands often especially after being around others or in public spaces. Please avoid any friends or family memebers who maybe sick. Please maintain a healthy diet and throrouhgly wash fuirt and vegetables and cook meat at home.

## 2025-04-29 NOTE — DISCHARGE NOTE NURSING/CASE MANAGEMENT/SOCIAL WORK - NSDCFUADDAPPT_GEN_ALL_CORE_FT
APPTS ARE READY TO BE MADE: [X] YES    Best Family or Patient Contact (if needed):    Additional Information about above appointments (if needed):    1:Dr. Valdivia 1-2 weeks   2:Your oncologist 1-2 weeks   3:       Patient was outreached but did not answer. A voicemail was left for the patient to return our call.    Provided patient with provider referral information, however patient prefers to schedule the appointments on their own.

## 2025-04-29 NOTE — PROVIDER CONTACT NOTE (OTHER) - SITUATION
Patient Temperature 102.6 despite given PO tylenol 1541
pt alert and oriented x4. c/o slight dizziness. no c/o pain, no s/s distress

## 2025-04-29 NOTE — DISCHARGE NOTE PROVIDER - DETAILS OF MALNUTRITION DIAGNOSIS/DIAGNOSES
This patient has been assessed with a concern for Malnutrition and was treated during this hospitalization for the following Nutrition diagnosis/diagnoses:     -  04/26/2025: Moderate protein-calorie malnutrition

## 2025-04-29 NOTE — PROVIDER CONTACT NOTE (OTHER) - ASSESSMENT
BP 89/46  HR 62   RR 18  O2 89% on room air   temp 97.7
106/53 (right arm)   HR 79   O2 93 % Room air   Temperature 102.6F (O)

## 2025-04-29 NOTE — DISCHARGE NOTE PROVIDER - CARE PROVIDER_API CALL
Sky Valdivia  Internal Medicine  [address] [place] [id number]  Phone: [phone]  Fax: [phone]  Follow Up Time: 2 weeks

## 2025-04-29 NOTE — PROGRESS NOTE ADULT - ASSESSMENT
63 y/o F w/ PMH of anal cancer s/p mitomycin, xeloda / RT completed 4/24/25, COPD presenting with fevers that started this afternoon, max temperature 101.     # anal cancer  - follows at [facility]   - s/p completion of mitomycin, xeloda and RT with LD on Thursdasy 4/24/25   - anal irritation w/constipation alternating w/diarrhea - added topical lidocaine, silver sulfadiazine and aquafor for soreness  - percocet prn for pain - dc dilaudid given side effects   - docusate for stool softener - pt with bm this am which was painful as expected    # neutropenic fever- resolved  - In the ED VSS,  wbc 1.74, hb 9.8, plt 72, na 133, K 3.3 and repleted  - viral panel negative   - UA negative   - CXR negative  - CT a/p without new acute finding   - pt on zosyn  - no indication for neupogen   - Today, WBC 1.92, Hb 8.2, plt 56k     possible dc if afebrile today from heme onc standpoint   will follow daily   to f/u with [facility] on dc

## 2025-04-29 NOTE — DISCHARGE NOTE PROVIDER - HOSPITAL COURSE
63 y/o F w/ PMH of DM2, hypothyroidism, OA, anal cancer (s/p chemo / RT), COPD. Presenting with fevers that started this afternoon, max temperature 101. Patient admitted for neutropenic fever. Patient was seen by oncology and infectious disease . She was started on broad spectrum antibiotics however infectious work up was unrevealing. Patient remained afebrile for over 24 hrs stable for discharge.

## 2025-04-29 NOTE — DISCHARGE NOTE NURSING/CASE MANAGEMENT/SOCIAL WORK - FINANCIAL ASSISTANCE
[facility] provides services at a reduced cost to those who are determined to be eligible through [facility]’s financial assistance program. Information regarding [facility]’s financial assistance program can be found by going to [email or web address] or by calling [phone].

## 2025-04-29 NOTE — PROVIDER CONTACT NOTE (OTHER) - BACKGROUND
65 y/o female with history of anal cancer s/o chemotherapy and radiation (completed 4/24) presented to the ED d/t
Hx anal ca, here with neutropenic fever. + EAEC stool

## 2025-04-29 NOTE — DISCHARGE NOTE PROVIDER - NSDCMRMEDTOKEN_GEN_ALL_CORE_FT
ALPRAZolam 0.25 mg oral tablet: 1 tab(s) orally every 8 hours as needed for anxiety MDD: 1 mg  levothyroxine 112 mcg (0.112 mg) oral tablet: 1 tab(s) orally once a day  loperamide 2 mg oral capsule: 1 cap(s) orally every 3 hours  losartan 25 mg oral tablet: 1 tab(s) orally once a day  metFORMIN:   oxycodone-acetaminophen 5 mg-325 mg oral tablet: 1 tab(s) orally every 4 hours as needed for Moderate Pain (4 - 6) MDD: 20  Trelegy Ellipta 200 mcg-62.5 mcg-25 mcg/inh inhalation powder: 1 inhaled once a day

## 2025-04-29 NOTE — DISCHARGE NOTE PROVIDER - ATTENDING DISCHARGE PHYSICAL EXAMINATION:
PHYSICAL EXAM:  Gen: NAD  HEENT:  pupils equal and reactive, EOMI, no oropharyngeal lesions, erythema, exudates, oral thrush  NECK:   supple, no carotid bruits, no palpable lymph nodes, no thyromegaly  CV:  +S1, +S2, regular, no murmurs or rubs  RESP:   lungs clear to auscultation bilaterally, no wheezing, rales, rhonchi, good air entry bilaterally  BREAST:  not examined  GI:  abdomen soft, non-tender, non-distended, normal BS, no bruits, no abdominal masses, no palpable masses  RECTAL:  not examined  :  not examined  MSK:   normal muscle tone, no atrophy, no rigidity, no contractions  EXT:  no clubbing, no cyanosis, no edema, no calf pain, swelling or erythema  VASCULAR:  pulses equal and symmetric in the upper and lower extremities  NEURO:  AAOX3, no focal neurological deficits, follows all commands, able to move extremities spontaneously  SKIN:  no ulcers, lesions or rashes

## 2025-05-03 DIAGNOSIS — E87.1 HYPO-OSMOLALITY AND HYPONATREMIA: ICD-10-CM

## 2025-05-03 DIAGNOSIS — Z79.890 HORMONE REPLACEMENT THERAPY: ICD-10-CM

## 2025-05-03 DIAGNOSIS — D61.810 ANTINEOPLASTIC CHEMOTHERAPY INDUCED PANCYTOPENIA: ICD-10-CM

## 2025-05-03 DIAGNOSIS — Z79.51 LONG TERM (CURRENT) USE OF INHALED STEROIDS: ICD-10-CM

## 2025-05-03 DIAGNOSIS — R65.10 SYSTEMIC INFLAMMATORY RESPONSE SYNDROME (SIRS) OF NON-INFECTIOUS ORIGIN WITHOUT ACUTE ORGAN DYSFUNCTION: ICD-10-CM

## 2025-05-03 DIAGNOSIS — A04.0 ENTEROPATHOGENIC ESCHERICHIA COLI INFECTION: ICD-10-CM

## 2025-05-03 DIAGNOSIS — E44.0 MODERATE PROTEIN-CALORIE MALNUTRITION: ICD-10-CM

## 2025-05-03 DIAGNOSIS — I95.9 HYPOTENSION, UNSPECIFIED: ICD-10-CM

## 2025-05-03 DIAGNOSIS — I10 ESSENTIAL (PRIMARY) HYPERTENSION: ICD-10-CM

## 2025-05-03 DIAGNOSIS — E87.6 HYPOKALEMIA: ICD-10-CM

## 2025-05-03 DIAGNOSIS — E11.9 TYPE 2 DIABETES MELLITUS WITHOUT COMPLICATIONS: ICD-10-CM

## 2025-05-03 DIAGNOSIS — E86.0 DEHYDRATION: ICD-10-CM

## 2025-05-03 DIAGNOSIS — E03.9 HYPOTHYROIDISM, UNSPECIFIED: ICD-10-CM

## 2025-05-03 DIAGNOSIS — R50.81 FEVER PRESENTING WITH CONDITIONS CLASSIFIED ELSEWHERE: ICD-10-CM

## 2025-05-03 DIAGNOSIS — C21.0 MALIGNANT NEOPLASM OF ANUS, UNSPECIFIED: ICD-10-CM

## 2025-05-03 DIAGNOSIS — Z96.641 PRESENCE OF RIGHT ARTIFICIAL HIP JOINT: ICD-10-CM

## 2025-05-03 DIAGNOSIS — T45.1X5A ADVERSE EFFECT OF ANTINEOPLASTIC AND IMMUNOSUPPRESSIVE DRUGS, INITIAL ENCOUNTER: ICD-10-CM

## 2025-05-03 DIAGNOSIS — J44.9 CHRONIC OBSTRUCTIVE PULMONARY DISEASE, UNSPECIFIED: ICD-10-CM

## 2025-05-03 DIAGNOSIS — Z79.84 LONG TERM (CURRENT) USE OF ORAL HYPOGLYCEMIC DRUGS: ICD-10-CM

## 2025-05-19 ENCOUNTER — NON-APPOINTMENT (OUTPATIENT)
Age: 65
End: 2025-05-19

## 2025-05-19 ENCOUNTER — RESULT CHARGE (OUTPATIENT)
Age: 65
End: 2025-05-19

## 2025-05-19 ENCOUNTER — APPOINTMENT (OUTPATIENT)
Dept: INTERNAL MEDICINE | Facility: CLINIC | Age: 65
End: 2025-05-19
Payer: COMMERCIAL

## 2025-05-19 VITALS
BODY MASS INDEX: 19.29 KG/M2 | HEIGHT: 66 IN | RESPIRATION RATE: 15 BRPM | SYSTOLIC BLOOD PRESSURE: 110 MMHG | OXYGEN SATURATION: 95 % | DIASTOLIC BLOOD PRESSURE: 70 MMHG | WEIGHT: 120 LBS | HEART RATE: 65 BPM | TEMPERATURE: 97.8 F

## 2025-05-19 DIAGNOSIS — J18.9 PNEUMONIA, UNSPECIFIED ORGANISM: ICD-10-CM

## 2025-05-19 DIAGNOSIS — Z87.891 PERSONAL HISTORY OF NICOTINE DEPENDENCE: ICD-10-CM

## 2025-05-19 DIAGNOSIS — J44.9 CHRONIC OBSTRUCTIVE PULMONARY DISEASE, UNSPECIFIED: ICD-10-CM

## 2025-05-19 DIAGNOSIS — J98.11 ATELECTASIS: ICD-10-CM

## 2025-05-19 DIAGNOSIS — R91.8 OTHER NONSPECIFIC ABNORMAL FINDING OF LUNG FIELD: ICD-10-CM

## 2025-05-19 DIAGNOSIS — J90 PLEURAL EFFUSION, NOT ELSEWHERE CLASSIFIED: ICD-10-CM

## 2025-05-19 DIAGNOSIS — R06.09 OTHER FORMS OF DYSPNEA: ICD-10-CM

## 2025-05-19 DIAGNOSIS — C20 MALIGNANT NEOPLASM OF RECTUM: ICD-10-CM

## 2025-05-19 PROCEDURE — 94060 EVALUATION OF WHEEZING: CPT

## 2025-05-19 PROCEDURE — 99214 OFFICE O/P EST MOD 30 MIN: CPT | Mod: 25

## 2025-06-10 ENCOUNTER — APPOINTMENT (OUTPATIENT)
Dept: GASTROENTEROLOGY | Facility: CLINIC | Age: 65
End: 2025-06-10
Payer: COMMERCIAL

## 2025-06-10 VITALS
BODY MASS INDEX: 19.44 KG/M2 | DIASTOLIC BLOOD PRESSURE: 80 MMHG | WEIGHT: 121 LBS | HEIGHT: 66 IN | SYSTOLIC BLOOD PRESSURE: 126 MMHG

## 2025-06-10 PROBLEM — K52.9 COLITIS: Status: ACTIVE | Noted: 2025-06-10

## 2025-06-10 PROBLEM — C21.0 SQUAMOUS CELL CANCER, ANUS: Status: ACTIVE | Noted: 2025-06-10

## 2025-06-10 PROCEDURE — 99204 OFFICE O/P NEW MOD 45 MIN: CPT

## 2025-06-16 ENCOUNTER — OUTPATIENT (OUTPATIENT)
Dept: OUTPATIENT SERVICES | Facility: HOSPITAL | Age: 65
LOS: 1 days | End: 2025-06-16
Payer: COMMERCIAL

## 2025-06-16 ENCOUNTER — APPOINTMENT (OUTPATIENT)
Dept: CT IMAGING | Facility: CLINIC | Age: 65
End: 2025-06-16
Payer: COMMERCIAL

## 2025-06-16 ENCOUNTER — TRANSCRIPTION ENCOUNTER (OUTPATIENT)
Age: 65
End: 2025-06-16

## 2025-06-16 DIAGNOSIS — Z00.8 ENCOUNTER FOR OTHER GENERAL EXAMINATION: ICD-10-CM

## 2025-06-16 DIAGNOSIS — Z96.641 PRESENCE OF RIGHT ARTIFICIAL HIP JOINT: Chronic | ICD-10-CM

## 2025-06-16 DIAGNOSIS — J18.9 PNEUMONIA, UNSPECIFIED ORGANISM: ICD-10-CM

## 2025-06-16 PROBLEM — C20: Status: ACTIVE | Noted: 2025-06-16

## 2025-06-16 PROBLEM — A09 INFECTIOUS COLITIS: Status: ACTIVE | Noted: 2025-06-16

## 2025-06-16 PROCEDURE — 71250 CT THORAX DX C-: CPT

## 2025-06-16 PROCEDURE — 71250 CT THORAX DX C-: CPT | Mod: 26

## 2025-06-20 ENCOUNTER — NON-APPOINTMENT (OUTPATIENT)
Age: 65
End: 2025-06-20

## 2025-09-16 ENCOUNTER — APPOINTMENT (OUTPATIENT)
Dept: INTERNAL MEDICINE | Facility: CLINIC | Age: 65
End: 2025-09-16
Payer: COMMERCIAL

## 2025-09-16 VITALS
HEART RATE: 66 BPM | HEIGHT: 65.5 IN | WEIGHT: 122 LBS | DIASTOLIC BLOOD PRESSURE: 86 MMHG | TEMPERATURE: 98 F | BODY MASS INDEX: 20.08 KG/M2 | OXYGEN SATURATION: 99 % | RESPIRATION RATE: 18 BRPM | SYSTOLIC BLOOD PRESSURE: 151 MMHG

## 2025-09-16 DIAGNOSIS — R91.8 OTHER NONSPECIFIC ABNORMAL FINDING OF LUNG FIELD: ICD-10-CM

## 2025-09-16 DIAGNOSIS — R06.09 OTHER FORMS OF DYSPNEA: ICD-10-CM

## 2025-09-16 DIAGNOSIS — J44.9 CHRONIC OBSTRUCTIVE PULMONARY DISEASE, UNSPECIFIED: ICD-10-CM

## 2025-09-16 DIAGNOSIS — J44.89 OTHER SPECIFIED CHRONIC OBSTRUCTIVE PULMONARY DISEASE: ICD-10-CM

## 2025-09-16 DIAGNOSIS — Z87.891 PERSONAL HISTORY OF NICOTINE DEPENDENCE: ICD-10-CM

## 2025-09-16 PROCEDURE — 99215 OFFICE O/P EST HI 40 MIN: CPT | Mod: 25

## 2025-09-16 PROCEDURE — 94727 GAS DIL/WSHOT DETER LNG VOL: CPT

## 2025-09-16 PROCEDURE — 94729 DIFFUSING CAPACITY: CPT

## 2025-09-16 PROCEDURE — 94060 EVALUATION OF WHEEZING: CPT
